# Patient Record
Sex: MALE | Race: WHITE | NOT HISPANIC OR LATINO | Employment: UNEMPLOYED | URBAN - METROPOLITAN AREA
[De-identification: names, ages, dates, MRNs, and addresses within clinical notes are randomized per-mention and may not be internally consistent; named-entity substitution may affect disease eponyms.]

---

## 2017-12-12 ENCOUNTER — TELEPHONE (OUTPATIENT)
Dept: INTERNAL MEDICINE | Facility: CLINIC | Age: 52
End: 2017-12-12

## 2017-12-12 NOTE — TELEPHONE ENCOUNTER
----- Message from Velma Manuelite sent at 12/12/2017  1:40 PM CST -----  Contact: Pt   Pt called and stated he needed to speak to the nurse. He stated he cannot make the appt for 2 and needs to reschedule for today or a date as son as possible. He can be reached at [phone] (home).    Thanks,  TF

## 2018-02-15 ENCOUNTER — PATIENT OUTREACH (OUTPATIENT)
Dept: ADMINISTRATIVE | Facility: HOSPITAL | Age: 53
End: 2018-02-15

## 2018-03-01 ENCOUNTER — OFFICE VISIT (OUTPATIENT)
Dept: INTERNAL MEDICINE | Facility: CLINIC | Age: 53
End: 2018-03-01
Payer: MEDICAID

## 2018-03-01 ENCOUNTER — LAB VISIT (OUTPATIENT)
Dept: LAB | Facility: HOSPITAL | Age: 53
End: 2018-03-01
Attending: FAMILY MEDICINE
Payer: MEDICAID

## 2018-03-01 VITALS
RESPIRATION RATE: 16 BRPM | OXYGEN SATURATION: 98 % | DIASTOLIC BLOOD PRESSURE: 74 MMHG | SYSTOLIC BLOOD PRESSURE: 118 MMHG | WEIGHT: 158.31 LBS | HEIGHT: 68 IN | HEART RATE: 111 BPM | TEMPERATURE: 97 F | BODY MASS INDEX: 23.99 KG/M2

## 2018-03-01 DIAGNOSIS — F11.20 NARCOTIC DEPENDENCE: ICD-10-CM

## 2018-03-01 DIAGNOSIS — M96.1 POST LAMINECTOMY SYNDROME: ICD-10-CM

## 2018-03-01 DIAGNOSIS — Z00.00 ROUTINE GENERAL MEDICAL EXAMINATION AT A HEALTH CARE FACILITY: ICD-10-CM

## 2018-03-01 DIAGNOSIS — Z00.00 ROUTINE GENERAL MEDICAL EXAMINATION AT A HEALTH CARE FACILITY: Primary | ICD-10-CM

## 2018-03-01 DIAGNOSIS — Z12.11 ENCOUNTER FOR SCREENING COLONOSCOPY: ICD-10-CM

## 2018-03-01 LAB
ALBUMIN SERPL BCP-MCNC: 4 G/DL
ALP SERPL-CCNC: 68 U/L
ALT SERPL W/O P-5'-P-CCNC: 20 U/L
ANION GAP SERPL CALC-SCNC: 8 MMOL/L
AST SERPL-CCNC: 20 U/L
BASOPHILS # BLD AUTO: 0.03 K/UL
BASOPHILS NFR BLD: 0.5 %
BILIRUB SERPL-MCNC: 0.7 MG/DL
BUN SERPL-MCNC: 11 MG/DL
CALCIUM SERPL-MCNC: 9.4 MG/DL
CHLORIDE SERPL-SCNC: 105 MMOL/L
CO2 SERPL-SCNC: 26 MMOL/L
CREAT SERPL-MCNC: 0.9 MG/DL
DIFFERENTIAL METHOD: ABNORMAL
EOSINOPHIL # BLD AUTO: 0.4 K/UL
EOSINOPHIL NFR BLD: 7.1 %
ERYTHROCYTE [DISTWIDTH] IN BLOOD BY AUTOMATED COUNT: 12.4 %
EST. GFR  (AFRICAN AMERICAN): >60 ML/MIN/1.73 M^2
EST. GFR  (NON AFRICAN AMERICAN): >60 ML/MIN/1.73 M^2
GLUCOSE SERPL-MCNC: 100 MG/DL
HCT VFR BLD AUTO: 38.9 %
HGB BLD-MCNC: 13.5 G/DL
LYMPHOCYTES # BLD AUTO: 1.2 K/UL
LYMPHOCYTES NFR BLD: 20.9 %
MCH RBC QN AUTO: 34.9 PG
MCHC RBC AUTO-ENTMCNC: 34.7 G/DL
MCV RBC AUTO: 101 FL
MONOCYTES # BLD AUTO: 0.8 K/UL
MONOCYTES NFR BLD: 13.8 %
NEUTROPHILS # BLD AUTO: 3.4 K/UL
NEUTROPHILS NFR BLD: 57.4 %
PLATELET # BLD AUTO: 284 K/UL
PMV BLD AUTO: 9.4 FL
POTASSIUM SERPL-SCNC: 4.2 MMOL/L
PROT SERPL-MCNC: 7.5 G/DL
RBC # BLD AUTO: 3.87 M/UL
SODIUM SERPL-SCNC: 139 MMOL/L
WBC # BLD AUTO: 5.88 K/UL

## 2018-03-01 PROCEDURE — 80061 LIPID PANEL: CPT

## 2018-03-01 PROCEDURE — 86803 HEPATITIS C AB TEST: CPT

## 2018-03-01 PROCEDURE — 83036 HEMOGLOBIN GLYCOSYLATED A1C: CPT

## 2018-03-01 PROCEDURE — 36415 COLL VENOUS BLD VENIPUNCTURE: CPT | Mod: PO

## 2018-03-01 PROCEDURE — 99215 OFFICE O/P EST HI 40 MIN: CPT | Mod: PBBFAC,PO | Performed by: FAMILY MEDICINE

## 2018-03-01 PROCEDURE — 99396 PREV VISIT EST AGE 40-64: CPT | Mod: S$PBB,,, | Performed by: FAMILY MEDICINE

## 2018-03-01 PROCEDURE — 85025 COMPLETE CBC W/AUTO DIFF WBC: CPT | Mod: PO

## 2018-03-01 PROCEDURE — 80053 COMPREHEN METABOLIC PANEL: CPT | Mod: PO

## 2018-03-01 PROCEDURE — 99999 PR PBB SHADOW E&M-EST. PATIENT-LVL V: CPT | Mod: PBBFAC,,, | Performed by: FAMILY MEDICINE

## 2018-03-01 RX ORDER — TRAMADOL HYDROCHLORIDE 50 MG/1
50 TABLET ORAL EVERY 6 HOURS PRN
Qty: 30 TABLET | Refills: 0 | Status: SHIPPED | OUTPATIENT
Start: 2018-03-01 | End: 2018-03-11

## 2018-03-01 RX ORDER — OXYCODONE AND ACETAMINOPHEN 10; 325 MG/1; MG/1
1 TABLET ORAL
COMMUNITY
Start: 2017-01-06 | End: 2018-03-01 | Stop reason: ALTCHOICE

## 2018-03-01 RX ORDER — IBUPROFEN 200 MG
200 TABLET ORAL
COMMUNITY

## 2018-03-01 RX ORDER — INDOMETHACIN 25 MG/1
CAPSULE ORAL
COMMUNITY
Start: 2017-01-12

## 2018-03-01 RX ORDER — METHOCARBAMOL 500 MG/1
500 TABLET, FILM COATED ORAL
COMMUNITY

## 2018-03-01 NOTE — ASSESSMENT & PLAN NOTE
Pt requesting narcotics. I declined.  Offered to send pt to pain management, which he is agreeable to.  Pt on disability, has imaging, and dx to substantiate chronic pain mangement.  Will have pt see Dr. edwards and discuss interventional tx with injections versus oral meds.  I think that pt has a need for treatment, but needs further evaluation.  Will be happy to see pt here in [place] for chronic pain if his situation is agreeable to Dr. Edwards for oral medication management.

## 2018-03-02 ENCOUNTER — TELEPHONE (OUTPATIENT)
Dept: INTERNAL MEDICINE | Facility: CLINIC | Age: 53
End: 2018-03-02

## 2018-03-02 LAB
CHOLEST SERPL-MCNC: 209 MG/DL
CHOLEST/HDLC SERPL: 4.4 {RATIO}
ESTIMATED AVG GLUCOSE: 88 MG/DL
HBA1C MFR BLD HPLC: 4.7 %
HCV AB SERPL QL IA: NEGATIVE
HDLC SERPL-MCNC: 47 MG/DL
HDLC SERPL: 22.5 %
LDLC SERPL CALC-MCNC: 104.4 MG/DL
NONHDLC SERPL-MCNC: 162 MG/DL
TRIGL SERPL-MCNC: 288 MG/DL

## 2018-03-02 NOTE — TELEPHONE ENCOUNTER
Spoke with pt and informed him per  He can make a follow up appt to discuss. Normal process is likely an xray then MRI, but we they can discuss at fu visit. Pt verbalized understanding and scheduled for Monday 3/5/18 to see .

## 2018-03-02 NOTE — TELEPHONE ENCOUNTER
----- Message from Thea Will sent at 3/2/2018  2:24 PM CST -----  Contact: Patient   Patient like to have a MRI done, Please call him at [phone].    Thanks  td

## 2018-03-02 NOTE — TELEPHONE ENCOUNTER
Pt wants to know if he can have an MRI done before his 3mo fu with  bc of back pain. I explained to him he was gone for the day so it may not be until Monday before I can get in touch with him. Pt verbalized understanding.

## 2018-03-21 ENCOUNTER — DOCUMENTATION ONLY (OUTPATIENT)
Dept: SURGERY | Facility: CLINIC | Age: 53
End: 2018-03-21

## 2018-03-21 NOTE — PROGRESS NOTES
Called the number provided in regards to scheduling procedure/colonoscopy, no ans left msg via voicemail to call office.

## 2018-05-09 ENCOUNTER — TELEPHONE (OUTPATIENT)
Dept: INTERNAL MEDICINE | Facility: CLINIC | Age: 53
End: 2018-05-09

## 2018-05-09 ENCOUNTER — TELEPHONE (OUTPATIENT)
Dept: SURGERY | Facility: CLINIC | Age: 53
End: 2018-05-09

## 2018-05-09 NOTE — TELEPHONE ENCOUNTER
----- Message from Ledy Vora MA sent at 5/8/2018 10:58 AM CDT -----  Regarding: Colonoscopy  Patient is calling to schedule colonoscopy

## 2018-05-09 NOTE — TELEPHONE ENCOUNTER
----- Message from Kiara Stroud sent at 5/9/2018  4:22 PM CDT -----  returned call...[phone] (home)

## 2018-05-09 NOTE — TELEPHONE ENCOUNTER
----- Message from Jody Mitchell sent at 5/9/2018  2:50 PM CDT -----  Contact: self [phone]  Would like to have order for Mri of back, please call back at [phone]. Md Sherie

## 2018-05-09 NOTE — TELEPHONE ENCOUNTER
Returned call in regards to scheduling colonoscopy/Dr. Pedroza no ans left msg via vm to call office.

## 2018-05-09 NOTE — TELEPHONE ENCOUNTER
Pt states that at his last appt on 3/1/2018 and MRI was discussed but I don't see where one was ever ordered. Pt is wanting to get this done to see what is causing his back pain

## 2018-05-10 NOTE — TELEPHONE ENCOUNTER
Left msg for pt that I was returning his call regarding the MRI to call us back at [phone].     Need to let pt know per    Pt wishes to have MRI, but I have no specific indication as of yet. I explained this to him on the last visit.     He states that he has the records. I do not have them though.     He was offered a visit to pain management, which has not been scheduled.     He was then scheduled with Dr. Cerda, then said it was taking too long, so he then left.     I have no specific info to warrant an MRI at this juncture.     If we receive records from his previous pain physician of his diagnosis of post laminectomy syndrome, I would be happy to help him.     I think we will have to discuss his problem in office and evaluate his issues though.     There is no MRI ordered currently.

## 2018-05-14 ENCOUNTER — TELEPHONE (OUTPATIENT)
Dept: INTERNAL MEDICINE | Facility: CLINIC | Age: 53
End: 2018-05-14

## 2018-05-14 NOTE — TELEPHONE ENCOUNTER
Spoke with pt and informed him per      Pt wishes to have MRI, but I have no specific indication as of yet. I explained this to him on the last visit.     He states that he has the records. I do not have them though.     He was offered a visit to pain management, which has not been scheduled.     He was then scheduled with Dr. Cerda, then said it was taking too long, so he then left.     I have no specific info to warrant an MRI at this juncture.     If we receive records from his previous pain physician of his diagnosis of post laminectomy syndrome, I would be happy to help him.     I think we will have to discuss his problem in office and evaluate his issues though.     There is no MRI ordered currently.     Pt's said that he is going to go back to see his regular doctor that he was seeing. He asked when we could get him in though and the next available was 9/20/18. Pt said he will go see his other doctor since our appts are months out.

## 2018-05-14 NOTE — TELEPHONE ENCOUNTER
----- Message from Román Samson sent at 5/14/2018  7:22 AM CDT -----  Contact: pt  He's calling in regards to orders to have an MRI scheduled, pls advise, [phone] (home)

## 2018-05-21 ENCOUNTER — PATIENT OUTREACH (OUTPATIENT)
Dept: ADMINISTRATIVE | Facility: HOSPITAL | Age: 53
End: 2018-05-21

## 2018-06-04 ENCOUNTER — TELEPHONE (OUTPATIENT)
Dept: INTERNAL MEDICINE | Facility: CLINIC | Age: 53
End: 2018-06-04

## 2018-06-04 PROBLEM — Z00.00 ROUTINE GENERAL MEDICAL EXAMINATION AT A HEALTH CARE FACILITY: Status: RESOLVED | Noted: 2018-03-01 | Resolved: 2018-06-04

## 2018-06-04 NOTE — TELEPHONE ENCOUNTER
Tried calling pt twice this morning and the number did not work. Tried calling pts sister with no answer.     Tried a 3rd time and was able to leave a msg for the pt to call us to reschedule his appt today with  at 1 bc he will be out of the office to call at [phone]

## 2018-06-12 ENCOUNTER — DOCUMENTATION ONLY (OUTPATIENT)
Dept: ENDOSCOPY | Facility: HOSPITAL | Age: 53
End: 2018-06-12

## 2018-06-12 NOTE — PROGRESS NOTES
Pt canceled procedure due to lack of transportation. He stated he will call back once transportation arrangements have been set up.

## 2023-08-06 NOTE — PROGRESS NOTES
Subjective:       Patient ID: Octavio Pang is a 52 y.o. male.    Chief Complaint: Back Pain and Neck Pain    Subjective:     Octavio Pang is a 52 y.o. male and is here for a comprehensive physical exam. The patient reports problems - back pain, neck pain.    Do you take any herbs or supplements that were not prescribed by a doctor? Yes, vitamins  Are you taking calcium supplements? yes  Are you taking aspirin daily? no     History:  Any STD's in the past? none    The following portions of the patient's history were reviewed and updated as appropriate: allergies, current medications, past family history, past medical history, past social history, past surgical history and problem list.    Review of Systems  Do you have pain that bothers you in your daily life? Yes - back pain, and neck pain  Pertinent items are noted in HPI.    Problem List Items Addressed This Visit        GI    Encounter for screening colonoscopy    Relevant Orders    Case request GI: COLONOSCOPY (Completed)       Orthopedic    Post laminectomy syndrome    Relevant Medications    traMADol (ULTRAM) 50 mg tablet       Other    Routine general medical examination at a health care facility - Primary    Relevant Orders    Lipid panel    Hepatitis C antibody    CBC auto differential    Comprehensive metabolic panel    Hemoglobin A1c      2. Patient Counseling:  --Nutrition: Stressed importance of moderation in sodium/caffeine intake, saturated fat and cholesterol, caloric balance, sufficient intake of fresh fruits, vegetables, fiber, calcium, iron, and 1 mg of folate supplement per day (for females capable of pregnancy).  --Discussed the issue of estrogen replacement, calcium supplement, and the daily use of baby aspirin.  --Exercise: Stressed the importance of regular exercise.   --Substance Abuse: Discussed cessation/primary prevention of tobacco, alcohol, or other drug use; driving or other dangerous activities under the influence; availability of  treatment for abuse.    --Sexuality: Discussed sexually transmitted diseases, partner selection, use of condoms, avoidance of unintended pregnancy  and contraceptive alternatives.   --Injury prevention: Discussed safety belts, safety helmets, smoke detector, smoking near bedding or upholstery.   --Dental health: Discussed importance of regular tooth brushing, flossing, and dental visits.  --Immunizations reviewed.  --Discussed benefits of screening colonoscopy.  --After hours service discussed with patient    3. Discussed the patient's BMI with him.  The BMI WNL.  4. Follow up as needed for acute illness          Neck Pain    Pertinent negatives include no chest pain or headaches.     Review of Systems   Constitutional: Negative for activity change.   HENT: Negative for ear pain.    Eyes: Negative for pain.   Respiratory: Negative for shortness of breath.    Cardiovascular: Negative for chest pain.   Gastrointestinal: Negative for abdominal pain.   Genitourinary: Negative for dysuria.   Musculoskeletal: Positive for back pain and neck pain.   Skin: Negative for rash.   Neurological: Negative for headaches.       Objective:      Physical Exam   Constitutional: He appears well-developed and well-nourished. No distress.   HENT:   Head: Normocephalic and atraumatic.   Cardiovascular: Normal rate and regular rhythm.    Pulmonary/Chest: Effort normal and breath sounds normal. No respiratory distress. He has no wheezes.   Abdominal: Soft. Bowel sounds are normal. There is no tenderness.   Musculoskeletal: He exhibits no edema.   Neurological: He is alert.   Skin: Skin is warm and dry. No rash noted. He is not diaphoretic. No erythema.   Nursing note and vitals reviewed.      Assessment:       1. Routine general medical examination at a health care facility    2. Encounter for screening colonoscopy    3. Post laminectomy syndrome    4. Narcotic dependence        Plan:     Problem List Items Addressed This Visit         Psychiatric    Narcotic dependence    Current Assessment & Plan     See post-laminectomy note.            GI    Encounter for screening colonoscopy    Relevant Orders    Case request GI: COLONOSCOPY (Completed)       Orthopedic    Post laminectomy syndrome    Current Assessment & Plan     Pt requesting narcotics. I declined.  Offered to send pt to pain management, which he is agreeable to.  Pt on disability, has imaging, and dx to substantiate chronic pain mangement.  Will have pt see Dr. edwards and discuss interventional tx with injections versus oral meds.  I think that pt has a need for treatment, but needs further evaluation.  Will be happy to see pt here in [place] for chronic pain if his situation is agreeable to Dr. Edwards for oral medication management.         Relevant Medications    traMADol (ULTRAM) 50 mg tablet       Other    Routine general medical examination at a health care facility - Primary    Relevant Orders    Lipid panel    Hepatitis C antibody    CBC auto differential    Comprehensive metabolic panel    Hemoglobin A1c         Supervision was available

## 2024-05-12 ENCOUNTER — HOSPITAL ENCOUNTER (EMERGENCY)
Facility: HOSPITAL | Age: 59
Discharge: HOME | End: 2024-05-12
Attending: EMERGENCY MEDICINE
Payer: MEDICARE

## 2024-05-12 VITALS
WEIGHT: 141.63 LBS | HEIGHT: 60 IN | SYSTOLIC BLOOD PRESSURE: 118 MMHG | OXYGEN SATURATION: 100 % | DIASTOLIC BLOOD PRESSURE: 71 MMHG | HEART RATE: 88 BPM | TEMPERATURE: 98 F | RESPIRATION RATE: 20 BRPM | BODY MASS INDEX: 27.8 KG/M2

## 2024-05-12 DIAGNOSIS — T78.40XA ALLERGIC REACTION, INITIAL ENCOUNTER: Primary | ICD-10-CM

## 2024-05-12 PROCEDURE — 99284 EMERGENCY DEPT VISIT MOD MDM: CPT | Mod: 25,ER

## 2024-05-12 PROCEDURE — 63600175 PHARM REV CODE 636 W HCPCS: Mod: ER | Performed by: EMERGENCY MEDICINE

## 2024-05-12 PROCEDURE — 94761 N-INVAS EAR/PLS OXIMETRY MLT: CPT | Mod: ER

## 2024-05-12 PROCEDURE — 96372 THER/PROPH/DIAG INJ SC/IM: CPT | Performed by: EMERGENCY MEDICINE

## 2024-05-12 RX ORDER — DIPHENHYDRAMINE HYDROCHLORIDE 50 MG/ML
25 INJECTION INTRAMUSCULAR; INTRAVENOUS
Status: COMPLETED | OUTPATIENT
Start: 2024-05-12 | End: 2024-05-12

## 2024-05-12 RX ORDER — DEXAMETHASONE SODIUM PHOSPHATE 4 MG/ML
8 INJECTION, SOLUTION INTRA-ARTICULAR; INTRALESIONAL; INTRAMUSCULAR; INTRAVENOUS; SOFT TISSUE
Status: COMPLETED | OUTPATIENT
Start: 2024-05-12 | End: 2024-05-12

## 2024-05-12 RX ORDER — METHYLPREDNISOLONE 4 MG/1
TABLET ORAL
Qty: 1 EACH | Refills: 0 | Status: SHIPPED | OUTPATIENT
Start: 2024-05-12

## 2024-05-12 RX ADMIN — DEXAMETHASONE SODIUM PHOSPHATE 8 MG: 4 INJECTION INTRA-ARTICULAR; INTRALESIONAL; INTRAMUSCULAR; INTRAVENOUS; SOFT TISSUE at 02:05

## 2024-05-12 RX ADMIN — DIPHENHYDRAMINE HYDROCHLORIDE 25 MG: 50 INJECTION INTRAMUSCULAR; INTRAVENOUS at 02:05

## 2024-05-12 NOTE — DISCHARGE INSTRUCTIONS
No definite cause known for this reaction.    Return at any point if worse.      Use Benadryl 25 mg every 6 hours as needed.    Take the steroid Dosepak as labeled.

## 2024-05-12 NOTE — ED PROVIDER NOTES
Encounter Date: 5/12/2024       History     Chief Complaint   Patient presents with    Facial Swelling     Lower facial swelling, onset today, deny known allergies      He has spending some time outdoors, believes he may have been stone on the lower lip on the right side but is not certain about this.  He woke this morning with a feeling possibly consistent with an insect bite or sting on the right lower lip, he developed swelling involving the lower face and mid face bilaterally, slightly improved with Benadryl prior to arrival, no other complaints.  It does not itch or hurt and he does not have any intraoral symptoms of any kind.  No prior such reaction.  He has no known medication or food allergies.  He does smoke cigarettes but denies other significant substance use.  He has been on various medications in the past but is prescriptions now and states he only occasionally takes a BC powder or a similar over-the-counter analgesic.  No recent antibiotics.  Thorough questioning does not reveal other likely etiology for this reaction.    The history is provided by the patient. No  was used.     Review of patient's allergies indicates:   Allergen Reactions    Ancef [cefazolin] Other (See Comments)     Headaches and dizziness     Past Medical History:   Diagnosis Date    ADD (attention deficit disorder)     Anxiety     Back pain     Depression     Gout     Neuromuscular disorder      Past Surgical History:   Procedure Laterality Date    BACK SURGERY  10/13/2016    NECK SURGERY       Family History   Problem Relation Name Age of Onset    Arthritis Father      Pulmonary fibrosis Mother       Social History     Tobacco Use    Smoking status: Some Days     Current packs/day: 1.00     Types: Cigarettes    Smokeless tobacco: Never   Substance Use Topics    Alcohol use: Yes     Comment: occassionally     Drug use: No     Review of Systems   Constitutional:  Negative for chills and fever.   HENT:  Negative  for congestion, facial swelling, nosebleeds and sinus pressure.         See HPI   Eyes:  Negative for pain and redness.   Respiratory:  Negative for chest tightness, shortness of breath and wheezing.    Cardiovascular:  Negative for chest pain, palpitations and leg swelling.   Gastrointestinal:  Negative for abdominal distention, abdominal pain, diarrhea, nausea and vomiting.   Endocrine: Negative for cold intolerance, polydipsia and polyphagia.   Genitourinary:  Negative for difficulty urinating, dysuria, frequency and hematuria.   Musculoskeletal:  Negative for arthralgias, back pain, myalgias and neck pain.   Skin:  Negative for color change and rash.        See HPI   Neurological:  Negative for dizziness, weakness, numbness and headaches.   Hematological:  Negative for adenopathy. Does not bruise/bleed easily.   Psychiatric/Behavioral:  Negative for agitation and behavioral problems.    All other systems reviewed and are negative.      Physical Exam     Initial Vitals [05/12/24 1412]   BP Pulse Resp Temp SpO2   120/69 102 20 98.1 °F (36.7 °C) 97 %      MAP       --         Physical Exam    Nursing note and vitals reviewed.  Constitutional: He appears well-developed and well-nourished. He is not diaphoretic. No distress.   HENT:   Head: Normocephalic and atraumatic.   Mouth/Throat: Oropharynx is clear and moist. No oropharyngeal exudate.   Soft, painless swelling involves the skin of the lower 2/3 of the face bilaterally including the periorbital region but not the nose, slightly worse on the right, not associated with erythema, warmth, tenderness, fluctuance, or any evidence intraoral infection.  He does have poor teeth with severe erosions throughout but no gingival findings or other evidence of intraoral infection.  There is no intraoral swelling or edema, airway is clear, no stridor.  No other findings.  Exam is most suggestive of an angioedema type reaction.   Eyes: Conjunctivae and EOM are normal. Pupils  are equal, round, and reactive to light. Right eye exhibits no discharge. Left eye exhibits no discharge. No scleral icterus.   Neck: Neck supple. No thyromegaly present. No tracheal deviation present. No JVD present.   Normal range of motion.  Cardiovascular:  Normal rate, regular rhythm and normal heart sounds.     Exam reveals no gallop and no friction rub.       No murmur heard.  Pulmonary/Chest: Breath sounds normal. No respiratory distress. He has no wheezes. He has no rhonchi. He has no rales. He exhibits no tenderness.   Abdominal: Abdomen is soft. Bowel sounds are normal. He exhibits no distension and no mass. There is no abdominal tenderness. There is no rebound and no guarding.   Musculoskeletal:         General: No tenderness or edema. Normal range of motion.      Cervical back: Normal range of motion and neck supple.     Lymphadenopathy:     He has no cervical adenopathy.   Neurological: He is alert and oriented to person, place, and time. He has normal strength. No cranial nerve deficit.   Skin: Skin is warm and dry. No rash noted. No erythema.   Psychiatric: He has a normal mood and affect. His behavior is normal. Judgment and thought content normal.         ED Course   Procedures  Labs Reviewed - No data to display         3:34 PM No change in symptoms or exam.  Vital signs stable.  Continuing to monitor.    4:57 PM Multiple re-evaluations.  Mild improvement noted.  No worsening in any way, particular attention paid to neck, submental area, and all intraoral tissues.  While the quality of the facial and cheek swelling most closely resembles angioedema, can not rule out a histamine-mediated reaction.  Counseled in general regarding allergic reactions, stable for outpatient management with the following discharge instructions:      No definite cause known for this reaction.    Return at any point if worse.      Use Benadryl 25 mg every 6 hours as needed.    Take the steroid Dosepak as  labeled.          Imaging Results    None          Medications   dexAMETHasone injection 8 mg (8 mg Intramuscular Given 5/12/24 1448)   diphenhydrAMINE injection 25 mg (25 mg Intramuscular Given 5/12/24 1447)     Medical Decision Making  Problems Addressed:  Allergic reaction, initial encounter: acute illness or injury    Risk  Prescription drug management.      Additional MDM:   Differential Diagnosis:   Medication allergy, insect reaction, food allergy, other allergic reaction                                    Clinical Impression:  Final diagnoses:  [T78.40XA] Allergic reaction, initial encounter (Primary)          ED Disposition Condition    Discharge Stable          ED Prescriptions       Medication Sig Dispense Start Date End Date Auth. Provider    methylPREDNISolone (MEDROL DOSEPACK) 4 mg tablet As per package label 1 each 5/12/2024 -- Jermain Lema MD          Follow-up Information       Follow up With Specialties Details Why Contact [facility] - Emergency Dept Emergency Medicine  As needed [id number]  [place] [id number] [phone]    Herman Quan MD Internal Medicine  As needed [id number]  [place] [address]  [phone]               Jermain Lema MD  05/12/24 3287

## 2025-02-18 ENCOUNTER — HOSPITAL ENCOUNTER (OUTPATIENT)
Dept: RADIOLOGY | Facility: HOSPITAL | Age: 60
Discharge: HOME | End: 2025-02-18
Attending: INTERNAL MEDICINE
Payer: MEDICARE

## 2025-02-18 ENCOUNTER — HOSPITAL ENCOUNTER (INPATIENT)
Facility: HOSPITAL | Age: 60
LOS: 7 days | Discharge: HOME HEALTH | DRG: 164 | End: 2025-02-25
Attending: EMERGENCY MEDICINE | Admitting: SPECIALIST
Payer: MEDICARE

## 2025-02-18 ENCOUNTER — HOSPITAL ENCOUNTER (OUTPATIENT)
Dept: RADIOLOGY | Facility: HOSPITAL | Age: 60
Discharge: HOME | End: 2025-02-18
Attending: FAMILY MEDICINE
Payer: MEDICARE

## 2025-02-18 DIAGNOSIS — J93.9 BILATERAL PNEUMOTHORACES: ICD-10-CM

## 2025-02-18 DIAGNOSIS — M51.9 LUMBAR DISC DISORDER: ICD-10-CM

## 2025-02-18 DIAGNOSIS — F17.210 TOBACCO SMOKER, 1 PACK OF CIGARETTES OR LESS PER DAY: ICD-10-CM

## 2025-02-18 DIAGNOSIS — J41.8 MIXED SIMPLE AND MUCOPURULENT CHRONIC BRONCHITIS: ICD-10-CM

## 2025-02-18 DIAGNOSIS — M96.1 POST LAMINECTOMY SYNDROME: ICD-10-CM

## 2025-02-18 DIAGNOSIS — R06.02 SOB (SHORTNESS OF BREATH): ICD-10-CM

## 2025-02-18 DIAGNOSIS — Z96.89 CHEST TUBE IN PLACE: ICD-10-CM

## 2025-02-18 DIAGNOSIS — R05.9 COUGH, UNSPECIFIED TYPE: ICD-10-CM

## 2025-02-18 DIAGNOSIS — J93.83 SPONTANEOUS PNEUMOTHORAX: Primary | ICD-10-CM

## 2025-02-18 DIAGNOSIS — R93.89 ABNORMAL CT OF THE CHEST: ICD-10-CM

## 2025-02-18 PROBLEM — Z12.11 ENCOUNTER FOR SCREENING COLONOSCOPY: Status: RESOLVED | Noted: 2018-03-01 | Resolved: 2025-02-18

## 2025-02-18 PROBLEM — F41.9 ANXIETY: Status: ACTIVE | Noted: 2025-02-18

## 2025-02-18 LAB
ALBUMIN SERPL BCP-MCNC: 2.9 G/DL (ref 3.5–5.2)
ALP SERPL-CCNC: 61 U/L (ref 40–150)
ALT SERPL W/O P-5'-P-CCNC: 15 U/L (ref 10–44)
ANION GAP SERPL CALC-SCNC: 9 MMOL/L (ref 8–16)
AST SERPL-CCNC: 22 U/L (ref 10–40)
BASOPHILS # BLD AUTO: 0.07 K/UL (ref 0–0.2)
BASOPHILS NFR BLD: 0.6 % (ref 0–1.9)
BILIRUB SERPL-MCNC: 0.2 MG/DL (ref 0.1–1)
BNP SERPL-MCNC: <10 PG/ML (ref 0–99)
BUN SERPL-MCNC: 18 MG/DL (ref 6–20)
CALCIUM SERPL-MCNC: 9.2 MG/DL (ref 8.7–10.5)
CHLORIDE SERPL-SCNC: 99 MMOL/L (ref 95–110)
CO2 SERPL-SCNC: 28 MMOL/L (ref 23–29)
CREAT SERPL-MCNC: 0.8 MG/DL (ref 0.5–1.4)
CTP QC/QA: YES
CTP QC/QA: YES
DIFFERENTIAL METHOD BLD: ABNORMAL
EOSINOPHIL # BLD AUTO: 1.1 K/UL (ref 0–0.5)
EOSINOPHIL NFR BLD: 9.9 % (ref 0–8)
ERYTHROCYTE [DISTWIDTH] IN BLOOD BY AUTOMATED COUNT: 13.2 % (ref 11.5–14.5)
EST. GFR  (NO RACE VARIABLE): >60 ML/MIN/1.73 M^2
GLUCOSE SERPL-MCNC: 96 MG/DL (ref 70–110)
HCT VFR BLD AUTO: 34.7 % (ref 40–54)
HGB BLD-MCNC: 12.1 G/DL (ref 14–18)
IMM GRANULOCYTES # BLD AUTO: 0.04 K/UL (ref 0–0.04)
IMM GRANULOCYTES NFR BLD AUTO: 0.4 % (ref 0–0.5)
LACTATE SERPL-SCNC: 1.8 MMOL/L (ref 0.5–2.2)
LYMPHOCYTES # BLD AUTO: 1.1 K/UL (ref 1–4.8)
LYMPHOCYTES NFR BLD: 9.9 % (ref 18–48)
MCH RBC QN AUTO: 31.1 PG (ref 27–31)
MCHC RBC AUTO-ENTMCNC: 34.9 G/DL (ref 32–36)
MCV RBC AUTO: 89 FL (ref 82–98)
MONOCYTES # BLD AUTO: 1 K/UL (ref 0.3–1)
MONOCYTES NFR BLD: 9 % (ref 4–15)
NEUTROPHILS # BLD AUTO: 7.6 K/UL (ref 1.8–7.7)
NEUTROPHILS NFR BLD: 70.2 % (ref 38–73)
NRBC BLD-RTO: 0 /100 WBC
PLATELET # BLD AUTO: 476 K/UL (ref 150–450)
PMV BLD AUTO: 9.1 FL (ref 9.2–12.9)
POC MOLECULAR INFLUENZA A AGN: NEGATIVE
POC MOLECULAR INFLUENZA B AGN: NEGATIVE
POTASSIUM SERPL-SCNC: 4.2 MMOL/L (ref 3.5–5.1)
PROT SERPL-MCNC: 8.5 G/DL (ref 6–8.4)
RBC # BLD AUTO: 3.89 M/UL (ref 4.6–6.2)
SARS-COV-2 RDRP RESP QL NAA+PROBE: NEGATIVE
SODIUM SERPL-SCNC: 136 MMOL/L (ref 136–145)
TROPONIN I SERPL DL<=0.01 NG/ML-MCNC: <0.006 NG/ML (ref 0–0.03)
WBC # BLD AUTO: 10.83 K/UL (ref 3.9–12.7)

## 2025-02-18 PROCEDURE — 25500020 PHARM REV CODE 255: Mod: ER | Performed by: SPECIALIST

## 2025-02-18 PROCEDURE — 83605 ASSAY OF LACTIC ACID: CPT | Mod: ER | Performed by: EMERGENCY MEDICINE

## 2025-02-18 PROCEDURE — 87040 BLOOD CULTURE FOR BACTERIA: CPT | Performed by: EMERGENCY MEDICINE

## 2025-02-18 PROCEDURE — 71046 X-RAY EXAM CHEST 2 VIEWS: CPT | Mod: TC,PO

## 2025-02-18 PROCEDURE — 80053 COMPREHEN METABOLIC PANEL: CPT | Mod: ER | Performed by: EMERGENCY MEDICINE

## 2025-02-18 PROCEDURE — 0W9930Z DRAINAGE OF RIGHT PLEURAL CAVITY WITH DRAINAGE DEVICE, PERCUTANEOUS APPROACH: ICD-10-PCS | Performed by: EMERGENCY MEDICINE

## 2025-02-18 PROCEDURE — 20000000 HC ICU ROOM

## 2025-02-18 PROCEDURE — 63600175 PHARM REV CODE 636 W HCPCS: Performed by: NURSE PRACTITIONER

## 2025-02-18 PROCEDURE — 27000221 HC OXYGEN, UP TO 24 HOURS: Mod: ER

## 2025-02-18 PROCEDURE — 83880 ASSAY OF NATRIURETIC PEPTIDE: CPT | Mod: ER | Performed by: EMERGENCY MEDICINE

## 2025-02-18 PROCEDURE — 25000003 PHARM REV CODE 250: Performed by: NURSE PRACTITIONER

## 2025-02-18 PROCEDURE — 87635 SARS-COV-2 COVID-19 AMP PRB: CPT | Mod: ER | Performed by: EMERGENCY MEDICINE

## 2025-02-18 PROCEDURE — 85025 COMPLETE CBC W/AUTO DIFF WBC: CPT | Mod: ER | Performed by: EMERGENCY MEDICINE

## 2025-02-18 PROCEDURE — 84484 ASSAY OF TROPONIN QUANT: CPT | Mod: ER | Performed by: EMERGENCY MEDICINE

## 2025-02-18 PROCEDURE — 63600175 PHARM REV CODE 636 W HCPCS: Mod: ER | Performed by: EMERGENCY MEDICINE

## 2025-02-18 PROCEDURE — 94761 N-INVAS EAR/PLS OXIMETRY MLT: CPT | Mod: ER

## 2025-02-18 RX ORDER — FAMOTIDINE 20 MG/1
20 TABLET, FILM COATED ORAL 2 TIMES DAILY
Status: DISCONTINUED | OUTPATIENT
Start: 2025-02-18 | End: 2025-02-21

## 2025-02-18 RX ORDER — ALPRAZOLAM 0.25 MG/1
0.25 TABLET ORAL NIGHTLY PRN
Status: DISCONTINUED | OUTPATIENT
Start: 2025-02-18 | End: 2025-02-19

## 2025-02-18 RX ORDER — MORPHINE SULFATE 4 MG/ML
4 INJECTION, SOLUTION INTRAMUSCULAR; INTRAVENOUS
Refills: 0 | Status: COMPLETED | OUTPATIENT
Start: 2025-02-18 | End: 2025-02-18

## 2025-02-18 RX ORDER — IPRATROPIUM BROMIDE AND ALBUTEROL SULFATE 2.5; .5 MG/3ML; MG/3ML
3 SOLUTION RESPIRATORY (INHALATION) EVERY 4 HOURS PRN
Status: DISCONTINUED | OUTPATIENT
Start: 2025-02-18 | End: 2025-02-25 | Stop reason: HOSPADM

## 2025-02-18 RX ORDER — LEVOFLOXACIN 5 MG/ML
750 INJECTION, SOLUTION INTRAVENOUS
Status: COMPLETED | OUTPATIENT
Start: 2025-02-18 | End: 2025-02-18

## 2025-02-18 RX ORDER — SODIUM CHLORIDE 0.9 % (FLUSH) 0.9 %
10 SYRINGE (ML) INJECTION
Status: DISCONTINUED | OUTPATIENT
Start: 2025-02-18 | End: 2025-02-24

## 2025-02-18 RX ORDER — ONDANSETRON HYDROCHLORIDE 2 MG/ML
8 INJECTION, SOLUTION INTRAVENOUS EVERY 8 HOURS PRN
Status: DISCONTINUED | OUTPATIENT
Start: 2025-02-18 | End: 2025-02-21

## 2025-02-18 RX ORDER — ACETAMINOPHEN 325 MG/1
650 TABLET ORAL EVERY 4 HOURS PRN
Status: DISCONTINUED | OUTPATIENT
Start: 2025-02-18 | End: 2025-02-25 | Stop reason: HOSPADM

## 2025-02-18 RX ORDER — HYDROCODONE BITARTRATE AND ACETAMINOPHEN 5; 325 MG/1; MG/1
1 TABLET ORAL EVERY 4 HOURS PRN
Refills: 0 | Status: DISCONTINUED | OUTPATIENT
Start: 2025-02-18 | End: 2025-02-20

## 2025-02-18 RX ORDER — LORAZEPAM 2 MG/ML
1 INJECTION INTRAMUSCULAR
Status: COMPLETED | OUTPATIENT
Start: 2025-02-18 | End: 2025-02-18

## 2025-02-18 RX ORDER — ENOXAPARIN SODIUM 100 MG/ML
40 INJECTION SUBCUTANEOUS EVERY 24 HOURS
Status: DISCONTINUED | OUTPATIENT
Start: 2025-02-18 | End: 2025-02-25 | Stop reason: HOSPADM

## 2025-02-18 RX ADMIN — ENOXAPARIN SODIUM 40 MG: 40 INJECTION SUBCUTANEOUS at 10:02

## 2025-02-18 RX ADMIN — HYDROCODONE BITARTRATE AND ACETAMINOPHEN 1 TABLET: 5; 325 TABLET ORAL at 10:02

## 2025-02-18 RX ADMIN — LORAZEPAM 1 MG: 2 INJECTION INTRAMUSCULAR; INTRAVENOUS at 04:02

## 2025-02-18 RX ADMIN — FAMOTIDINE 20 MG: 20 TABLET ORAL at 10:02

## 2025-02-18 RX ADMIN — ALPRAZOLAM 0.25 MG: 0.25 TABLET ORAL at 10:02

## 2025-02-18 RX ADMIN — LEVOFLOXACIN 750 MG: 750 INJECTION, SOLUTION INTRAVENOUS at 06:02

## 2025-02-18 RX ADMIN — IOHEXOL 100 ML: 350 INJECTION, SOLUTION INTRAVENOUS at 07:02

## 2025-02-18 RX ADMIN — MORPHINE SULFATE 4 MG: 4 INJECTION INTRAVENOUS at 04:02

## 2025-02-18 NOTE — ED PROVIDER NOTES
Encounter Date: 2/18/2025       History     Chief Complaint   Patient presents with    pneumothorax     X ray at pcp today. SOB x 2 months.      58 y/o M with long tobacco history here with c/o SOB, cough, sputum production over the past 4 weeks. XR today showed b/l PTX. Right is moderate to large, and left is small. Sent to ED for management. Has been using inhalers without improvement in symptoms.     The history is provided by the patient.     Review of patient's allergies indicates:   Allergen Reactions    Ancef [cefazolin] Other (See Comments)     Headaches and dizziness     Past Medical History:   Diagnosis Date    ADD (attention deficit disorder)     Anxiety     Back pain     Depression     Gout     Neuromuscular disorder      Past Surgical History:   Procedure Laterality Date    BACK SURGERY  10/13/2016    NECK SURGERY       Family History   Problem Relation Name Age of Onset    Arthritis Father      Pulmonary fibrosis Mother       Social History[1]  Review of Systems   Constitutional:  Negative for chills and fever.   HENT:  Positive for congestion. Negative for dental problem.    Eyes:  Negative for pain and visual disturbance.   Respiratory:  Positive for cough and shortness of breath.    Cardiovascular:  Negative for chest pain and palpitations.   Gastrointestinal:  Negative for abdominal pain, diarrhea, nausea and vomiting.   Genitourinary:  Negative for dysuria and flank pain.   Musculoskeletal:  Negative for back pain and neck pain.   Skin:  Negative for rash and wound.   Neurological:  Negative for weakness, numbness and headaches.       Physical Exam     Initial Vitals   BP Pulse Resp Temp SpO2   02/18/25 1610 02/18/25 1610 02/18/25 1610 02/18/25 1613 02/18/25 1610   136/78 (!) 111 20 98 °F (36.7 °C) 97 %      MAP       --                Physical Exam    Constitutional: He appears well-developed and well-nourished. No distress.   HENT:   Head: Normocephalic and atraumatic. Mouth/Throat: Oropharynx  is clear and moist.   Eyes: EOM are normal. Pupils are equal, round, and reactive to light.   Neck: Neck supple. No tracheal deviation present.   Normal range of motion.  Cardiovascular:  Normal rate and regular rhythm.           Pulmonary/Chest: No respiratory distress.   Diminished breath sounds on the right.    Abdominal: He exhibits no distension. There is no abdominal tenderness.   Musculoskeletal:         General: No tenderness. Normal range of motion.      Cervical back: Normal range of motion and neck supple.     Neurological: He is alert and oriented to person, place, and time. He has normal strength. No sensory deficit.   Skin: Skin is warm and dry.   Psychiatric: He has a normal mood and affect.         ED Course   Critical Care    Date/Time: 2/18/2025 4:51 PM    Performed by: Dash Alves MD  Authorized by: Dash Alves MD  Direct patient critical care time: 6 minutes  Additional history critical care time: 7 minutes  Ordering / reviewing critical care time: 8 minutes  Documentation critical care time: 5 minutes  Consulting other physicians critical care time: 5 minutes  Other critical care time: 4 minutes  Total critical care time (exclusive of procedural time) : 35 minutes  Critical care time was exclusive of teaching time and separately billable procedures and treating other patients.  Critical care was necessary to treat or prevent imminent or life-threatening deterioration of the following conditions: Pneumothorax.  Critical care was time spent personally by me on the following activities: development of treatment plan with patient or surrogate, blood draw for specimens, discussions with consultants, interpretation of cardiac output measurements, evaluation of patient's response to treatment, examination of patient, obtaining history from patient or surrogate, ordering and performing treatments and interventions, ordering and review of laboratory studies, ordering and review of radiographic  "studies, pulse oximetry, re-evaluation of patient's condition and review of old charts.      Chest Tube    Date/Time: 2/18/2025 4:51 PM  Location procedure was performed: [facility] EMERGENCY DEPARTMENT    Performed by: Dash Alves MD  Authorized by: Dash Alves MD  Consent Done: Yes  Consent: Verbal consent obtained. Written consent obtained  Risks and benefits: risks, benefits and alternatives were discussed  Consent given by: patient  Patient understanding: patient states understanding of the procedure being performed  Patient consent: the patient's understanding of the procedure matches consent given  Procedure consent: procedure consent matches procedure scheduled  Relevant documents: relevant documents present and verified  Test results: test results available and properly labeled  Site marked: the operative site was marked  Imaging studies: imaging studies available  Required items: required blood products, implants, devices, and special equipment available  Patient identity confirmed: verbally with patient  Time out: Immediately prior to procedure a "time out" was called to verify the correct patient, procedure, equipment, support staff and site/side marked as required.  Indications: pneumothorax    Patient sedated: no  Anesthesia: local infiltration    Anesthesia:  Local Anesthetic: lidocaine 1% without epinephrine  Anesthetic total: 5 mL  Placement location: right lateral  Scalpel size: 11  Tube size: 8 Niuean  Dissection instrument: Seldinger technique.  Tension pneumothorax heard: no  Tube connected to: suction  Drainage characteristics: Air.  Suture material: 0 silk  Dressing: petrolatum-impregnated gauze and 4x4 sterile gauze  Post-insertion x-ray findings: tube in good position  Patient tolerance: Patient tolerated the procedure well with no immediate complications  Complications: No  Specimens: No  Implants: No        Labs Reviewed   CBC W/ AUTO DIFFERENTIAL - Abnormal       Result Value    WBC " 10.83      RBC 3.89 (*)     Hemoglobin 12.1 (*)     Hematocrit 34.7 (*)     MCV 89      MCH 31.1 (*)     MCHC 34.9      RDW 13.2      Platelets 476 (*)     MPV 9.1 (*)     Immature Granulocytes 0.4      Gran # (ANC) 7.6      Immature Grans (Abs) 0.04      Lymph # 1.1      Mono # 1.0      Eos # 1.1 (*)     Baso # 0.07      nRBC 0      Gran % 70.2      Lymph % 9.9 (*)     Mono % 9.0      Eosinophil % 9.9 (*)     Basophil % 0.6      Differential Method Automated     COMPREHENSIVE METABOLIC PANEL - Abnormal    Sodium 136      Potassium 4.2      Chloride 99      CO2 28      Glucose 96      BUN 18      Creatinine 0.8      Calcium 9.2      Total Protein 8.5 (*)     Albumin 2.9 (*)     Total Bilirubin 0.2      Alkaline Phosphatase 61      AST 22      ALT 15      eGFR >60.0      Anion Gap 9     LACTIC ACID, PLASMA    Lactate (Lactic Acid) 1.8     TROPONIN I    Troponin I <0.006     B-TYPE NATRIURETIC PEPTIDE   B-TYPE NATRIURETIC PEPTIDE    BNP <10     COMPREHENSIVE METABOLIC PANEL   SARS-COV-2 RDRP GENE    POC Rapid COVID Negative       Acceptable Yes     POCT INFLUENZA A/B MOLECULAR    POC Molecular Influenza A Ag Negative      POC Molecular Influenza B Ag Negative       Acceptable Yes       EKG Readings: (Independently Interpreted)   Rate of 104 beats per minute.  Sinus tachycardia.  Normal axis.  P.r., QRS and QTC intervals within normal limits.  No STEMI.       Imaging Results              CT Chest With Contrast (Final result)  Result time 02/18/25 19:22:32      Final result by Blanco Page MD (02/18/25 19:22:32)                   Impression:      As above    All CT scans   are performed using dose optimization techniques including the following: automated exposure control; adjustment of the mA and/or kV; use of iterative reconstruction technique.  Dose modulation was employed for ALARA by means of: Automated exposure control; adjustment of the mA and/or kV according to patient size (this  includes techniques or standardized protocols for targeted exams where dose is matched to indication/reason for exam; i.e. extremities or head); and/or use of iterative reconstructive technique.      Electronically signed by: Blanco Page  Date:    02/18/2025  Time:    19:22               Narrative:    EXAMINATION:  CT CHEST WITH CONTRAST    CLINICAL HISTORY:  Soft tissue mass, chest, US/xray nondiagnostic;    TECHNIQUE:  Axial images through the chest were obtained after the IV administration of 75 mL Omnipaque 350. Coronal and sagittal images obtained.    COMPARISON:  No prior chest CT available    FINDINGS:  Tiny left apical pneumothorax.  Mild moderate right-sided pneumothorax.  Mild moderate subpleural reticular and subpleural opacity in the right greater than left lung.  Anterior right pleural drainage catheter is identified.  Heart is not enlarged.  No aneurysm.  No pulmonary embolism.  Mild right sided pleural effusion.  Slight left-sided pleural effusion.  Coronary artery calcification.  Mediastinal lymph nodes behind the left atrium suspected.  Correlate clinically for pneumonia.                                       X-Ray Chest AP Portable (Final result)  Result time 02/18/25 18:21:22      Final result by Blanco Page MD (02/18/25 18:21:22)                   Impression:      As above      Electronically signed by: Blanco Page  Date:    02/18/2025  Time:    18:21               Narrative:    EXAMINATION:  XR CHEST AP PORTABLE    CLINICAL HISTORY:  Presence of other specified functional implants    TECHNIQUE:  Single frontal view of the chest was performed.    COMPARISON:  None    FINDINGS:  Redemonstration of right-sided pneumothorax, questionably slightly smaller.  Coarse interstitial and alveolar markings in the bilateral lungs.  Trace left apical pneumothorax.  Otherwise similar findings to prior exam.  Cervical hardware.                                       X-Ray Chest AP Portable (Final result)   Result time 02/18/25 16:40:00      Final result by Blanco Page MD (02/18/25 16:40:00)                   Impression:      Stable exam      Electronically signed by: Blanco Page  Date:    02/18/2025  Time:    16:40               Narrative:    EXAMINATION:  XR CHEST AP PORTABLE    CLINICAL HISTORY:  Pneumothorax;    TECHNIQUE:  Single frontal view of the chest was performed.    COMPARISON:  Prior dated 02/18/2025 at 09:50    FINDINGS:  Right-sided pneumothorax unchanged since the prior exam.  Coarse interstitial alveolar remain.  Cervical hardware.    Bones are intact.                                       Medications   sodium chloride 0.9% flush 10 mL (has no administration in time range)   enoxaparin injection 40 mg (40 mg Subcutaneous Given 2/22/25 1620)   acetaminophen tablet 650 mg (has no administration in time range)   albuterol-ipratropium 2.5 mg-0.5 mg/3 mL nebulizer solution 3 mL (has no administration in time range)   influenza (Flulaval, Fluzone, Fluarix) 45 mcg/0.5 mL IM vaccine (> or = 6 mo) 0.5 mL (has no administration in time range)   pneumoc 20-tres conj-dip cr(PF) (PREVNAR-20 (PF)) injection Syrg 0.5 mL (has no administration in time range)   ALPRAZolam tablet 0.25 mg (0.25 mg Oral Given 2/21/25 2122)   dextromethorphan-guaiFENesin  mg per 12 hr tablet 1 tablet (1 tablet Oral Given 2/22/25 2025)   senna-docusate 8.6-50 mg per tablet 1 tablet (1 tablet Oral Given 2/20/25 1708)   sodium chloride 0.9% flush 10 mL (has no administration in time range)   sodium chloride 0.9% flush 10 mL (has no administration in time range)   morphine injection 2 mg (2 mg Intravenous Not Given 2/20/25 2100)   mupirocin 2 % ointment 1 g (1 g Nasal Given 2/22/25 2025)   famotidine tablet 20 mg (20 mg Oral Given 2/22/25 2025)   oxyCODONE immediate release tablet 5 mg (5 mg Oral Given 2/22/25 1008)   morphine injection 4 mg (4 mg Intravenous Given 2/22/25 2024)   ondansetron disintegrating tablet 8 mg (8 mg Oral  Given 2/21/25 1928)   metoclopramide injection 5 mg (has no administration in time range)   polyethylene glycol packet 17 g (17 g Oral Given 2/22/25 1008)   bisacodyL suppository 10 mg (has no administration in time range)   ipratropium 0.02 % nebulizer solution 0.5 mg (0.5 mg Nebulization Given 2/22/25 2023)   vancomycin (VANCOCIN) 1,000 mg in 0.9% NaCl 250 mL IVPB (admixture device) (0 mg Intravenous Stopped 2/22/25 1750)   morphine injection 4 mg (4 mg Intravenous Given 2/18/25 1654)   LORazepam injection 1 mg (1 mg Intravenous Given 2/18/25 1659)   levoFLOXacin 750 mg/150 mL IVPB 750 mg (0 mg Intravenous Stopped 2/18/25 1949)   iohexoL (OMNIPAQUE 350) injection 100 mL (100 mLs Intravenous Given 2/18/25 1913)   magnesium sulfate 2g in water 50mL IVPB (premix) (0 g Intravenous Stopped 2/19/25 1112)   doxycycline 500mg in sodium chloride 50 mL pleurosclerosis (500 mg Intrapleural Given 2/21/25 1251)     Medical Decision Making  Amount and/or Complexity of Data Reviewed  Labs: ordered. Decision-making details documented in ED Course.  Radiology: ordered and independent interpretation performed. Decision-making details documented in ED Course.     Details: Agree with rads  ECG/medicine tests: ordered and independent interpretation performed. Decision-making details documented in ED Course.    Risk  Prescription drug management.  Decision regarding hospitalization.               ED Course as of 02/23/25 0014   Tue Feb 18, 2025 1703 All historical, clinical, radiographic, and laboratory findings were reviewed with the patient/family in detail along with the indications for transport to the facility in [place] in order to receive ICU admission and CT surgery evalauton.  All remaining questions and concerns were addressed at this time and the patient/family communicates understanding and agrees to proceed accordingly.  Similarly, all pertinent details of the encounter were discussed with Dr. Cortez who agrees to  receive the patient at [facility] for further care as outlined above.  The patient will be transferred by [facility] ambulance services secondary to a need for ongoing cardiac monitoring en route.  Dash Alves MD  5:03 PM    \ [BA]      ED Course User Index  [BA] Dash Alves MD                           Clinical Impression:  Final diagnoses:  [R06.02] SOB (shortness of breath)  [J93.9] Bilateral pneumothoraces  [R05.9] Cough, unspecified type  [Z96.89] Chest tube in place          ED Disposition Condition    Admit Stable                    [1]   Social History  Tobacco Use    Smoking status: Some Days     Current packs/day: 1.00     Types: Cigarettes    Smokeless tobacco: Never   Substance Use Topics    Alcohol use: Yes     Comment: occassionally     Drug use: No        Dash Alves MD  02/23/25 0015

## 2025-02-19 LAB
ANION GAP SERPL CALC-SCNC: 12 MMOL/L (ref 8–16)
BASOPHILS # BLD AUTO: 0.07 K/UL (ref 0–0.2)
BASOPHILS NFR BLD: 0.8 % (ref 0–1.9)
BUN SERPL-MCNC: 12 MG/DL (ref 6–20)
CALCIUM SERPL-MCNC: 9.3 MG/DL (ref 8.7–10.5)
CHLORIDE SERPL-SCNC: 102 MMOL/L (ref 95–110)
CO2 SERPL-SCNC: 21 MMOL/L (ref 23–29)
CREAT SERPL-MCNC: 0.8 MG/DL (ref 0.5–1.4)
DIFFERENTIAL METHOD BLD: ABNORMAL
EOSINOPHIL # BLD AUTO: 1 K/UL (ref 0–0.5)
EOSINOPHIL NFR BLD: 11.2 % (ref 0–8)
ERYTHROCYTE [DISTWIDTH] IN BLOOD BY AUTOMATED COUNT: 12.9 % (ref 11.5–14.5)
EST. GFR  (NO RACE VARIABLE): >60 ML/MIN/1.73 M^2
GLUCOSE SERPL-MCNC: 82 MG/DL (ref 70–110)
HCT VFR BLD AUTO: 41.5 % (ref 40–54)
HGB BLD-MCNC: 13.6 G/DL (ref 14–18)
IMM GRANULOCYTES # BLD AUTO: 0.04 K/UL (ref 0–0.04)
IMM GRANULOCYTES NFR BLD AUTO: 0.5 % (ref 0–0.5)
LYMPHOCYTES # BLD AUTO: 1 K/UL (ref 1–4.8)
LYMPHOCYTES NFR BLD: 11.7 % (ref 18–48)
MAGNESIUM SERPL-MCNC: 1.7 MG/DL (ref 1.6–2.6)
MCH RBC QN AUTO: 30.5 PG (ref 27–31)
MCHC RBC AUTO-ENTMCNC: 32.8 G/DL (ref 32–36)
MCV RBC AUTO: 93 FL (ref 82–98)
MONOCYTES # BLD AUTO: 1.3 K/UL (ref 0.3–1)
MONOCYTES NFR BLD: 14.7 % (ref 4–15)
NEUTROPHILS # BLD AUTO: 5.2 K/UL (ref 1.8–7.7)
NEUTROPHILS NFR BLD: 61.1 % (ref 38–73)
NRBC BLD-RTO: 0 /100 WBC
PLATELET # BLD AUTO: 412 K/UL (ref 150–450)
PMV BLD AUTO: 8.3 FL (ref 9.2–12.9)
POTASSIUM SERPL-SCNC: 4.5 MMOL/L (ref 3.5–5.1)
RBC # BLD AUTO: 4.46 M/UL (ref 4.6–6.2)
SODIUM SERPL-SCNC: 135 MMOL/L (ref 136–145)
WBC # BLD AUTO: 8.57 K/UL (ref 3.9–12.7)

## 2025-02-19 PROCEDURE — 25000003 PHARM REV CODE 250: Performed by: NURSE PRACTITIONER

## 2025-02-19 PROCEDURE — 36415 COLL VENOUS BLD VENIPUNCTURE: CPT | Performed by: NURSE PRACTITIONER

## 2025-02-19 PROCEDURE — 94761 N-INVAS EAR/PLS OXIMETRY MLT: CPT

## 2025-02-19 PROCEDURE — 83735 ASSAY OF MAGNESIUM: CPT | Performed by: NURSE PRACTITIONER

## 2025-02-19 PROCEDURE — 63600175 PHARM REV CODE 636 W HCPCS: Performed by: NURSE PRACTITIONER

## 2025-02-19 PROCEDURE — 21400001 HC TELEMETRY ROOM

## 2025-02-19 PROCEDURE — 25000003 PHARM REV CODE 250: Performed by: SPECIALIST

## 2025-02-19 PROCEDURE — 27000221 HC OXYGEN, UP TO 24 HOURS

## 2025-02-19 PROCEDURE — 85025 COMPLETE CBC W/AUTO DIFF WBC: CPT | Performed by: NURSE PRACTITIONER

## 2025-02-19 PROCEDURE — 80048 BASIC METABOLIC PNL TOTAL CA: CPT | Performed by: NURSE PRACTITIONER

## 2025-02-19 PROCEDURE — 25000003 PHARM REV CODE 250: Performed by: STUDENT IN AN ORGANIZED HEALTH CARE EDUCATION/TRAINING PROGRAM

## 2025-02-19 RX ORDER — ALPRAZOLAM 0.25 MG/1
0.25 TABLET ORAL 2 TIMES DAILY PRN
Status: DISCONTINUED | OUTPATIENT
Start: 2025-02-19 | End: 2025-02-25 | Stop reason: HOSPADM

## 2025-02-19 RX ORDER — MAGNESIUM SULFATE HEPTAHYDRATE 40 MG/ML
2 INJECTION, SOLUTION INTRAVENOUS ONCE
Status: COMPLETED | OUTPATIENT
Start: 2025-02-19 | End: 2025-02-19

## 2025-02-19 RX ORDER — MUPIROCIN 20 MG/G
OINTMENT TOPICAL 2 TIMES DAILY
Status: DISCONTINUED | OUTPATIENT
Start: 2025-02-19 | End: 2025-02-21

## 2025-02-19 RX ADMIN — MAGNESIUM SULFATE HEPTAHYDRATE 2 G: 40 INJECTION, SOLUTION INTRAVENOUS at 09:02

## 2025-02-19 RX ADMIN — FAMOTIDINE 20 MG: 20 TABLET ORAL at 09:02

## 2025-02-19 RX ADMIN — ENOXAPARIN SODIUM 40 MG: 40 INJECTION SUBCUTANEOUS at 04:02

## 2025-02-19 RX ADMIN — HYDROCODONE BITARTRATE AND ACETAMINOPHEN 1 TABLET: 5; 325 TABLET ORAL at 04:02

## 2025-02-19 RX ADMIN — FAMOTIDINE 20 MG: 20 TABLET ORAL at 08:02

## 2025-02-19 RX ADMIN — MUPIROCIN: 20 OINTMENT TOPICAL at 08:02

## 2025-02-19 RX ADMIN — ALPRAZOLAM 0.25 MG: 0.25 TABLET ORAL at 09:02

## 2025-02-19 RX ADMIN — GUAIFENESIN AND DEXTROMETHORPHAN HYDROBROMIDE 1 TABLET: 600; 30 TABLET, EXTENDED RELEASE ORAL at 08:02

## 2025-02-19 RX ADMIN — ALPRAZOLAM 0.25 MG: 0.25 TABLET ORAL at 08:02

## 2025-02-19 RX ADMIN — MUPIROCIN: 20 OINTMENT TOPICAL at 09:02

## 2025-02-19 NOTE — ASSESSMENT & PLAN NOTE
- cessation education during admission, cessation provided this AM  - refusing nicotine patch currently, will add if pt would like as some point

## 2025-02-19 NOTE — ASSESSMENT & PLAN NOTE
Bilateral, right > left  - maintain right CT to atrium suction  - daily CXR monitoring  - consult CTS to evaluate for possible VATS, biopsy- pending  - On Room air currently, stable to SD to hosp med

## 2025-02-19 NOTE — CONSULTS
[facility] - Int[facility] ([facility])  Hospital Medicine  Consult Note    Patient Name: Octavio Pang  MRN: [id number]  Admission Date: 2/18/2025  Hospital Length of Stay: 1 days  Attending Physician: Cayetano Cortez MD   Primary Care Provider: Herman Quan MD         Patient information was obtained from patient and ER records.     Inpatient consult to Hospitalist  Consult performed by: Tawanda Gonzalez NP  Consult ordered by: Eva Christianson NP        Subjective:     Principal Problem: Spontaneous pneumothorax    Chief Complaint:   Chief Complaint   Patient presents with    pneumothorax     X ray at pcp today. SOB x 2 months.         HPI: 59-year-old male with a known past medical history which includes ADHD, anxiety, depression, back pain, and tobacco abuse (reports less than 1 ppd) since age of 18 that presented to his PCP for evaluation of worsening shortness of breath over the past couple of months.  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  Patient underwent chest x-ray and he reports a few hours later he was called and told to go to the ER as he had bilateral pneumothoraces and concern for pneumonia on his chest x-ray.  At the time of arrival in the ER, patient underwent another x-ray which did reveal right greater than left bilateral pneumothoraces and a small bore chest tube was placed on the right.  Follow up x-rays with tiny left apical pneumothorax was still a moderate size right pneumothorax.  Patient was admitted to the ICU for close observation.  Patient remains on room air with adequate O2 sats.  He has no other complaints.  He is deemed stable for transfer out of the ICU to the floor in the morning of 2/19 for which Hospital Medicine was consulted to assume care.  CTS has been consulted for further evaluation and  management of right pneumothorax.     At the time of my exam in the ICU, the pt is awake and alert on RA in NAD. He quite thin. He is able to provide history and answer questions. Right sided pigtail chest tube in place connected to suction, but float not activated -- have discussed with nursing to ensure he is at -20 cm suction. Pt awaiting transfer to the floor.    Past Medical History:   Diagnosis Date    ADD (attention deficit disorder)     Anxiety     Back pain     Depression     Gout     Neuromuscular disorder        Past Surgical History:   Procedure Laterality Date    BACK SURGERY  10/13/2016    NECK SURGERY         Review of patient's allergies indicates:   Allergen Reactions    Ancef [cefazolin] Other (See Comments)     Headaches and dizziness       No current facility-administered medications on file prior to encounter.     Current Outpatient Medications on File Prior to Encounter   Medication Sig    allopurinol (ZYLOPRIM) 100 MG tablet Take 100 mg by mouth 3 (three) times daily.    alprazolam (XANAX) 1 MG tablet Take 2 mg by mouth nightly as needed for Anxiety.     clonazePAM (KLONOPIN) 1 MG tablet Take 1 mg by mouth 2 (two) times daily as needed for Anxiety.    gabapentin (NEURONTIN) 100 MG capsule Take 100 mg by mouth as needed.    ibuprofen (ADVIL,MOTRIN) 200 MG tablet Take 200 mg by mouth.    indomethacin (INDOCIN) 25 MG capsule 2 caps 3 times a day with food until gout is better then 1 twice a day for 5 more days    lisdexamfetamine (VYVANSE) 30 MG capsule Take 30 mg by mouth every morning.    methocarbamol (ROBAXIN) 500 MG Tab Take 500 mg by mouth.    methylPREDNISolone (MEDROL DOSEPACK) 4 mg tablet As per package label    naproxen (NAPROSYN) 500 MG tablet Take 1 tablet (500 mg total) by mouth 2 (two) times daily with meals.     Family History       Problem Relation (Age of Onset)    Arthritis Father    Pulmonary fibrosis Mother          Tobacco Use    Smoking status: Some Days     Current  packs/day: 1.00     Types: Cigarettes    Smokeless tobacco: Never   Substance and Sexual Activity    Alcohol use: Yes     Comment: occassionally     Drug use: No    Sexual activity: Not on file     Review of Systems   Constitutional:  Negative for fever.   HENT:  Negative for congestion.    Respiratory:  Positive for cough (intermittent). Negative for shortness of breath.    Cardiovascular:  Negative for leg swelling.   Gastrointestinal:  Negative for abdominal distention.   All other systems reviewed and are negative.    Objective:     Vital Signs (Most Recent):  Temp: 97.2 °F (36.2 °C) (02/19/25 0301)  Pulse: 77 (02/19/25 0600)  Resp: 14 (02/19/25 0600)  BP: (!) 150/84 (02/19/25 0600)  SpO2: 100 % (02/19/25 0600) Vital Signs (24h Range):  Temp:  [97.2 °F (36.2 °C)-98.2 °F (36.8 °C)] 97.2 °F (36.2 °C)  Pulse:  [] 77  Resp:  [14-26] 14  SpO2:  [95 %-100 %] 100 %  BP: (100-150)/(62-84) 150/84     Weight: 58 kg (127 lb 13.9 oz)  Body mass index is 19.44 kg/m².     Physical Exam  Vitals reviewed.   Constitutional:       General: He is awake.      Appearance: He is underweight.   HENT:      Mouth/Throat:      Dentition: Abnormal dentition. Dental caries present.   Cardiovascular:      Rate and Rhythm: Normal rate.      Pulses:           Radial pulses are 2+ on the right side and 2+ on the left side.   Pulmonary:      Effort: Pulmonary effort is normal.      Breath sounds: Decreased breath sounds present.      Comments: R pigtail CT in place to suction, on RA  Abdominal:      General: Abdomen is flat. There is no distension.      Palpations: Abdomen is soft.   Musculoskeletal:      Comments: EPSTEIN   Skin:     General: Skin is warm and dry.   Neurological:      General: No focal deficit present.      Mental Status: He is alert.   Psychiatric:         Attention and Perception: Attention normal.         Behavior: Behavior is cooperative.      Comments: calm          Significant Labs: All pertinent labs within the past  24 hours have been reviewed.    Significant Imaging: I have reviewed all pertinent imaging results/findings within the past 24 hours.  Assessment/Plan:     * Spontaneous pneumothorax  -spontaneous bilateral pneumothoraces present on admit, right-greater-than-left  -continue right pigtail chest tube to -20 cm of suction   -CTS consulted further evaluation and recommendations  -patient on room air, goal O2 sat greater than 92%  -daily chest x-rays  -? underlying ILD v other given environmental exposures  -would benefit from establishing with pulm as an outpt as he has underlying lung disease and reports has no evaluation of it, referral sent     Abnormal CT of the chest  - see plan for ptx      Anxiety  - PRN xanax  - supportive care      Tobacco smoker, 1 pack of cigarettes or less per day  - cessation education during admission, cessation provided this AM  - refusing nicotine patch currently, will add if pt would like as some point         VTE Risk Mitigation (From admission, onward)           Ordered     enoxaparin injection 40 mg  Daily         02/18/25 0517                  Thank you for your consult. I will follow-up with patient. Please contact us if you have any additional questions.    Tawanda Gonzalez NP  Department of Hospital Medicine   [facility] ([facility])

## 2025-02-19 NOTE — NURSING
Pt transferred to room 232 via wheel chair. All personal belongings sent with pt, pt tolerated well.

## 2025-02-19 NOTE — SUBJECTIVE & OBJECTIVE
Past Medical History:   Diagnosis Date    ADD (attention deficit disorder)     Anxiety     Back pain     Depression     Gout     Neuromuscular disorder        Past Surgical History:   Procedure Laterality Date    BACK SURGERY  10/13/2016    NECK SURGERY         Review of patient's allergies indicates:   Allergen Reactions    Ancef [cefazolin] Other (See Comments)     Headaches and dizziness       Family History       Problem Relation (Age of Onset)    Arthritis Father    Pulmonary fibrosis Mother          Tobacco Use    Smoking status: Some Days     Current packs/day: 1.00     Types: Cigarettes    Smokeless tobacco: Never   Substance and Sexual Activity    Alcohol use: Yes     Comment: occassionally     Drug use: No    Sexual activity: Not on file         Review of Systems   Constitutional:  Negative for chills and fever.   HENT:  Positive for congestion. Negative for sore throat and trouble swallowing.    Respiratory:  Positive for cough and shortness of breath. Negative for choking and wheezing.    Cardiovascular:  Negative for chest pain.   Gastrointestinal: Negative.    Endocrine: Negative.    Genitourinary: Negative.    Musculoskeletal:  Positive for back pain (chronic).   Skin:  Negative for wound.   Neurological:  Negative for dizziness, tremors and headaches.     Objective:     Vital Signs (Most Recent):  Temp: 98.2 °F (36.8 °C) (02/18/25 2100)  Pulse: 96 (02/18/25 1917)  Resp: 20 (02/18/25 1917)  BP: 116/75 (02/18/25 1905)  SpO2: 100 % (02/18/25 1917) Vital Signs (24h Range):  Temp:  [98 °F (36.7 °C)-98.2 °F (36.8 °C)] 98.2 °F (36.8 °C)  Pulse:  [] 96  Resp:  [18-20] 20  SpO2:  [97 %-100 %] 100 %  BP: (111-136)/(67-78) 116/75     Weight: 58.4 kg (128 lb 12 oz)  Body mass index is 19.58 kg/m².    No intake or output data in the 24 hours ending 02/18/25 2115     Physical Exam  Vitals and nursing note reviewed.   Constitutional:       General: He is awake. He is not in acute distress.     Appearance: He  is normal weight. He is not ill-appearing.      Interventions: Nasal cannula in place.   HENT:      Head: Atraumatic.   Eyes:      Conjunctiva/sclera: Conjunctivae normal.   Neck:      Vascular: No JVD.   Cardiovascular:      Rate and Rhythm: Normal rate and regular rhythm.      Pulses:           Radial pulses are 2+ on the right side and 2+ on the left side.        Popliteal pulses are 2+ on the right side and 2+ on the left side.   Pulmonary:      Effort: Pulmonary effort is normal.      Breath sounds: Decreased breath sounds present. No wheezing, rhonchi or rales.   Abdominal:      General: Bowel sounds are normal. There is no distension.      Palpations: Abdomen is soft.   Musculoskeletal:      Right lower leg: No edema.      Left lower leg: No edema.   Skin:     General: Skin is warm and dry.      Capillary Refill: Capillary refill takes less than 2 seconds.   Neurological:      Mental Status: He is alert and oriented to person, place, and time.      GCS: GCS eye subscore is 4. GCS verbal subscore is 5. GCS motor subscore is 6.      Cranial Nerves: Cranial nerves 2-12 are intact.   Psychiatric:         Attention and Perception: Attention normal.         Mood and Affect: Mood normal.         Speech: Speech normal.         Behavior: Behavior normal. Behavior is cooperative.         Thought Content: Thought content normal.          Vents:       Lines/Drains/Airways       Drain  Duration                  Chest Tube 02/18/25 2111 Tube - 1 Right <1 day              Peripheral Intravenous Line  Duration                  Peripheral IV - Single Lumen 02/18/25 1650 18 G Anterior;Right Forearm <1 day                    Significant Labs:    CBC/Anemia Profile:  Recent Labs   Lab 02/18/25  1651   WBC 10.83   HGB 12.1*   HCT 34.7*   *   MCV 89   RDW 13.2        Chemistries:  Recent Labs   Lab 02/18/25  1751      K 4.2   CL 99   CO2 28   BUN 18   CREATININE 0.8   CALCIUM 9.2   ALBUMIN 2.9*   PROT 8.5*   BILITOT  0.2   ALKPHOS 61   ALT 15   AST 22       All pertinent labs within the past 24 hours have been reviewed.    Significant Imaging:   I have reviewed all pertinent imaging results/findings within the past 24 hours.

## 2025-02-19 NOTE — SUBJECTIVE & OBJECTIVE
Objective:     Vital Signs (Most Recent):  Temp: 97.2 °F (36.2 °C) (02/19/25 0301)  Pulse: 77 (02/19/25 0600)  Resp: 14 (02/19/25 0600)  BP: (!) 150/84 (02/19/25 0600)  SpO2: 100 % (02/19/25 0600) Vital Signs (24h Range):  Temp:  [97.2 °F (36.2 °C)-98.2 °F (36.8 °C)] 97.2 °F (36.2 °C)  Pulse:  [] 77  Resp:  [14-26] 14  SpO2:  [95 %-100 %] 100 %  BP: (100-150)/(62-84) 150/84     Weight: 58 kg (127 lb 13.9 oz)  Body mass index is 19.44 kg/m².      Intake/Output Summary (Last 24 hours) at 2/19/2025 0805  Last data filed at 2/19/2025 0527  Gross per 24 hour   Intake --   Output 915 ml   Net -915 ml        Physical Exam      Review of Systems   Respiratory:  Positive for cough and shortness of breath. Negative for chest tightness, wheezing and stridor.    Cardiovascular:  Negative for chest pain, palpitations and leg swelling.   Gastrointestinal:  Negative for abdominal pain, constipation, diarrhea and nausea.     O2: Room air    Lines/Drains/Airways       Drain  Duration                  Chest Tube 02/18/25 2111 Tube - 1 Right <1 day              Peripheral Intravenous Line  Duration                  Peripheral IV - Single Lumen 02/18/25 1650 18 G Anterior;Right Forearm <1 day                    Significant Labs:    CBC/Anemia Profile:  Recent Labs   Lab 02/18/25  1651 02/19/25  0624   WBC 10.83 8.57   HGB 12.1* 13.6*   HCT 34.7* 41.5   * 412   MCV 89 93   RDW 13.2 12.9        Chemistries:  Recent Labs   Lab 02/18/25  1751      K 4.2   CL 99   CO2 28   BUN 18   CREATININE 0.8   CALCIUM 9.2   ALBUMIN 2.9*   PROT 8.5*   BILITOT 0.2   ALKPHOS 61   ALT 15   AST 22       All pertinent labs within the past 24 hours have been reviewed.    Significant Imaging:  I have reviewed all pertinent imaging results/findings within the past 24 hours.

## 2025-02-19 NOTE — ASSESSMENT & PLAN NOTE
Concerning for interstitial lung disease  Anticipate VATS need for pneumothoraces and likely benefit from lung biopsy  Full eval by pulmonologist and CTS

## 2025-02-19 NOTE — ASSESSMENT & PLAN NOTE
Concerning for interstitial lung disease  Anticipate VATS need for pneumothoraces and likely benefit from lung biopsy  Full eval by pulmonologist and CTS in am

## 2025-02-19 NOTE — CONSULTS
[facility] ([facility])  Cardiothoracic Surgery  Consult Note    Patient Name: Octavio Pang  MRN: [id number]  Admission Date: 2/18/2025  Attending Physician: Shelli Waterman,*  Referring Provider: Self, Aaareferral    Patient information was obtained from patient, ER records, and primary team.     Inpatient consult to Cardiothoracic Surgery  Consult performed by: Tim Moss MD  Consult ordered by: Ledy Mast ACNP-BC        Subjective:     Chief Complaint/Reason for Admission:  Right-sided spontaneous pneumothorax    History of Present Illness:  This 59-year-old male with a  smoker for lifelong has not seen a pulmonologist was seen in the [facility] ER for a spontaneous pneumothorax on the right side was transferred for further management.  The patient had a cook catheter placed in the right chest and was connected to Pleur-evac.  The patient does not have any significant cardiorespiratory history.    No current facility-administered medications on file prior to encounter.     Current Outpatient Medications on File Prior to Encounter   Medication Sig    allopurinol (ZYLOPRIM) 100 MG tablet Take 100 mg by mouth 3 (three) times daily.    alprazolam (XANAX) 1 MG tablet Take 2 mg by mouth nightly as needed for Anxiety.     clonazePAM (KLONOPIN) 1 MG tablet Take 1 mg by mouth 2 (two) times daily as needed for Anxiety.    gabapentin (NEURONTIN) 100 MG capsule Take 100 mg by mouth as needed.    ibuprofen (ADVIL,MOTRIN) 200 MG tablet Take 200 mg by mouth.    indomethacin (INDOCIN) 25 MG capsule 2 caps 3 times a day with food until gout is better then 1 twice a day for 5 more days    lisdexamfetamine (VYVANSE) 30 MG capsule Take 30 mg by mouth every morning.    methocarbamol (ROBAXIN) 500 MG Tab Take 500 mg by mouth.    methylPREDNISolone (MEDROL DOSEPACK) 4 mg tablet As per package label    naproxen (NAPROSYN) 500 MG tablet Take 1 tablet (500 mg total) by mouth 2 (two)  times daily with meals.       Review of patient's allergies indicates:   Allergen Reactions    Ancef [cefazolin] Other (See Comments)     Headaches and dizziness       Past Medical History:   Diagnosis Date    ADD (attention deficit disorder)     Anxiety     Back pain     Depression     Gout     Neuromuscular disorder      Past Surgical History:   Procedure Laterality Date    BACK SURGERY  10/13/2016    NECK SURGERY       Family History       Problem Relation (Age of Onset)    Arthritis Father    Pulmonary fibrosis Mother          Tobacco Use    Smoking status: Some Days     Current packs/day: 1.00     Types: Cigarettes    Smokeless tobacco: Never   Substance and Sexual Activity    Alcohol use: Yes     Comment: occassionally     Drug use: No    Sexual activity: Not on file     Review of Systems   Constitutional:  Negative for activity change and appetite change.   HENT:  Negative for dental problem, nosebleeds and sore throat.    Eyes:  Negative for discharge and visual disturbance.   Respiratory:  Positive for cough, shortness of breath and wheezing. Negative for chest tightness and stridor.    Cardiovascular:  Negative for leg swelling.   Gastrointestinal:  Negative for abdominal distention and abdominal pain.   Genitourinary:  Negative for difficulty urinating and dysuria.   Musculoskeletal:  Negative for arthralgias, back pain and joint swelling.   Allergic/Immunologic: Negative for environmental allergies.   Neurological:  Negative for dizziness, syncope and headaches.   Hematological:  Does not bruise/bleed easily.   Psychiatric/Behavioral:  Negative for behavioral problems.      Objective:     Vital Signs (Most Recent):  Temp: 98.1 °F (36.7 °C) (02/19/25 0700)  Pulse: 100 (02/19/25 0915)  Resp: 20 (02/19/25 0915)  BP: 106/62 (02/19/25 0915)  SpO2: 96 % (02/19/25 0915) Vital Signs (24h Range):  Temp:  [97.2 °F (36.2 °C)-98.2 °F (36.8 °C)] 98.1 °F (36.7 °C)  Pulse:  [] 100  Resp:  [14-28] 20  SpO2:  [92  %-100 %] 96 %  BP: ()/(56-84) 106/62     Weight: 58 kg (127 lb 13.9 oz)  Body mass index is 19.44 kg/m².    SpO2: 96 %        Intake/Output - Last 3 Shifts         02/17 0700  02/18 0659 02/18 0700  02/19 0659 02/19 0700  02/20 0659    Urine (mL/kg/hr)  850 300 (2.2)    Chest Tube  65 20    Total Output  915 320    Net  -915 -320                    Lines/Drains/Airways       Drain  Duration                  Chest Tube 02/18/25 2111 Tube - 1 Right <1 day              Peripheral Intravenous Line  Duration                  Peripheral IV - Single Lumen 02/18/25 1650 18 G Anterior;Right Forearm <1 day                     STS Risk Score:     Physical Exam  Vitals reviewed.   Constitutional:       Appearance: Normal appearance.   HENT:      Head: Normocephalic and atraumatic.      Mouth/Throat:      Mouth: Mucous membranes are moist.   Eyes:      Extraocular Movements: Extraocular movements intact.   Cardiovascular:      Rate and Rhythm: Normal rate and regular rhythm.      Pulses: Normal pulses.      Heart sounds: Normal heart sounds.   Pulmonary:      Effort: Pulmonary effort is normal.      Breath sounds: Rhonchi and rales present.      Comments: Right-sided cook catheter tend Pleur-Evac the Pleur-Evac is tightening and intermittent air leak.  Abdominal:      Palpations: Abdomen is soft.   Musculoskeletal:         General: Normal range of motion.      Cervical back: Normal range of motion and neck supple.   Skin:     General: Skin is warm.      Capillary Refill: Capillary refill takes less than 2 seconds.   Neurological:      General: No focal deficit present.      Mental Status: He is alert and oriented to person, place, and time.   Psychiatric:         Mood and Affect: Mood normal.         Significant Labs:  BMP:   Recent Labs   Lab 02/19/25 0624   GLU 82   *   K 4.5      CO2 21*   BUN 12   CREATININE 0.8   CALCIUM 9.3   MG 1.7     CBC:   Recent Labs   Lab 02/19/25 0624   WBC 8.57   RBC 4.46*   HGB  13.6*   HCT 41.5      MCV 93   MCH 30.5   MCHC 32.8       Significant Diagnostics:  CT: I have reviewed all pertinent results/findings within the past 24 hours  CXR: I have reviewed all pertinent results/findings within the past 24 hours    Assessment/Plan:   59-year-old male smoker for life  with pan acinar emphysema severe with very low lung volume which is poor quality with a spontaneous pneumothorax and a 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  He will be a poor candidate for bleb resection since it is a severe disease.  We can upgraded to a large bore chest tube and connect a Heimlich valve to be followed as an outpatient procedure we will be a good option for him.  NYHA Score: NYHA II: slight limitation of physical activity, comfortable at rest    Active Diagnoses:    Diagnosis Date Noted POA    PRINCIPAL PROBLEM:  Spontaneous pneumothorax [J93.83] 02/18/2025 Yes    Tobacco smoker, 1 pack of cigarettes or less per day [F17.210] 02/18/2025 Yes    Anxiety [F41.9] 02/18/2025 Yes    Abnormal CT of the chest [R93.89] 02/18/2025 Yes      Problems Resolved During this Admission:       Thank you for your consult. I will follow-up with patient. Please contact us if you have any additional questions.    Tim Moss MD  Cardiothoracic Surgery  [facility] ([facility])

## 2025-02-19 NOTE — SUBJECTIVE & OBJECTIVE
Objective:     Vital Signs (Most Recent):  Temp: 97.2 °F (36.2 °C) (02/19/25 0301)  Pulse: 104 (02/19/25 0830)  Resp: (!) 24 (02/19/25 0830)  BP: 111/62 (02/19/25 0800)  SpO2: (!) 92 % (02/19/25 0830) Vital Signs (24h Range):  Temp:  [97.2 °F (36.2 °C)-98.2 °F (36.8 °C)] 97.2 °F (36.2 °C)  Pulse:  [] 104  Resp:  [14-28] 24  SpO2:  [92 %-100 %] 92 %  BP: (100-150)/(61-84) 111/62     Weight: 58 kg (127 lb 13.9 oz)  Body mass index is 19.44 kg/m².      Intake/Output Summary (Last 24 hours) at 2/19/2025 0836  Last data filed at 2/19/2025 0527  Gross per 24 hour   Intake --   Output 915 ml   Net -915 ml        Physical Exam  Constitutional:       General: He is not in acute distress.     Appearance: He is cachectic. He is ill-appearing.   HENT:      Head: Normocephalic and atraumatic.      Nose: Nose normal.      Mouth/Throat:      Mouth: Mucous membranes are moist.   Eyes:      Pupils: Pupils are equal, round, and reactive to light.   Cardiovascular:      Rate and Rhythm: Normal rate and regular rhythm.      Pulses: Normal pulses.      Heart sounds: Normal heart sounds.   Pulmonary:      Effort: Pulmonary effort is normal.      Breath sounds: Rhonchi and rales present.      Comments: R chest tube in place  Abdominal:      General: Abdomen is flat. There is no distension.      Palpations: Abdomen is soft.      Tenderness: There is no abdominal tenderness.   Musculoskeletal:         General: No swelling.   Skin:     General: Skin is warm and dry.   Neurological:      General: No focal deficit present.      Mental Status: He is alert and oriented to person, place, and time. Mental status is at baseline.      Motor: No weakness.           Review of Systems   Respiratory:  Positive for cough and shortness of breath. Negative for chest tightness, wheezing and stridor.    Cardiovascular:  Negative for chest pain, palpitations and leg swelling.   Gastrointestinal:  Negative for abdominal pain, constipation, diarrhea and  nausea.     O2: Room air    Lines/Drains/Airways       Drain  Duration                  Chest Tube 02/18/25 2111 Tube - 1 Right <1 day              Peripheral Intravenous Line  Duration                  Peripheral IV - Single Lumen 02/18/25 1650 18 G Anterior;Right Forearm <1 day                    Significant Labs:    CBC/Anemia Profile:  Recent Labs   Lab 02/18/25  1651 02/19/25  0624   WBC 10.83 8.57   HGB 12.1* 13.6*   HCT 34.7* 41.5   * 412   MCV 89 93   RDW 13.2 12.9        Chemistries:  Recent Labs   Lab 02/18/25  1751 02/19/25  0624    135*   K 4.2 4.5   CL 99 102   CO2 28 21*   BUN 18 12   CREATININE 0.8 0.8   CALCIUM 9.2 9.3   ALBUMIN 2.9*  --    PROT 8.5*  --    BILITOT 0.2  --    ALKPHOS 61  --    ALT 15  --    AST 22  --    MG  --  1.7       All pertinent labs within the past 24 hours have been reviewed.    Significant Imaging:  I have reviewed all pertinent imaging results/findings within the past 24 hours.

## 2025-02-19 NOTE — H&P
[facility] ([facility])  Critical Care Medicine  History & Physical    Patient Name: Octavio Pang  MRN: [id number]  Admission Date: 2/18/2025  Hospital Length of Stay: 0 days  Code Status: Full Code  Attending Physician: Cayetano Cortez MD   Primary Care Provider: Herman Quan MD   Principal Problem: Spontaneous pneumothorax    Subjective:     HPI:  59 year old male pack a day tobacco smoker with known medical issues including ADHD, anxiety, depression, and back pain    Presented to PCP on 2/18 with c/o 2 months SOB not relieved with use of home inhalers  CXR showed bilateral pneumothoraces Rt > Lt  Sent to [facility] ED for further eval/management  Follow up CXR with ongoing R > L pneumothoraces  Small bore CT placed on rt  Follow up CT chest shows Tiny left apical pneumothorax. Mild moderate right-sided pneumothorax. Mild moderate subpleural reticular and subpleural opacity in the right greater than left lung. Anterior right pleural drainage catheter is identified. Heart is not enlarged. No aneurysm. No pulmonary embolism. Mild right sided pleural effusion. Slight left-sided pleural effusion. Coronary artery calcification. Mediastinal lymph nodes behind the left atrium suspected.     Labs unremarkable    Seen and examined on arrival to [facility] ICU  AAOx3, no distress  Reports SOB and cough x 2 months with cough and GOMEZ increasing last week. No improvement with trial of inhaler. Decreased tobacco smoking during this time s/t SOB. Endorses h/o plant work, welding, grinding, and known asbestos exposures.    Hospital/ICU Course:  No notes on file     Past Medical History:   Diagnosis Date    ADD (attention deficit disorder)     Anxiety     Back pain     Depression     Gout     Neuromuscular disorder        Past Surgical History:   Procedure Laterality Date    BACK SURGERY  10/13/2016    NECK SURGERY         Review of patient's allergies indicates:   Allergen Reactions    Ancef [cefazolin] Other  (See Comments)     Headaches and dizziness       Family History       Problem Relation (Age of Onset)    Arthritis Father    Pulmonary fibrosis Mother          Tobacco Use    Smoking status: Some Days     Current packs/day: 1.00     Types: Cigarettes    Smokeless tobacco: Never   Substance and Sexual Activity    Alcohol use: Yes     Comment: occassionally     Drug use: No    Sexual activity: Not on file         Review of Systems   Constitutional:  Negative for chills and fever.   HENT:  Positive for congestion. Negative for sore throat and trouble swallowing.    Respiratory:  Positive for cough and shortness of breath. Negative for choking and wheezing.    Cardiovascular:  Negative for chest pain.   Gastrointestinal: Negative.    Endocrine: Negative.    Genitourinary: Negative.    Musculoskeletal:  Positive for back pain (chronic).   Skin:  Negative for wound.   Neurological:  Negative for dizziness, tremors and headaches.     Objective:     Vital Signs (Most Recent):  Temp: 98.2 °F (36.8 °C) (02/18/25 2100)  Pulse: 96 (02/18/25 1917)  Resp: 20 (02/18/25 1917)  BP: 116/75 (02/18/25 1905)  SpO2: 100 % (02/18/25 1917) Vital Signs (24h Range):  Temp:  [98 °F (36.7 °C)-98.2 °F (36.8 °C)] 98.2 °F (36.8 °C)  Pulse:  [] 96  Resp:  [18-20] 20  SpO2:  [97 %-100 %] 100 %  BP: (111-136)/(67-78) 116/75     Weight: 58.4 kg (128 lb 12 oz)  Body mass index is 19.58 kg/m².    No intake or output data in the 24 hours ending 02/18/25 2115     Physical Exam  Vitals and nursing note reviewed.   Constitutional:       General: He is awake. He is not in acute distress.     Appearance: He is normal weight. He is not ill-appearing.      Interventions: Nasal cannula in place.   HENT:      Head: Atraumatic.   Eyes:      Conjunctiva/sclera: Conjunctivae normal.   Neck:      Vascular: No JVD.   Cardiovascular:      Rate and Rhythm: Normal rate and regular rhythm.      Pulses:           Radial pulses are 2+ on the right side and 2+ on the  left side.        Popliteal pulses are 2+ on the right side and 2+ on the left side.   Pulmonary:      Effort: Pulmonary effort is normal.      Breath sounds: Decreased breath sounds present. No wheezing, rhonchi or rales.   Abdominal:      General: Bowel sounds are normal. There is no distension.      Palpations: Abdomen is soft.   Musculoskeletal:      Right lower leg: No edema.      Left lower leg: No edema.   Skin:     General: Skin is warm and dry.      Capillary Refill: Capillary refill takes less than 2 seconds.   Neurological:      Mental Status: He is alert and oriented to person, place, and time.      GCS: GCS eye subscore is 4. GCS verbal subscore is 5. GCS motor subscore is 6.      Cranial Nerves: Cranial nerves 2-12 are intact.   Psychiatric:         Attention and Perception: Attention normal.         Mood and Affect: Mood normal.         Speech: Speech normal.         Behavior: Behavior normal. Behavior is cooperative.         Thought Content: Thought content normal.          Vents:       Lines/Drains/Airways       Drain  Duration                  Chest Tube 02/18/25 2111 Tube - 1 Right <1 day              Peripheral Intravenous Line  Duration                  Peripheral IV - Single Lumen 02/18/25 1650 18 G Anterior;Right Forearm <1 day                    Significant Labs:    CBC/Anemia Profile:  Recent Labs   Lab 02/18/25  1651   WBC 10.83   HGB 12.1*   HCT 34.7*   *   MCV 89   RDW 13.2        Chemistries:  Recent Labs   Lab 02/18/25  1751      K 4.2   CL 99   CO2 28   BUN 18   CREATININE 0.8   CALCIUM 9.2   ALBUMIN 2.9*   PROT 8.5*   BILITOT 0.2   ALKPHOS 61   ALT 15   AST 22       All pertinent labs within the past 24 hours have been reviewed.    Significant Imaging:   I have reviewed all pertinent imaging results/findings within the past 24 hours.  Assessment/Plan:     Psychiatric  Anxiety  Continue home dose prn xanax    Pulmonary  * Spontaneous pneumothorax  Bilateral, right >  left  - maintain right CT to atrium suction  - daily CXR monitoring  - consult CTS to evaluate for possible VATS, biopsy    Other  Abnormal CT of the chest  Concerning for interstitial lung disease  Anticipate VATS need for pneumothoraces and likely benefit from lung biopsy  Full eval by pulmonologist and CTS in am    Tobacco smoker, 1 pack of cigarettes or less per day  -provide cessation education and evaluate readiness to stop  -add NRT if desired        Critical Care Daily Checklist:    A: Awake: RASS Goal/Actual Goal:    Actual:     B: Spontaneous Breathing Trial Performed?     C: SAT & SBT Coordinated?  N/a                      D: Delirium: CAM-ICU     E: Early Mobility Performed? Yes   F: Feeding Goal:    Status:     Current Diet Order   No orders of the defined types were placed in this encounter.      AS: Analgesia/Sedation prn   T: Thromboembolic Prophylaxis lovenox   H: HOB > 300 Yes   U: Stress Ulcer Prophylaxis (if needed) pepcid   G: Glucose Control monitor   B: Bowel Function     I: Indwelling Catheter (Lines & Carroll) Necessity reviewed   D: De-escalation of Antimicrobials/Pharmacotherapies reviewed    Plan for the day/ETD As above    Code Status:  Family/Goals of Care: Full Code  Goal home on discharge        JALIL Rodriguez-BC  Critical Care Medicine  [facility] (Hospital)

## 2025-02-19 NOTE — EICU
EICU  NOTE:    Patient has already been seen by the bedside provider.  Patient is with spontaneous pneumothorax and chest tube was placed on the right side.  CT surgery service will be consulted.  Continue rest of supportive care.    Thank You for allowing EICU to participate in the care of the patient. Please call as needed    Juwan Vargas MD  [facility]  [phone]

## 2025-02-19 NOTE — PLAN OF CARE
[facility] - Intensive Care (Hospital)  Initial Discharge Assessment       Primary Care Provider: Herman Quan MD    Admission Diagnosis: SOB (shortness of breath) [R06.02]  Bilateral pneumothoraces [J93.9]  Chest tube in place [Z96.89]  Cough, unspecified type [R05.9]    Admission Date: 2/18/2025  Expected Discharge Date:     Transition of Care Barriers: None    Payor: HU[employer] MANAGED MEDICARE / Plan: HU[employer]NA MEDICARE HMO / Product Type: Capitation /     Extended Emergency Contact Information  Primary Emergency Contact: Sol Carroll  Address: [address] [place] [address] [facility]  Home Phone: [phone]  Work Phone: [phone]  Mobile Phone: [phone]  Relation: Sister  Secondary Emergency Contact: Snowden,Sussy   [facility]  Home Phone: [phone]  Relation: Significant other    Discharge Plan A: Home Health         [facility] [place] [address]  [address]  [place]  [place] [address]  Phone: [phone] Fax: [phone]    [id number] - [place], [place] - [address] [facility] [id number]  [place] [address]  Phone: [phone] Fax: [phone]      Initial Assessment (most recent)       Adult Discharge Assessment - 02/19/25 1023          Discharge Assessment    Assessment Type Discharge Planning Assessment     Confirmed/corrected address, phone number and insurance Yes     Confirmed Demographics Correct on Facesheet     Source of Information patient     When was your last doctors appointment? 02/17/25     Communicated GUILLE with patient/caregiver Date not available/Unable to determine     Reason For Admission Spontaneous pneumothorax     People in Home alone     Facility Arrived From: home     Do you expect to return to your current living situation? Yes     Do you have help at home or someone to help you manage your care at home? Yes     Who are your  caregiver(s) and their phone number(s)? Sol santos     Prior to hospitilization cognitive status: Alert/Oriented     Current cognitive status: Alert/Oriented     Walking or Climbing Stairs Difficulty no     Dressing/Bathing Difficulty no     Home Accessibility wheelchair accessible     Home Layout Able to live on 1st floor     Equipment Currently Used at Home none     Readmission within 30 days? No     Patient currently being followed by outpatient case management? No     Do you currently have service(s) that help you manage your care at home? No     Do you take prescription medications? Yes     Do you have prescription coverage? Yes     Coverage MCR     Do you have any problems affording any of your prescribed medications? No     Is the patient taking medications as prescribed? yes     Who is going to help you get home at discharge? Sol santos     How do you get to doctors appointments? car, drives self     Are you on dialysis? No     Do you take coumadin? No     Discharge Plan A Home Health     DME Needed Upon Discharge  none     Discharge Plan discussed with: Patient     Transition of Care Barriers None                   Anticipated DC dispo: home health   Prior Level of Function: independent with ADLs  People in home:  lives alone     Comments:  CM met with patient at bedside to introduce role and discuss discharge planning. Sol Santos, will be help at home and can provide transport at time of discharge. Confirmed demographics, insurance, and emergency contacts. CM discharge needs depends on hospital progress. CM will continue following to assist with other needs. Discharge plan has been determined by review of patient's clinical status, future medical and therapeutic needs, and coverage/benefits for post-acute care in coordination with multidisciplinary team members.

## 2025-02-19 NOTE — HOSPITAL COURSE
Patient was admitted for spontaneous pneumothorax on right.  Chest tube was placed.  Cardiothoracic surgery consult on case. VATS with chemical pleurodesis performed.  Patient tolerated procedure well.  Chest tube was changed to Heimlich valve.  Repeat chest x-ray stable.  Patient was cleared for discharge by CTS.  He was discharged home.

## 2025-02-19 NOTE — HPI
59 year old male pack a day tobacco smoker with known medical issues including ADHD, anxiety, depression, and back pain    Presented to PCP on 2/18 with c/o 2 months SOB not relieved with use of home inhalers  CXR showed bilateral pneumothoraces Rt > Lt  Sent to [facility] ED for further eval/management  Follow up CXR with ongoing R > L pneumothoraces  Small bore CT placed on rt  Follow up CT chest shows Tiny left apical pneumothorax. Mild moderate right-sided pneumothorax. Mild moderate subpleural reticular and subpleural opacity in the right greater than left lung. Anterior right pleural drainage catheter is identified. Heart is not enlarged. No aneurysm. No pulmonary embolism. Mild right sided pleural effusion. Slight left-sided pleural effusion. Coronary artery calcification. Mediastinal lymph nodes behind the left atrium suspected.     Labs unremarkable    Seen and examined on arrival to [facility] ICU  AAOx3, no distress  Reports SOB and cough x 2 months with cough and GOMEZ increasing last week. No improvement with trial of inhaler. Decreased tobacco smoking during this time s/t SOB. Endorses h/o plant work, welding, grinding, and known asbestos exposures.

## 2025-02-19 NOTE — PLAN OF CARE
AAO, 2L NC SR- ST on monitor. VSS. CT in place. 65 in chamber. MAEW. Voids to urinal. Good for downgrade.

## 2025-02-19 NOTE — NURSING
Pt  transported  via wheelchair with PO Rodriguez. Pt  appears  to be coughing a lot,  stating he cannot catch his  breath. Once  asked,  pt stated that he  has  thick phlegm that  he tries to  get up. Education done on drinking water to thinning mucous.  Icu  nurse  denied  any  episodes similar to this  one. O2 placed on pt for comfort.  Cooling  room temperature down offered, but refused. Chest tube checked. No kinks, no present air  leak, and dressing is without  complication. Dr. Waterman notified.  Cough  medicine added.

## 2025-02-19 NOTE — HPI
59-year-old male with a known past medical history which includes ADHD, anxiety, depression, back pain, and tobacco abuse (reports less than 1 ppd) since age of 18 that presented to his PCP for evaluation of worsening shortness of breath over the past couple of months.  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  Patient underwent chest x-ray and he reports a few hours later he was called and told to go to the ER as he had bilateral pneumothoraces and concern for pneumonia on his chest x-ray.  At the time of arrival in the ER, patient underwent another x-ray which did reveal right greater than left bilateral pneumothoraces and a small bore chest tube was placed on the right.  Follow up x-rays with tiny left apical pneumothorax was still a moderate size right pneumothorax.  Patient was admitted to the ICU for close observation.  Patient remains on room air with adequate O2 sats.  He has no other complaints.  He is deemed stable for transfer out of the ICU to the floor in the morning of 2/19 for which Hospital Medicine was consulted to assume care.  CTS has been consulted for further evaluation and management of right pneumothorax.     At the time of my exam in the ICU, the pt is awake and alert on RA in NAD. He quite thin. He is able to provide history and answer questions. Right sided pigtail chest tube in place connected to suction, but float not activated -- have discussed with nursing to ensure he is at -20 cm suction. Pt awaiting transfer to the floor.

## 2025-02-19 NOTE — SUBJECTIVE & OBJECTIVE
Past Medical History:   Diagnosis Date    ADD (attention deficit disorder)     Anxiety     Back pain     Depression     Gout     Neuromuscular disorder        Past Surgical History:   Procedure Laterality Date    BACK SURGERY  10/13/2016    NECK SURGERY         Review of patient's allergies indicates:   Allergen Reactions    Ancef [cefazolin] Other (See Comments)     Headaches and dizziness       No current facility-administered medications on file prior to encounter.     Current Outpatient Medications on File Prior to Encounter   Medication Sig    allopurinol (ZYLOPRIM) 100 MG tablet Take 100 mg by mouth 3 (three) times daily.    alprazolam (XANAX) 1 MG tablet Take 2 mg by mouth nightly as needed for Anxiety.     clonazePAM (KLONOPIN) 1 MG tablet Take 1 mg by mouth 2 (two) times daily as needed for Anxiety.    gabapentin (NEURONTIN) 100 MG capsule Take 100 mg by mouth as needed.    ibuprofen (ADVIL,MOTRIN) 200 MG tablet Take 200 mg by mouth.    indomethacin (INDOCIN) 25 MG capsule 2 caps 3 times a day with food until gout is better then 1 twice a day for 5 more days    lisdexamfetamine (VYVANSE) 30 MG capsule Take 30 mg by mouth every morning.    methocarbamol (ROBAXIN) 500 MG Tab Take 500 mg by mouth.    methylPREDNISolone (MEDROL DOSEPACK) 4 mg tablet As per package label    naproxen (NAPROSYN) 500 MG tablet Take 1 tablet (500 mg total) by mouth 2 (two) times daily with meals.     Family History       Problem Relation (Age of Onset)    Arthritis Father    Pulmonary fibrosis Mother          Tobacco Use    Smoking status: Some Days     Current packs/day: 1.00     Types: Cigarettes    Smokeless tobacco: Never   Substance and Sexual Activity    Alcohol use: Yes     Comment: occassionally     Drug use: No    Sexual activity: Not on file     Review of Systems   Constitutional:  Negative for fever.   HENT:  Negative for congestion.    Respiratory:  Positive for cough (intermittent). Negative for shortness of breath.     Cardiovascular:  Negative for leg swelling.   Gastrointestinal:  Negative for abdominal distention.   All other systems reviewed and are negative.    Objective:     Vital Signs (Most Recent):  Temp: 97.2 °F (36.2 °C) (02/19/25 0301)  Pulse: 77 (02/19/25 0600)  Resp: 14 (02/19/25 0600)  BP: (!) 150/84 (02/19/25 0600)  SpO2: 100 % (02/19/25 0600) Vital Signs (24h Range):  Temp:  [97.2 °F (36.2 °C)-98.2 °F (36.8 °C)] 97.2 °F (36.2 °C)  Pulse:  [] 77  Resp:  [14-26] 14  SpO2:  [95 %-100 %] 100 %  BP: (100-150)/(62-84) 150/84     Weight: 58 kg (127 lb 13.9 oz)  Body mass index is 19.44 kg/m².     Physical Exam  Vitals reviewed.   Constitutional:       General: He is awake.      Appearance: He is underweight.   HENT:      Mouth/Throat:      Dentition: Abnormal dentition. Dental caries present.   Cardiovascular:      Rate and Rhythm: Normal rate.      Pulses:           Radial pulses are 2+ on the right side and 2+ on the left side.   Pulmonary:      Effort: Pulmonary effort is normal.      Breath sounds: Decreased breath sounds present.      Comments: R pigtail CT in place to suction, on RA  Abdominal:      General: Abdomen is flat. There is no distension.      Palpations: Abdomen is soft.   Musculoskeletal:      Comments: EPSTEIN   Skin:     General: Skin is warm and dry.   Neurological:      General: No focal deficit present.      Mental Status: He is alert.   Psychiatric:         Attention and Perception: Attention normal.         Behavior: Behavior is cooperative.      Comments: calm          Significant Labs: All pertinent labs within the past 24 hours have been reviewed.    Significant Imaging: I have reviewed all pertinent imaging results/findings within the past 24 hours.

## 2025-02-19 NOTE — ASSESSMENT & PLAN NOTE
Bilateral, right > left  - maintain right CT to atrium suction  - daily CXR monitoring  - consult CTS to evaluate for possible VATS, biopsy

## 2025-02-19 NOTE — ASSESSMENT & PLAN NOTE
-spontaneous bilateral pneumothoraces present on admit, right-greater-than-left  -continue right pigtail chest tube to -20 cm of suction   -CTS consulted further evaluation and recommendations  -patient on room air, goal O2 sat greater than 92%  -daily chest x-rays  -? underlying ILD v other given environmental exposures  -would benefit from establishing with pulm as an outpt as he has underlying lung disease and reports has no evaluation of it

## 2025-02-19 NOTE — PROGRESS NOTES
[facility] - Intensive Care ([facility])  Critical Care Medicine  Progress Note    Patient Name: Octavio Pang  MRN: [id number]  Admission Date: 2/18/2025  Hospital Length of Stay: 1 days  Code Status: Full Code  Attending Provider: Cayetano Cortez MD  Primary Care Provider: Herman Quan MD   Principal Problem: Spontaneous pneumothorax    Subjective:     HPI:  59 year old male pack a day tobacco smoker with known medical issues including ADHD, anxiety, depression, and back pain    Presented to PCP on 2/18 with c/o 2 months SOB not relieved with use of home inhalers  CXR showed bilateral pneumothoraces Rt > Lt  Sent to [facility] ED for further eval/management  Follow up CXR with ongoing R > L pneumothoraces  Small bore CT placed on rt  Follow up CT chest shows Tiny left apical pneumothorax. Mild moderate right-sided pneumothorax. Mild moderate subpleural reticular and subpleural opacity in the right greater than left lung. Anterior right pleural drainage catheter is identified. Heart is not enlarged. No aneurysm. No pulmonary embolism. Mild right sided pleural effusion. Slight left-sided pleural effusion. Coronary artery calcification. Mediastinal lymph nodes behind the left atrium suspected.     Labs unremarkable    Seen and examined on arrival to [facility] ICU  AAOx3, no distress  Reports SOB and cough x 2 months with cough and GOMEZ increasing last week. No improvement with trial of inhaler. Decreased tobacco smoking during this time s/t SOB. Endorses h/o plant work, welding, grinding, and known asbestos exposures.    Hospital/ICU Course:  02/19/2025: Patient on room air, SOB has improved. CTS consult pending. Stable to SD to hosp med.    Objective:     Vital Signs (Most Recent):  Temp: 97.2 °F (36.2 °C) (02/19/25 0301)  Pulse: 104 (02/19/25 0830)  Resp: (!) 24 (02/19/25 0830)  BP: 111/62 (02/19/25 0800)  SpO2: (!) 92 % (02/19/25 0830) Vital Signs (24h Range):  Temp:  [97.2 °F (36.2 °C)-98.2 °F (36.8 °C)] 97.2 °F  (36.2 °C)  Pulse:  [] 104  Resp:  [14-28] 24  SpO2:  [92 %-100 %] 92 %  BP: (100-150)/(61-84) 111/62     Weight: 58 kg (127 lb 13.9 oz)  Body mass index is 19.44 kg/m².      Intake/Output Summary (Last 24 hours) at 2/19/2025 0836  Last data filed at 2/19/2025 0527  Gross per 24 hour   Intake --   Output 915 ml   Net -915 ml        Physical Exam  Constitutional:       General: He is not in acute distress.     Appearance: He is cachectic. He is ill-appearing.   HENT:      Head: Normocephalic and atraumatic.      Nose: Nose normal.      Mouth/Throat:      Mouth: Mucous membranes are moist.   Eyes:      Pupils: Pupils are equal, round, and reactive to light.   Cardiovascular:      Rate and Rhythm: Normal rate and regular rhythm.      Pulses: Normal pulses.      Heart sounds: Normal heart sounds.   Pulmonary:      Effort: Pulmonary effort is normal.      Breath sounds: Rhonchi and rales present.      Comments: R chest tube in place  Abdominal:      General: Abdomen is flat. There is no distension.      Palpations: Abdomen is soft.      Tenderness: There is no abdominal tenderness.   Musculoskeletal:         General: No swelling.   Skin:     General: Skin is warm and dry.   Neurological:      General: No focal deficit present.      Mental Status: He is alert and oriented to person, place, and time. Mental status is at baseline.      Motor: No weakness.           Review of Systems   Respiratory:  Positive for cough and shortness of breath. Negative for chest tightness, wheezing and stridor.    Cardiovascular:  Negative for chest pain, palpitations and leg swelling.   Gastrointestinal:  Negative for abdominal pain, constipation, diarrhea and nausea.     O2: Room air    Lines/Drains/Airways       Drain  Duration                  Chest Tube 02/18/25 2111 Tube - 1 Right <1 day              Peripheral Intravenous Line  Duration                  Peripheral IV - Single Lumen 02/18/25 1650 18 G Anterior;Right Forearm <1 day  "                   Significant Labs:    CBC/Anemia Profile:  Recent Labs   Lab 02/18/25  1651 02/19/25  0624   WBC 10.83 8.57   HGB 12.1* 13.6*   HCT 34.7* 41.5   * 412   MCV 89 93   RDW 13.2 12.9        Chemistries:  Recent Labs   Lab 02/18/25  1751 02/19/25  0624    135*   K 4.2 4.5   CL 99 102   CO2 28 21*   BUN 18 12   CREATININE 0.8 0.8   CALCIUM 9.2 9.3   ALBUMIN 2.9*  --    PROT 8.5*  --    BILITOT 0.2  --    ALKPHOS 61  --    ALT 15  --    AST 22  --    MG  --  1.7       All pertinent labs within the past 24 hours have been reviewed.    Significant Imaging:  I have reviewed all pertinent imaging results/findings within the past 24 hours.    ABG  No results for input(s): "PH", "PO2", "PCO2", "HCO3", "BE" in the last 168 hours.  Assessment/Plan:     Psychiatric  Anxiety  Continue home dose prn xanax    Pulmonary  * Spontaneous pneumothorax  Bilateral, right > left  - maintain right CT to atrium suction  - daily CXR monitoring  - consult CTS to evaluate for possible VATS, biopsy- pending  - On Room air currently, stable to SD to hosp med    Other  Abnormal CT of the chest  Concerning for interstitial lung disease  Anticipate VATS need for pneumothoraces and likely benefit from lung biopsy  Full eval by pulmonologist and CTS    Tobacco smoker, 1 pack of cigarettes or less per day  -provide cessation education and evaluate readiness to stop  -add NRT if desired      Level III     Critical care was time spent personally by me on the following activities: development of treatment plan with patient or surrogate and bedside caregivers, discussions with consultants, evaluation of patient's response to treatment, examination of patient, ordering and performing treatments and interventions, ordering and review of laboratory studies, ordering and review of radiographic studies, pulse oximetry, re-evaluation of patient's condition. This critical care time did not overlap with that of any other provider or " involve time for any procedures.     Eva Christianson NP  Critical Care Medicine  [facility] - Intensive Care ([facility])

## 2025-02-20 LAB
ANION GAP SERPL CALC-SCNC: 10 MMOL/L (ref 8–16)
BASOPHILS # BLD AUTO: 0.08 K/UL (ref 0–0.2)
BASOPHILS NFR BLD: 0.9 % (ref 0–1.9)
BUN SERPL-MCNC: 15 MG/DL (ref 6–20)
CALCIUM SERPL-MCNC: 9.6 MG/DL (ref 8.7–10.5)
CHLORIDE SERPL-SCNC: 100 MMOL/L (ref 95–110)
CO2 SERPL-SCNC: 24 MMOL/L (ref 23–29)
CREAT SERPL-MCNC: 0.7 MG/DL (ref 0.5–1.4)
DIFFERENTIAL METHOD BLD: ABNORMAL
EOSINOPHIL # BLD AUTO: 0.9 K/UL (ref 0–0.5)
EOSINOPHIL NFR BLD: 9.7 % (ref 0–8)
ERYTHROCYTE [DISTWIDTH] IN BLOOD BY AUTOMATED COUNT: 12.8 % (ref 11.5–14.5)
EST. GFR  (NO RACE VARIABLE): >60 ML/MIN/1.73 M^2
GLUCOSE SERPL-MCNC: 110 MG/DL (ref 70–110)
HCT VFR BLD AUTO: 39.4 % (ref 40–54)
HGB BLD-MCNC: 13.1 G/DL (ref 14–18)
IMM GRANULOCYTES # BLD AUTO: 0.05 K/UL (ref 0–0.04)
IMM GRANULOCYTES NFR BLD AUTO: 0.5 % (ref 0–0.5)
LYMPHOCYTES # BLD AUTO: 1.1 K/UL (ref 1–4.8)
LYMPHOCYTES NFR BLD: 12.4 % (ref 18–48)
MAGNESIUM SERPL-MCNC: 1.9 MG/DL (ref 1.6–2.6)
MCH RBC QN AUTO: 30.3 PG (ref 27–31)
MCHC RBC AUTO-ENTMCNC: 33.2 G/DL (ref 32–36)
MCV RBC AUTO: 91 FL (ref 82–98)
MONOCYTES # BLD AUTO: 1.3 K/UL (ref 0.3–1)
MONOCYTES NFR BLD: 14 % (ref 4–15)
NEUTROPHILS # BLD AUTO: 5.7 K/UL (ref 1.8–7.7)
NEUTROPHILS NFR BLD: 62.5 % (ref 38–73)
NRBC BLD-RTO: 0 /100 WBC
PLATELET # BLD AUTO: 464 K/UL (ref 150–450)
PMV BLD AUTO: 8.6 FL (ref 9.2–12.9)
POTASSIUM SERPL-SCNC: 4.2 MMOL/L (ref 3.5–5.1)
RBC # BLD AUTO: 4.33 M/UL (ref 4.6–6.2)
SODIUM SERPL-SCNC: 134 MMOL/L (ref 136–145)
WBC # BLD AUTO: 9.13 K/UL (ref 3.9–12.7)

## 2025-02-20 PROCEDURE — 25000003 PHARM REV CODE 250: Performed by: NURSE PRACTITIONER

## 2025-02-20 PROCEDURE — 21400001 HC TELEMETRY ROOM

## 2025-02-20 PROCEDURE — 80048 BASIC METABOLIC PNL TOTAL CA: CPT | Performed by: NURSE PRACTITIONER

## 2025-02-20 PROCEDURE — 25000003 PHARM REV CODE 250: Performed by: STUDENT IN AN ORGANIZED HEALTH CARE EDUCATION/TRAINING PROGRAM

## 2025-02-20 PROCEDURE — 63600175 PHARM REV CODE 636 W HCPCS: Performed by: NURSE PRACTITIONER

## 2025-02-20 PROCEDURE — 85025 COMPLETE CBC W/AUTO DIFF WBC: CPT | Performed by: NURSE PRACTITIONER

## 2025-02-20 PROCEDURE — 27000221 HC OXYGEN, UP TO 24 HOURS

## 2025-02-20 PROCEDURE — 25000003 PHARM REV CODE 250: Performed by: SPECIALIST

## 2025-02-20 PROCEDURE — 36415 COLL VENOUS BLD VENIPUNCTURE: CPT | Performed by: NURSE PRACTITIONER

## 2025-02-20 PROCEDURE — 99900035 HC TECH TIME PER 15 MIN (STAT)

## 2025-02-20 PROCEDURE — 94761 N-INVAS EAR/PLS OXIMETRY MLT: CPT

## 2025-02-20 PROCEDURE — 83735 ASSAY OF MAGNESIUM: CPT | Performed by: NURSE PRACTITIONER

## 2025-02-20 RX ORDER — MORPHINE SULFATE 2 MG/ML
2 INJECTION, SOLUTION INTRAMUSCULAR; INTRAVENOUS ONCE
Status: DISCONTINUED | OUTPATIENT
Start: 2025-02-20 | End: 2025-02-24

## 2025-02-20 RX ORDER — SODIUM CHLORIDE 0.9 % (FLUSH) 0.9 %
10 SYRINGE (ML) INJECTION
Status: DISCONTINUED | OUTPATIENT
Start: 2025-02-20 | End: 2025-02-25 | Stop reason: HOSPADM

## 2025-02-20 RX ORDER — AMOXICILLIN 250 MG
1 CAPSULE ORAL DAILY PRN
Status: DISCONTINUED | OUTPATIENT
Start: 2025-02-20 | End: 2025-02-25 | Stop reason: HOSPADM

## 2025-02-20 RX ORDER — HYDROCODONE BITARTRATE AND ACETAMINOPHEN 7.5; 325 MG/1; MG/1
1 TABLET ORAL EVERY 4 HOURS PRN
Refills: 0 | Status: DISCONTINUED | OUTPATIENT
Start: 2025-02-20 | End: 2025-02-21

## 2025-02-20 RX ORDER — SODIUM CHLORIDE 0.9 % (FLUSH) 0.9 %
10 SYRINGE (ML) INJECTION
Status: DISCONTINUED | OUTPATIENT
Start: 2025-02-20 | End: 2025-02-24

## 2025-02-20 RX ADMIN — HYDROCODONE BITARTRATE AND ACETAMINOPHEN 1 TABLET: 7.5; 325 TABLET ORAL at 09:02

## 2025-02-20 RX ADMIN — HYDROCODONE BITARTRATE AND ACETAMINOPHEN 1 TABLET: 5; 325 TABLET ORAL at 04:02

## 2025-02-20 RX ADMIN — ENOXAPARIN SODIUM 40 MG: 40 INJECTION SUBCUTANEOUS at 04:02

## 2025-02-20 RX ADMIN — GUAIFENESIN AND DEXTROMETHORPHAN HYDROBROMIDE 1 TABLET: 600; 30 TABLET, EXTENDED RELEASE ORAL at 09:02

## 2025-02-20 RX ADMIN — FAMOTIDINE 20 MG: 20 TABLET ORAL at 09:02

## 2025-02-20 RX ADMIN — MUPIROCIN: 20 OINTMENT TOPICAL at 08:02

## 2025-02-20 RX ADMIN — MUPIROCIN: 20 OINTMENT TOPICAL at 09:02

## 2025-02-20 RX ADMIN — HYDROCODONE BITARTRATE AND ACETAMINOPHEN 1 TABLET: 5; 325 TABLET ORAL at 08:02

## 2025-02-20 RX ADMIN — SENNOSIDES AND DOCUSATE SODIUM 1 TABLET: 50; 8.6 TABLET ORAL at 05:02

## 2025-02-20 RX ADMIN — HYDROCODONE BITARTRATE AND ACETAMINOPHEN 1 TABLET: 5; 325 TABLET ORAL at 05:02

## 2025-02-20 RX ADMIN — GUAIFENESIN AND DEXTROMETHORPHAN HYDROBROMIDE 1 TABLET: 600; 30 TABLET, EXTENDED RELEASE ORAL at 08:02

## 2025-02-20 NOTE — PLAN OF CARE
A232/A232 SOMMER Pang is a 59 y.o.male admitted on 2/18/2025 for Spontaneous pneumothorax   Code Status: Full Code MRN: [id number]   Review of patient's allergies indicates:   Allergen Reactions    Ancef [cefazolin] Other (See Comments)     Headaches and dizziness     Past Medical History:   Diagnosis Date    ADD (attention deficit disorder)     Anxiety     Back pain     Depression     Gout     Neuromuscular disorder       PRN meds    acetaminophen, 650 mg, Q4H PRN  albuterol-ipratropium, 3 mL, Q4H PRN  ALPRAZolam, 0.25 mg, BID PRN  HYDROcodone-acetaminophen, 1 tablet, Q4H PRN  influenza, 0.5 mL, vaccine x 1 dose  ondansetron, 8 mg, Q8H PRN  pneumoc 20-tres conj-dip cr(PF), 0.5 mL, vaccine x 1 dose  sodium chloride 0.9%, 10 mL, PRN      Chart check completed. Will continue plan of care.      Orientation: oriented x 4  Juanita Coma Scale Score: 15     Lead Monitored: Lead II Rhythm: sinus tachycardia    Cardiac/Telemetry Box Number: [id number]  VTE Core Measure: Pharmacological prophylaxis initiated/maintained Last Bowel Movement: 02/18/25  Diet Adult Regular Standard Tray     Mundo Score: 20  Fall Risk Score: 12  Accucheck []   Freq?      Lines/Drains/Airways       Drain  Duration                  Chest Tube 02/18/25 2111 Tube - 1 Right 1 day              Peripheral Intravenous Line  Duration                  Peripheral IV - Single Lumen 02/18/25 1650 18 G Anterior;Right Forearm 1 day

## 2025-02-20 NOTE — PROGRESS NOTES
[facility], [facility] ([facility] Medicine  Progress Note    Patient Name: Octavio Pang  MRN: [id number]  Patient Class: IP- Inpatient   Admission Date: 2/18/2025  Length of Stay: 2 days  Attending Physician: Shelli Waterman,*  Primary Care Provider: Herman Quan MD        Subjective     Principal Problem:Spontaneous pneumothorax        HPI:  59-year-old male with a known past medical history which includes ADHD, anxiety, depression, back pain, and tobacco abuse (reports less than 1 ppd) since age of 18 that presented to his PCP for evaluation of worsening shortness of breath over the past couple of months.  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  Patient underwent chest x-ray and he reports a few hours later he was called and told to go to the ER as he had bilateral pneumothoraces and concern for pneumonia on his chest x-ray.  At the time of arrival in the ER, patient underwent another x-ray which did reveal right greater than left bilateral pneumothoraces and a small bore chest tube was placed on the right.  Follow up x-rays with tiny left apical pneumothorax was still a moderate size right pneumothorax.  Patient was admitted to the ICU for close observation.  Patient remains on room air with adequate O2 sats.  He has no other complaints.  He is deemed stable for transfer out of the ICU to the floor in the morning of 2/19 for which Hospital Medicine was consulted to assume care.  CTS has been consulted for further evaluation and management of right pneumothorax.     At the time of my exam in the ICU, the pt is awake and alert on RA in NAD. He quite thin. He is able to provide history and answer questions. Right sided pigtail chest tube in place connected to suction, but float not activated -- have discussed with nursing to  ensure he is at -20 cm suction. Pt awaiting transfer to the floor.    Overview/Hospital Course:  2/19: transferred from the ICU to the floor with R CT in place to suction, on RA in NAD, CTS consulted, no new issues or c/o noted   2/20-- CXR showed Reduced right pneumothorax.  CTS planning for possible right VATS and chemical pleurodesis and any other indicated procedure on 02/21/2025.  Patient stated improvement in cough, denied acute events, resting comfortably, hemodynamically stable.            Review of Systems      Constitutional:  Negative for fever.   HENT:  Negative for congestion.    Respiratory:  Positive for cough (intermittent). Negative for shortness of breath.    Cardiovascular:  Negative for leg swelling.   Gastrointestinal:  Negative for abdominal distention.      Objective:     Vital Signs (Most Recent):  Temp: 98.2 °F (36.8 °C) (02/20/25 0845)  Pulse: 97 (02/20/25 0920)  Resp: 20 (02/20/25 0845)  BP: 115/69 (02/20/25 0845)  SpO2: 96 % (02/20/25 0845) Vital Signs (24h Range):  Temp:  [97.8 °F (36.6 °C)-98.2 °F (36.8 °C)] 98.2 °F (36.8 °C)  Pulse:  [] 97  Resp:  [18-30] 20  SpO2:  [93 %-99 %] 96 %  BP: ()/(63-73) 115/69     Weight: 58 kg (127 lb 13.9 oz)  Body mass index is 19.44 kg/m².    Intake/Output Summary (Last 24 hours) at 2/20/2025 1255  Last data filed at 2/20/2025 0847  Gross per 24 hour   Intake 240 ml   Output 1285 ml   Net -1045 ml         Physical Exam    Constitutional:       General: He is awake.      Appearance: He is underweight.   HENT:      Mouth/Throat:      Dentition: Abnormal dentition. Dental caries present.   Cardiovascular:      Rate and Rhythm: Normal rate.      Pulses:           Radial pulses are 2+ on the right side and 2+ on the left side.   Pulmonary:      Effort: Pulmonary effort is normal.      Breath sounds: Decreased breath sounds present.      Comments: R pigtail CT in place to suction, on RA  Abdominal:      General: Abdomen is flat. There is no  distension.      Palpations: Abdomen is soft.   Musculoskeletal:      Comments: EPSTEIN   Skin:     General: Skin is warm and dry.   Neurological:      General: No focal deficit present.      Mental Status: He is alert.   Psychiatric:         Attention and Perception: Attention normal.         Behavior: Behavior is cooperative.      Comments: calm        Significant Labs: All pertinent labs within the past 24 hours have been reviewed.  CBC:   Recent Labs   Lab 02/18/25  1651 02/19/25  0624 02/20/25  0430   WBC 10.83 8.57 9.13   HGB 12.1* 13.6* 13.1*   HCT 34.7* 41.5 39.4*   * 412 464*     CMP:   Recent Labs   Lab 02/18/25  1751 02/19/25  0624 02/20/25  0430    135* 134*   K 4.2 4.5 4.2   CL 99 102 100   CO2 28 21* 24   GLU 96 82 110   BUN 18 12 15   CREATININE 0.8 0.8 0.7   CALCIUM 9.2 9.3 9.6   PROT 8.5*  --   --    ALBUMIN 2.9*  --   --    BILITOT 0.2  --   --    ALKPHOS 61  --   --    AST 22  --   --    ALT 15  --   --    ANIONGAP 9 12 10       Significant Imaging:     Imaging Results              CT Chest With Contrast (Final result)  Result time 02/18/25 19:22:32      Final result by Blanco Page MD (02/18/25 19:22:32)                   Impression:      As above    All CT scans   are performed using dose optimization techniques including the following: automated exposure control; adjustment of the mA and/or kV; use of iterative reconstruction technique.  Dose modulation was employed for ALARA by means of: Automated exposure control; adjustment of the mA and/or kV according to patient size (this includes techniques or standardized protocols for targeted exams where dose is matched to indication/reason for exam; i.e. extremities or head); and/or use of iterative reconstructive technique.      Electronically signed by: Blanco Page  Date:    02/18/2025  Time:    19:22               Narrative:    EXAMINATION:  CT CHEST WITH CONTRAST    CLINICAL HISTORY:  Soft tissue mass, chest, US/xray  nondiagnostic;    TECHNIQUE:  Axial images through the chest were obtained after the IV administration of 75 mL Omnipaque 350. Coronal and sagittal images obtained.    COMPARISON:  No prior chest CT available    FINDINGS:  Tiny left apical pneumothorax.  Mild moderate right-sided pneumothorax.  Mild moderate subpleural reticular and subpleural opacity in the right greater than left lung.  Anterior right pleural drainage catheter is identified.  Heart is not enlarged.  No aneurysm.  No pulmonary embolism.  Mild right sided pleural effusion.  Slight left-sided pleural effusion.  Coronary artery calcification.  Mediastinal lymph nodes behind the left atrium suspected.  Correlate clinically for pneumonia.                                       X-Ray Chest AP Portable (Final result)  Result time 02/18/25 18:21:22      Final result by Blanco Page MD (02/18/25 18:21:22)                   Impression:      As above      Electronically signed by: Blanco Page  Date:    02/18/2025  Time:    18:21               Narrative:    EXAMINATION:  XR CHEST AP PORTABLE    CLINICAL HISTORY:  Presence of other specified functional implants    TECHNIQUE:  Single frontal view of the chest was performed.    COMPARISON:  None    FINDINGS:  Redemonstration of right-sided pneumothorax, questionably slightly smaller.  Coarse interstitial and alveolar markings in the bilateral lungs.  Trace left apical pneumothorax.  Otherwise similar findings to prior exam.  Cervical hardware.                                       X-Ray Chest AP Portable (Final result)  Result time 02/18/25 16:40:00      Final result by Blanco Page MD (02/18/25 16:40:00)                   Impression:      Stable exam      Electronically signed by: Blanco Page  Date:    02/18/2025  Time:    16:40               Narrative:    EXAMINATION:  XR CHEST AP PORTABLE    CLINICAL HISTORY:  Pneumothorax;    TECHNIQUE:  Single frontal view of the chest was  performed.    COMPARISON:  Prior dated 02/18/2025 at 09:50    FINDINGS:  Right-sided pneumothorax unchanged since the prior exam.  Coarse interstitial alveolar remain.  Cervical hardware.    Bones are intact.                                       Assessment and Plan     * Spontaneous pneumothorax  -spontaneous bilateral pneumothoraces present on admit, right-greater-than-left  -continue right pigtail chest tube to -20 cm of suction   -CTS consulted further evaluation and recommendations  -patient on room air, goal O2 sat greater than 92%  -daily chest x-rays  -? underlying ILD v other given environmental exposures  -would benefit from establishing with pulm as an outpt as he has underlying lung disease and reports has no evaluation of it     Abnormal CT of the chest  - see plan for ptx      Anxiety  - PRN xanax  - supportive care      Tobacco smoker, 1 pack of cigarettes or less per day  - cessation education during admission, cessation provided this AM for apprx 10 minutes   - refusing nicotine patch currently, will add if pt would like as some point         VTE Risk Mitigation (From admission, onward)           Ordered     enoxaparin injection 40 mg  Daily         02/18/25 2115                    Discharge Planning   GUILLE:      Code Status: Full Code   Medical Readiness for Discharge Date:   Discharge Plan A: Home Health                        [facility] Vadim Waterman MD  Department of Hospital Medicine   [facility] - Telemetry ([facility])

## 2025-02-20 NOTE — SUBJECTIVE & OBJECTIVE
Review of Systems      Constitutional:  Negative for fever.   HENT:  Negative for congestion.    Respiratory:  Positive for cough (intermittent). Negative for shortness of breath.    Cardiovascular:  Negative for leg swelling.   Gastrointestinal:  Negative for abdominal distention.      Objective:     Vital Signs (Most Recent):  Temp: 98.2 °F (36.8 °C) (02/20/25 0845)  Pulse: 97 (02/20/25 0920)  Resp: 20 (02/20/25 0845)  BP: 115/69 (02/20/25 0845)  SpO2: 96 % (02/20/25 0845) Vital Signs (24h Range):  Temp:  [97.8 °F (36.6 °C)-98.2 °F (36.8 °C)] 98.2 °F (36.8 °C)  Pulse:  [] 97  Resp:  [18-30] 20  SpO2:  [93 %-99 %] 96 %  BP: ()/(63-73) 115/69     Weight: 58 kg (127 lb 13.9 oz)  Body mass index is 19.44 kg/m².    Intake/Output Summary (Last 24 hours) at 2/20/2025 1255  Last data filed at 2/20/2025 0847  Gross per 24 hour   Intake 240 ml   Output 1285 ml   Net -1045 ml         Physical Exam    Constitutional:       General: He is awake.      Appearance: He is underweight.   HENT:      Mouth/Throat:      Dentition: Abnormal dentition. Dental caries present.   Cardiovascular:      Rate and Rhythm: Normal rate.      Pulses:           Radial pulses are 2+ on the right side and 2+ on the left side.   Pulmonary:      Effort: Pulmonary effort is normal.      Breath sounds: Decreased breath sounds present.      Comments: R pigtail CT in place to suction, on RA  Abdominal:      General: Abdomen is flat. There is no distension.      Palpations: Abdomen is soft.   Musculoskeletal:      Comments: EPSTEIN   Skin:     General: Skin is warm and dry.   Neurological:      General: No focal deficit present.      Mental Status: He is alert.   Psychiatric:         Attention and Perception: Attention normal.         Behavior: Behavior is cooperative.      Comments: calm        Significant Labs: All pertinent labs within the past 24 hours have been reviewed.  CBC:   Recent Labs   Lab 02/18/25  1651 02/19/25  0624 02/20/25  0430    WBC 10.83 8.57 9.13   HGB 12.1* 13.6* 13.1*   HCT 34.7* 41.5 39.4*   * 412 464*     CMP:   Recent Labs   Lab 02/18/25  1751 02/19/25  0624 02/20/25  0430    135* 134*   K 4.2 4.5 4.2   CL 99 102 100   CO2 28 21* 24   GLU 96 82 110   BUN 18 12 15   CREATININE 0.8 0.8 0.7   CALCIUM 9.2 9.3 9.6   PROT 8.5*  --   --    ALBUMIN 2.9*  --   --    BILITOT 0.2  --   --    ALKPHOS 61  --   --    AST 22  --   --    ALT 15  --   --    ANIONGAP 9 12 10       Significant Imaging:     Imaging Results              CT Chest With Contrast (Final result)  Result time 02/18/25 19:22:32      Final result by Blanco Page MD (02/18/25 19:22:32)                   Impression:      As above    All CT scans   are performed using dose optimization techniques including the following: automated exposure control; adjustment of the mA and/or kV; use of iterative reconstruction technique.  Dose modulation was employed for ALARA by means of: Automated exposure control; adjustment of the mA and/or kV according to patient size (this includes techniques or standardized protocols for targeted exams where dose is matched to indication/reason for exam; i.e. extremities or head); and/or use of iterative reconstructive technique.      Electronically signed by: Blanco Page  Date:    02/18/2025  Time:    19:22               Narrative:    EXAMINATION:  CT CHEST WITH CONTRAST    CLINICAL HISTORY:  Soft tissue mass, chest, US/xray nondiagnostic;    TECHNIQUE:  Axial images through the chest were obtained after the IV administration of 75 mL Omnipaque 350. Coronal and sagittal images obtained.    COMPARISON:  No prior chest CT available    FINDINGS:  Tiny left apical pneumothorax.  Mild moderate right-sided pneumothorax.  Mild moderate subpleural reticular and subpleural opacity in the right greater than left lung.  Anterior right pleural drainage catheter is identified.  Heart is not enlarged.  No aneurysm.  No pulmonary embolism.  Mild right  sided pleural effusion.  Slight left-sided pleural effusion.  Coronary artery calcification.  Mediastinal lymph nodes behind the left atrium suspected.  Correlate clinically for pneumonia.                                       X-Ray Chest AP Portable (Final result)  Result time 02/18/25 18:21:22      Final result by Blanco Page MD (02/18/25 18:21:22)                   Impression:      As above      Electronically signed by: Blanco Page  Date:    02/18/2025  Time:    18:21               Narrative:    EXAMINATION:  XR CHEST AP PORTABLE    CLINICAL HISTORY:  Presence of other specified functional implants    TECHNIQUE:  Single frontal view of the chest was performed.    COMPARISON:  None    FINDINGS:  Redemonstration of right-sided pneumothorax, questionably slightly smaller.  Coarse interstitial and alveolar markings in the bilateral lungs.  Trace left apical pneumothorax.  Otherwise similar findings to prior exam.  Cervical hardware.                                       X-Ray Chest AP Portable (Final result)  Result time 02/18/25 16:40:00      Final result by Blanco Page MD (02/18/25 16:40:00)                   Impression:      Stable exam      Electronically signed by: Blanco Page  Date:    02/18/2025  Time:    16:40               Narrative:    EXAMINATION:  XR CHEST AP PORTABLE    CLINICAL HISTORY:  Pneumothorax;    TECHNIQUE:  Single frontal view of the chest was performed.    COMPARISON:  Prior dated 02/18/2025 at 09:50    FINDINGS:  Right-sided pneumothorax unchanged since the prior exam.  Coarse interstitial alveolar remain.  Cervical hardware.    Bones are intact.

## 2025-02-20 NOTE — CONSULTS
[facility] - Intensive Care ([facility])  Cardiothoracic Surgery  Consult Note    Patient Name: Octavio Pang  MRN: [id number]  Admission Date: 2/18/2025  Attending Physician: Sehlli Waterman,*  Referring Provider: Self, Aaareferral    Patient information was obtained from patient, ER records, and primary team.     Consults  Subjective:     Chief Complaint/Reason for Admission:  Right-sided spontaneous pneumothorax    History of Present Illness:  This 59-year-old male with a  smoker for lifelong has not seen a pulmonologist was seen in the [facility] ER for a spontaneous pneumothorax on the right side was transferred for further management.  The patient had a cook catheter placed in the right chest and was connected to Pleur-evac.  The patient does not have any significant cardiorespiratory history.    No current facility-administered medications on file prior to encounter.     Current Outpatient Medications on File Prior to Encounter   Medication Sig    allopurinol (ZYLOPRIM) 100 MG tablet Take 100 mg by mouth 3 (three) times daily.    alprazolam (XANAX) 1 MG tablet Take 2 mg by mouth nightly as needed for Anxiety.     clonazePAM (KLONOPIN) 1 MG tablet Take 1 mg by mouth 2 (two) times daily as needed for Anxiety.    gabapentin (NEURONTIN) 100 MG capsule Take 100 mg by mouth as needed.    ibuprofen (ADVIL,MOTRIN) 200 MG tablet Take 200 mg by mouth.    indomethacin (INDOCIN) 25 MG capsule 2 caps 3 times a day with food until gout is better then 1 twice a day for 5 more days    lisdexamfetamine (VYVANSE) 30 MG capsule Take 30 mg by mouth every morning.    methocarbamol (ROBAXIN) 500 MG Tab Take 500 mg by mouth.    methylPREDNISolone (MEDROL DOSEPACK) 4 mg tablet As per package label    naproxen (NAPROSYN) 500 MG tablet Take 1 tablet (500 mg total) by mouth 2 (two) times daily with meals.       Review of patient's allergies indicates:   Allergen Reactions    Ancef [cefazolin] Other (See  Comments)     Headaches and dizziness       Past Medical History:   Diagnosis Date    ADD (attention deficit disorder)     Anxiety     Back pain     Depression     Gout     Neuromuscular disorder      Past Surgical History:   Procedure Laterality Date    BACK SURGERY  10/13/2016    NECK SURGERY       Family History       Problem Relation (Age of Onset)    Arthritis Father    Pulmonary fibrosis Mother          Tobacco Use    Smoking status: Some Days     Current packs/day: 1.00     Types: Cigarettes    Smokeless tobacco: Never   Substance and Sexual Activity    Alcohol use: Yes     Comment: occassionally     Drug use: No    Sexual activity: Not on file     Review of Systems   Constitutional:  Negative for activity change and appetite change.   HENT:  Negative for dental problem, nosebleeds and sore throat.    Eyes:  Negative for discharge and visual disturbance.   Respiratory:  Positive for cough, shortness of breath and wheezing. Negative for chest tightness and stridor.    Cardiovascular:  Negative for leg swelling.   Gastrointestinal:  Negative for abdominal distention and abdominal pain.   Genitourinary:  Negative for difficulty urinating and dysuria.   Musculoskeletal:  Negative for arthralgias, back pain and joint swelling.   Allergic/Immunologic: Negative for environmental allergies.   Neurological:  Negative for dizziness, syncope and headaches.   Hematological:  Does not bruise/bleed easily.   Psychiatric/Behavioral:  Negative for behavioral problems.      Objective:     Vital Signs (Most Recent):  Temp: 98.2 °F (36.8 °C) (02/20/25 0845)  Pulse: 97 (02/20/25 0920)  Resp: 20 (02/20/25 0845)  BP: 115/69 (02/20/25 0845)  SpO2: 96 % (02/20/25 0845) Vital Signs (24h Range):  Temp:  [97.8 °F (36.6 °C)-98.2 °F (36.8 °C)] 98.2 °F (36.8 °C)  Pulse:  [] 97  Resp:  [18-30] 20  SpO2:  [92 %-99 %] 96 %  BP: ()/(62-73) 115/69     Weight: 58 kg (127 lb 13.9 oz)  Body mass index is 19.44 kg/m².    SpO2: 96 %         Intake/Output - Last 3 Shifts         02/18 0700  02/19 0659 02/19 0700  02/20 0659 02/20 0700  02/21 0659    P.O.   240    I.V. (mL/kg)  45 (0.8)     Total Intake(mL/kg)  45 (0.8) 240 (4.1)    Urine (mL/kg/hr) 850 1200 (0.9) 350 (1.3)    Chest Tube 65 55     Total Output 915 1255 350    Net -915 -1210 -110                    Lines/Drains/Airways       Drain  Duration                  Chest Tube 02/18/25 2111 Tube - 1 Right 1 day              Peripheral Intravenous Line  Duration                  Peripheral IV - Single Lumen 02/18/25 1650 18 G Anterior;Right Forearm 1 day                     STS Risk Score:     Physical Exam  Vitals reviewed.   Constitutional:       Appearance: Normal appearance.   HENT:      Head: Normocephalic and atraumatic.      Mouth/Throat:      Mouth: Mucous membranes are moist.   Eyes:      Extraocular Movements: Extraocular movements intact.   Cardiovascular:      Rate and Rhythm: Normal rate and regular rhythm.      Pulses: Normal pulses.      Heart sounds: Normal heart sounds.   Pulmonary:      Effort: Pulmonary effort is normal.      Breath sounds: Rhonchi and rales present.      Comments: Right-sided cook catheter tend Pleur-Evac the Pleur-Evac is tightening and intermittent air leak.  Abdominal:      Palpations: Abdomen is soft.   Musculoskeletal:         General: Normal range of motion.      Cervical back: Normal range of motion and neck supple.   Skin:     General: Skin is warm.      Capillary Refill: Capillary refill takes less than 2 seconds.   Neurological:      General: No focal deficit present.      Mental Status: He is alert and oriented to person, place, and time.   Psychiatric:         Mood and Affect: Mood normal.         Significant Labs:  BMP:   Recent Labs   Lab 02/20/25  0430      *   K 4.2      CO2 24   BUN 15   CREATININE 0.7   CALCIUM 9.6   MG 1.9     CBC:   Recent Labs   Lab 02/20/25  0430   WBC 9.13   RBC 4.33*   HGB 13.1*   HCT 39.4*   PLT  464*   MCV 91   MCH 30.3   MCHC 33.2       Significant Diagnostics:  CT: I have reviewed all pertinent results/findings within the past 24 hours  CXR: I have reviewed all pertinent results/findings within the past 24 hours    Assessment/Plan:   59-year-old male smoker for life  with pan acinar emphysema severe with very low lung volume which is poor quality with a spontaneous pneumothorax and a 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  On reviewing the chest x-ray from today patient showed expanded lungs with a improvement of the pneumothorax.  This gives him a better chance of having a chemical pleurodesis which will protect him from having future pneumothoraces due to rupture of his extensive bullous disease.  I have discussed this option with the patient and we proceed with a right VATS and chemical pleurodesis and any other indicated procedure on 02/21/2025.  He is still have a chance of having a prolonged air leak which will need a Heimlich valve to go home with and followed up as an outpatient with me.    NYHA Score: NYHA II: slight limitation of physical activity, comfortable at rest    Active Diagnoses:    Diagnosis Date Noted POA    PRINCIPAL PROBLEM:  Spontaneous pneumothorax [J93.83] 02/18/2025 Yes    Tobacco smoker, 1 pack of cigarettes or less per day [F17.210] 02/18/2025 Yes    Anxiety [F41.9] 02/18/2025 Yes    Abnormal CT of the chest [R93.89] 02/18/2025 Yes      Problems Resolved During this Admission:       Thank you for your consult. I will follow-up with patient. Please contact us if you have any additional questions.    Tim Moss MD  Cardiothoracic Surgery  [facility] - Intensive Care ([facility])

## 2025-02-21 ENCOUNTER — ANESTHESIA EVENT (OUTPATIENT)
Dept: SURGERY | Facility: HOSPITAL | Age: 60
End: 2025-02-21
Payer: MEDICARE

## 2025-02-21 ENCOUNTER — ANESTHESIA (OUTPATIENT)
Dept: SURGERY | Facility: HOSPITAL | Age: 60
End: 2025-02-21
Payer: MEDICARE

## 2025-02-21 LAB
ABO + RH BLD: NORMAL
ANION GAP SERPL CALC-SCNC: 10 MMOL/L (ref 8–16)
ANION GAP SERPL CALC-SCNC: 10 MMOL/L (ref 8–16)
BASOPHILS # BLD AUTO: 0.09 K/UL (ref 0–0.2)
BASOPHILS NFR BLD: 1.1 % (ref 0–1.9)
BLD GP AB SCN CELLS X3 SERPL QL: NORMAL
BUN SERPL-MCNC: 15 MG/DL (ref 6–20)
BUN SERPL-MCNC: 18 MG/DL (ref 6–20)
CALCIUM SERPL-MCNC: 9.1 MG/DL (ref 8.7–10.5)
CALCIUM SERPL-MCNC: 9.5 MG/DL (ref 8.7–10.5)
CHLORIDE SERPL-SCNC: 101 MMOL/L (ref 95–110)
CHLORIDE SERPL-SCNC: 98 MMOL/L (ref 95–110)
CO2 SERPL-SCNC: 24 MMOL/L (ref 23–29)
CO2 SERPL-SCNC: 25 MMOL/L (ref 23–29)
CREAT SERPL-MCNC: 0.7 MG/DL (ref 0.5–1.4)
CREAT SERPL-MCNC: 0.7 MG/DL (ref 0.5–1.4)
DIFFERENTIAL METHOD BLD: ABNORMAL
EOSINOPHIL # BLD AUTO: 1.2 K/UL (ref 0–0.5)
EOSINOPHIL NFR BLD: 14.4 % (ref 0–8)
ERYTHROCYTE [DISTWIDTH] IN BLOOD BY AUTOMATED COUNT: 12.9 % (ref 11.5–14.5)
EST. GFR  (NO RACE VARIABLE): >60 ML/MIN/1.73 M^2
EST. GFR  (NO RACE VARIABLE): >60 ML/MIN/1.73 M^2
GLUCOSE SERPL-MCNC: 129 MG/DL (ref 70–110)
GLUCOSE SERPL-MCNC: 95 MG/DL (ref 70–110)
HCT VFR BLD AUTO: 38.9 % (ref 40–54)
HGB BLD-MCNC: 12.8 G/DL (ref 14–18)
IMM GRANULOCYTES # BLD AUTO: 0.05 K/UL (ref 0–0.04)
IMM GRANULOCYTES NFR BLD AUTO: 0.6 % (ref 0–0.5)
LYMPHOCYTES # BLD AUTO: 1.4 K/UL (ref 1–4.8)
LYMPHOCYTES NFR BLD: 16.9 % (ref 18–48)
MAGNESIUM SERPL-MCNC: 1.7 MG/DL (ref 1.6–2.6)
MCH RBC QN AUTO: 30.4 PG (ref 27–31)
MCHC RBC AUTO-ENTMCNC: 32.9 G/DL (ref 32–36)
MCV RBC AUTO: 92 FL (ref 82–98)
MONOCYTES # BLD AUTO: 1.2 K/UL (ref 0.3–1)
MONOCYTES NFR BLD: 15.3 % (ref 4–15)
NEUTROPHILS # BLD AUTO: 4.1 K/UL (ref 1.8–7.7)
NEUTROPHILS NFR BLD: 51.7 % (ref 38–73)
NRBC BLD-RTO: 0 /100 WBC
PLATELET # BLD AUTO: 462 K/UL (ref 150–450)
PMV BLD AUTO: 8.8 FL (ref 9.2–12.9)
POTASSIUM SERPL-SCNC: 4.6 MMOL/L (ref 3.5–5.1)
POTASSIUM SERPL-SCNC: 4.6 MMOL/L (ref 3.5–5.1)
RBC # BLD AUTO: 4.21 M/UL (ref 4.6–6.2)
SODIUM SERPL-SCNC: 133 MMOL/L (ref 136–145)
SODIUM SERPL-SCNC: 135 MMOL/L (ref 136–145)
WBC # BLD AUTO: 7.97 K/UL (ref 3.9–12.7)

## 2025-02-21 PROCEDURE — 36000711: Performed by: THORACIC SURGERY (CARDIOTHORACIC VASCULAR SURGERY)

## 2025-02-21 PROCEDURE — 25000242 PHARM REV CODE 250 ALT 637 W/ HCPCS: Performed by: NURSE ANESTHETIST, CERTIFIED REGISTERED

## 2025-02-21 PROCEDURE — 25000003 PHARM REV CODE 250: Performed by: NURSE PRACTITIONER

## 2025-02-21 PROCEDURE — 86850 RBC ANTIBODY SCREEN: CPT | Performed by: STUDENT IN AN ORGANIZED HEALTH CARE EDUCATION/TRAINING PROGRAM

## 2025-02-21 PROCEDURE — 25000003 PHARM REV CODE 250: Performed by: THORACIC SURGERY (CARDIOTHORACIC VASCULAR SURGERY)

## 2025-02-21 PROCEDURE — 63600175 PHARM REV CODE 636 W HCPCS: Mod: JZ,TB | Performed by: STUDENT IN AN ORGANIZED HEALTH CARE EDUCATION/TRAINING PROGRAM

## 2025-02-21 PROCEDURE — C1894 INTRO/SHEATH, NON-LASER: HCPCS | Performed by: THORACIC SURGERY (CARDIOTHORACIC VASCULAR SURGERY)

## 2025-02-21 PROCEDURE — 36415 COLL VENOUS BLD VENIPUNCTURE: CPT | Performed by: NURSE PRACTITIONER

## 2025-02-21 PROCEDURE — 21400001 HC TELEMETRY ROOM

## 2025-02-21 PROCEDURE — 71000033 HC RECOVERY, INTIAL HOUR: Performed by: THORACIC SURGERY (CARDIOTHORACIC VASCULAR SURGERY)

## 2025-02-21 PROCEDURE — 36415 COLL VENOUS BLD VENIPUNCTURE: CPT | Performed by: STUDENT IN AN ORGANIZED HEALTH CARE EDUCATION/TRAINING PROGRAM

## 2025-02-21 PROCEDURE — 3E0T3BZ INTRODUCTION OF ANESTHETIC AGENT INTO PERIPHERAL NERVES AND PLEXI, PERCUTANEOUS APPROACH: ICD-10-PCS | Performed by: THORACIC SURGERY (CARDIOTHORACIC VASCULAR SURGERY)

## 2025-02-21 PROCEDURE — 27000221 HC OXYGEN, UP TO 24 HOURS

## 2025-02-21 PROCEDURE — 83735 ASSAY OF MAGNESIUM: CPT | Performed by: NURSE PRACTITIONER

## 2025-02-21 PROCEDURE — 63600175 PHARM REV CODE 636 W HCPCS: Performed by: NURSE ANESTHETIST, CERTIFIED REGISTERED

## 2025-02-21 PROCEDURE — 36415 COLL VENOUS BLD VENIPUNCTURE: CPT | Mod: XB | Performed by: THORACIC SURGERY (CARDIOTHORACIC VASCULAR SURGERY)

## 2025-02-21 PROCEDURE — 63600175 PHARM REV CODE 636 W HCPCS: Performed by: NURSE PRACTITIONER

## 2025-02-21 PROCEDURE — 0B5N4ZZ DESTRUCTION OF RIGHT PLEURA, PERCUTANEOUS ENDOSCOPIC APPROACH: ICD-10-PCS | Performed by: THORACIC SURGERY (CARDIOTHORACIC VASCULAR SURGERY)

## 2025-02-21 PROCEDURE — 85025 COMPLETE CBC W/AUTO DIFF WBC: CPT | Performed by: NURSE PRACTITIONER

## 2025-02-21 PROCEDURE — 36000710: Performed by: THORACIC SURGERY (CARDIOTHORACIC VASCULAR SURGERY)

## 2025-02-21 PROCEDURE — 80048 BASIC METABOLIC PNL TOTAL CA: CPT | Mod: 91 | Performed by: THORACIC SURGERY (CARDIOTHORACIC VASCULAR SURGERY)

## 2025-02-21 PROCEDURE — 37000009 HC ANESTHESIA EA ADD 15 MINS: Performed by: THORACIC SURGERY (CARDIOTHORACIC VASCULAR SURGERY)

## 2025-02-21 PROCEDURE — 25000003 PHARM REV CODE 250: Performed by: NURSE ANESTHETIST, CERTIFIED REGISTERED

## 2025-02-21 PROCEDURE — 94761 N-INVAS EAR/PLS OXIMETRY MLT: CPT

## 2025-02-21 PROCEDURE — 25000003 PHARM REV CODE 250: Performed by: STUDENT IN AN ORGANIZED HEALTH CARE EDUCATION/TRAINING PROGRAM

## 2025-02-21 PROCEDURE — C1729 CATH, DRAINAGE: HCPCS | Performed by: THORACIC SURGERY (CARDIOTHORACIC VASCULAR SURGERY)

## 2025-02-21 PROCEDURE — 80048 BASIC METABOLIC PNL TOTAL CA: CPT | Performed by: NURSE PRACTITIONER

## 2025-02-21 PROCEDURE — 37000008 HC ANESTHESIA 1ST 15 MINUTES: Performed by: THORACIC SURGERY (CARDIOTHORACIC VASCULAR SURGERY)

## 2025-02-21 PROCEDURE — 63600175 PHARM REV CODE 636 W HCPCS: Mod: JZ,TB | Performed by: THORACIC SURGERY (CARDIOTHORACIC VASCULAR SURGERY)

## 2025-02-21 PROCEDURE — A4216 STERILE WATER/SALINE, 10 ML: HCPCS | Performed by: THORACIC SURGERY (CARDIOTHORACIC VASCULAR SURGERY)

## 2025-02-21 PROCEDURE — 27201423 OPTIME MED/SURG SUP & DEVICES STERILE SUPPLY: Performed by: THORACIC SURGERY (CARDIOTHORACIC VASCULAR SURGERY)

## 2025-02-21 RX ORDER — MUPIROCIN 20 MG/G
1 OINTMENT TOPICAL 2 TIMES DAILY
Status: DISCONTINUED | OUTPATIENT
Start: 2025-02-21 | End: 2025-02-25 | Stop reason: HOSPADM

## 2025-02-21 RX ORDER — HYDROMORPHONE HYDROCHLORIDE 2 MG/ML
0.2 INJECTION, SOLUTION INTRAMUSCULAR; INTRAVENOUS; SUBCUTANEOUS EVERY 5 MIN PRN
Status: DISCONTINUED | OUTPATIENT
Start: 2025-02-21 | End: 2025-02-21 | Stop reason: HOSPADM

## 2025-02-21 RX ORDER — METOCLOPRAMIDE HYDROCHLORIDE 5 MG/ML
5 INJECTION INTRAMUSCULAR; INTRAVENOUS EVERY 6 HOURS PRN
Status: DISCONTINUED | OUTPATIENT
Start: 2025-02-21 | End: 2025-02-25 | Stop reason: HOSPADM

## 2025-02-21 RX ORDER — CEFAZOLIN SODIUM 1 G/3ML
2 INJECTION, POWDER, FOR SOLUTION INTRAMUSCULAR; INTRAVENOUS
Status: DISCONTINUED | OUTPATIENT
Start: 2025-02-21 | End: 2025-02-21 | Stop reason: SDUPTHER

## 2025-02-21 RX ORDER — FENTANYL CITRATE 50 UG/ML
INJECTION, SOLUTION INTRAMUSCULAR; INTRAVENOUS
Status: DISCONTINUED | OUTPATIENT
Start: 2025-02-21 | End: 2025-02-21

## 2025-02-21 RX ORDER — IPRATROPIUM BROMIDE 0.5 MG/2.5ML
0.5 SOLUTION RESPIRATORY (INHALATION) EVERY 4 HOURS
Status: DISCONTINUED | OUTPATIENT
Start: 2025-02-22 | End: 2025-02-23

## 2025-02-21 RX ORDER — BUPIVACAINE HYDROCHLORIDE 2.5 MG/ML
INJECTION, SOLUTION EPIDURAL; INFILTRATION; INTRACAUDAL
Status: DISCONTINUED | OUTPATIENT
Start: 2025-02-21 | End: 2025-02-21 | Stop reason: HOSPADM

## 2025-02-21 RX ORDER — ONDANSETRON HYDROCHLORIDE 2 MG/ML
4 INJECTION, SOLUTION INTRAVENOUS DAILY PRN
Status: DISCONTINUED | OUTPATIENT
Start: 2025-02-21 | End: 2025-02-21 | Stop reason: HOSPADM

## 2025-02-21 RX ORDER — BISACODYL 10 MG/1
10 SUPPOSITORY RECTAL DAILY PRN
Status: DISCONTINUED | OUTPATIENT
Start: 2025-02-22 | End: 2025-02-25 | Stop reason: HOSPADM

## 2025-02-21 RX ORDER — FAMOTIDINE 20 MG/1
20 TABLET, FILM COATED ORAL 2 TIMES DAILY
Status: DISCONTINUED | OUTPATIENT
Start: 2025-02-21 | End: 2025-02-25 | Stop reason: HOSPADM

## 2025-02-21 RX ORDER — ALBUTEROL SULFATE 90 UG/1
INHALANT RESPIRATORY (INHALATION)
Status: DISCONTINUED | OUTPATIENT
Start: 2025-02-21 | End: 2025-02-21

## 2025-02-21 RX ORDER — OXYCODONE AND ACETAMINOPHEN 5; 325 MG/1; MG/1
1 TABLET ORAL
Status: DISCONTINUED | OUTPATIENT
Start: 2025-02-21 | End: 2025-02-21 | Stop reason: HOSPADM

## 2025-02-21 RX ORDER — LIDOCAINE HYDROCHLORIDE 20 MG/ML
INJECTION INTRAVENOUS
Status: DISCONTINUED | OUTPATIENT
Start: 2025-02-21 | End: 2025-02-21

## 2025-02-21 RX ORDER — PHENYLEPHRINE HYDROCHLORIDE 10 MG/ML
INJECTION INTRAVENOUS
Status: DISCONTINUED | OUTPATIENT
Start: 2025-02-21 | End: 2025-02-21

## 2025-02-21 RX ORDER — MIDAZOLAM HYDROCHLORIDE 1 MG/ML
INJECTION INTRAMUSCULAR; INTRAVENOUS
Status: DISCONTINUED | OUTPATIENT
Start: 2025-02-21 | End: 2025-02-21

## 2025-02-21 RX ORDER — ONDANSETRON HYDROCHLORIDE 2 MG/ML
INJECTION, SOLUTION INTRAVENOUS
Status: DISCONTINUED | OUTPATIENT
Start: 2025-02-21 | End: 2025-02-21

## 2025-02-21 RX ORDER — SUCCINYLCHOLINE CHLORIDE 20 MG/ML
INJECTION INTRAMUSCULAR; INTRAVENOUS
Status: DISCONTINUED | OUTPATIENT
Start: 2025-02-21 | End: 2025-02-21

## 2025-02-21 RX ORDER — POLYETHYLENE GLYCOL 3350 17 G/17G
17 POWDER, FOR SOLUTION ORAL DAILY
Status: DISCONTINUED | OUTPATIENT
Start: 2025-02-21 | End: 2025-02-21

## 2025-02-21 RX ORDER — PROPOFOL 10 MG/ML
VIAL (ML) INTRAVENOUS
Status: DISCONTINUED | OUTPATIENT
Start: 2025-02-21 | End: 2025-02-21

## 2025-02-21 RX ORDER — POLYETHYLENE GLYCOL 3350 17 G/17G
17 POWDER, FOR SOLUTION ORAL DAILY
Status: DISCONTINUED | OUTPATIENT
Start: 2025-02-22 | End: 2025-02-25 | Stop reason: HOSPADM

## 2025-02-21 RX ORDER — GLUCAGON 1 MG
1 KIT INJECTION
Status: DISCONTINUED | OUTPATIENT
Start: 2025-02-21 | End: 2025-02-21 | Stop reason: HOSPADM

## 2025-02-21 RX ORDER — OXYCODONE HYDROCHLORIDE 5 MG/1
5 TABLET ORAL EVERY 4 HOURS PRN
Status: DISCONTINUED | OUTPATIENT
Start: 2025-02-21 | End: 2025-02-23

## 2025-02-21 RX ORDER — DEXAMETHASONE SODIUM PHOSPHATE 4 MG/ML
INJECTION, SOLUTION INTRA-ARTICULAR; INTRALESIONAL; INTRAMUSCULAR; INTRAVENOUS; SOFT TISSUE
Status: DISCONTINUED | OUTPATIENT
Start: 2025-02-21 | End: 2025-02-21

## 2025-02-21 RX ORDER — CEFAZOLIN 2 G/1
2 INJECTION, POWDER, FOR SOLUTION INTRAMUSCULAR; INTRAVENOUS
Status: DISCONTINUED | OUTPATIENT
Start: 2025-02-21 | End: 2025-02-22

## 2025-02-21 RX ORDER — ONDANSETRON 8 MG/1
8 TABLET, ORALLY DISINTEGRATING ORAL EVERY 8 HOURS PRN
Status: DISCONTINUED | OUTPATIENT
Start: 2025-02-21 | End: 2025-02-25 | Stop reason: HOSPADM

## 2025-02-21 RX ORDER — BUPIVACAINE 13.3 MG/ML
INJECTION, SUSPENSION, LIPOSOMAL INFILTRATION
Status: DISCONTINUED | OUTPATIENT
Start: 2025-02-21 | End: 2025-02-21 | Stop reason: HOSPADM

## 2025-02-21 RX ORDER — MORPHINE SULFATE 4 MG/ML
4 INJECTION, SOLUTION INTRAMUSCULAR; INTRAVENOUS EVERY 4 HOURS PRN
Status: DISCONTINUED | OUTPATIENT
Start: 2025-02-21 | End: 2025-02-25 | Stop reason: HOSPADM

## 2025-02-21 RX ORDER — ROCURONIUM BROMIDE 10 MG/ML
INJECTION, SOLUTION INTRAVENOUS
Status: DISCONTINUED | OUTPATIENT
Start: 2025-02-21 | End: 2025-02-21

## 2025-02-21 RX ADMIN — PHENYLEPHRINE HYDROCHLORIDE 400 MCG: 10 INJECTION INTRAVENOUS at 12:02

## 2025-02-21 RX ADMIN — FENTANYL CITRATE 50 MCG: 50 INJECTION, SOLUTION INTRAMUSCULAR; INTRAVENOUS at 12:02

## 2025-02-21 RX ADMIN — ENOXAPARIN SODIUM 40 MG: 40 INJECTION SUBCUTANEOUS at 05:02

## 2025-02-21 RX ADMIN — LIDOCAINE HYDROCHLORIDE 100 MG: 20 INJECTION INTRAVENOUS at 12:02

## 2025-02-21 RX ADMIN — HYDROMORPHONE HYDROCHLORIDE 0.2 MG: 2 INJECTION INTRAMUSCULAR; INTRAVENOUS; SUBCUTANEOUS at 02:02

## 2025-02-21 RX ADMIN — DEXAMETHASONE SODIUM PHOSPHATE 4 MG: 4 INJECTION, SOLUTION INTRA-ARTICULAR; INTRALESIONAL; INTRAMUSCULAR; INTRAVENOUS; SOFT TISSUE at 01:02

## 2025-02-21 RX ADMIN — SUCCINYLCHOLINE CHLORIDE 160 MG: 20 INJECTION, SOLUTION INTRAMUSCULAR; INTRAVENOUS; PARENTERAL at 12:02

## 2025-02-21 RX ADMIN — PHENYLEPHRINE HYDROCHLORIDE 200 MCG: 10 INJECTION INTRAVENOUS at 12:02

## 2025-02-21 RX ADMIN — GUAIFENESIN AND DEXTROMETHORPHAN HYDROBROMIDE 1 TABLET: 600; 30 TABLET, EXTENDED RELEASE ORAL at 09:02

## 2025-02-21 RX ADMIN — PROPOFOL 100 MG: 10 INJECTION, EMULSION INTRAVENOUS at 12:02

## 2025-02-21 RX ADMIN — PHENYLEPHRINE HYDROCHLORIDE 300 MCG: 10 INJECTION INTRAVENOUS at 01:02

## 2025-02-21 RX ADMIN — HYDROCODONE BITARTRATE AND ACETAMINOPHEN 1 TABLET: 7.5; 325 TABLET ORAL at 10:02

## 2025-02-21 RX ADMIN — MUPIROCIN 1 G: 20 OINTMENT TOPICAL at 09:02

## 2025-02-21 RX ADMIN — FAMOTIDINE 20 MG: 20 TABLET ORAL at 09:02

## 2025-02-21 RX ADMIN — ONDANSETRON 4 MG: 2 INJECTION INTRAMUSCULAR; INTRAVENOUS at 01:02

## 2025-02-21 RX ADMIN — SUGAMMADEX 200 MG: 100 INJECTION, SOLUTION INTRAVENOUS at 01:02

## 2025-02-21 RX ADMIN — SODIUM CHLORIDE, POTASSIUM CHLORIDE, SODIUM LACTATE AND CALCIUM CHLORIDE: 600; 310; 30; 20 INJECTION, SOLUTION INTRAVENOUS at 01:02

## 2025-02-21 RX ADMIN — MIDAZOLAM HYDROCHLORIDE 2 MG: 1 INJECTION, SOLUTION INTRAMUSCULAR; INTRAVENOUS at 11:02

## 2025-02-21 RX ADMIN — ALPRAZOLAM 0.25 MG: 0.25 TABLET ORAL at 09:02

## 2025-02-21 RX ADMIN — ONDANSETRON 8 MG: 8 TABLET, ORALLY DISINTEGRATING ORAL at 07:02

## 2025-02-21 RX ADMIN — MUPIROCIN: 20 OINTMENT TOPICAL at 09:02

## 2025-02-21 RX ADMIN — ALBUTEROL SULFATE 2 PUFF: 90 AEROSOL, METERED RESPIRATORY (INHALATION) at 01:02

## 2025-02-21 RX ADMIN — OXYCODONE HYDROCHLORIDE 5 MG: 5 TABLET ORAL at 07:02

## 2025-02-21 RX ADMIN — SODIUM CHLORIDE, POTASSIUM CHLORIDE, SODIUM LACTATE AND CALCIUM CHLORIDE: 600; 310; 30; 20 INJECTION, SOLUTION INTRAVENOUS at 11:02

## 2025-02-21 RX ADMIN — HYDROMORPHONE HYDROCHLORIDE 0.2 MG: 2 INJECTION INTRAMUSCULAR; INTRAVENOUS; SUBCUTANEOUS at 01:02

## 2025-02-21 RX ADMIN — PHENYLEPHRINE HYDROCHLORIDE 500 MCG: 10 INJECTION INTRAVENOUS at 01:02

## 2025-02-21 RX ADMIN — ROCURONIUM BROMIDE 50 MG: 10 SOLUTION INTRAVENOUS at 12:02

## 2025-02-21 NOTE — TRANSFER OF CARE
"Anesthesia Transfer of Care Note    Patient: Octavio Pang    Procedure(s) Performed: Procedure(s) (LRB):  VATS, WITH PLEURODESIS (Right)  BLOCK, NERVE, INTERCOSTAL, 2 OR MORE (Right)    Patient location: PACU    Anesthesia Type: general    Transport from OR: Transported from OR on room air with adequate spontaneous ventilation    Post pain: adequate analgesia    Post assessment: no apparent anesthetic complications and tolerated procedure well    Post vital signs: stable    Level of consciousness: awake    Nausea/Vomiting: no nausea/vomiting    Complications: none    Transfer of care protocol was followed    Last vitals: Visit Vitals  /72 (Patient Position: Sitting)   Pulse 103   Temp 36.7 °C (98 °F) (Oral)   Resp 18   Ht 5' 8" (1.727 m)   Wt 58 kg (127 lb 13.9 oz)   SpO2 96%   BMI 19.44 kg/m²     "

## 2025-02-21 NOTE — ANESTHESIA PREPROCEDURE EVALUATION
02/21/2025  Octavio Pang is a 59 y.o., male    Past Medical History:   Diagnosis Date    ADD (attention deficit disorder)     Anxiety     Back pain     Depression     Gout     Neuromuscular disorder      Past Surgical History:   Procedure Laterality Date    BACK SURGERY  10/13/2016    NECK SURGERY         Chemistry        Component Value Date/Time     (L) 02/21/2025 0421    K 4.6 02/21/2025 0421     02/21/2025 0421    CO2 24 02/21/2025 0421    BUN 18 02/21/2025 0421    CREATININE 0.7 02/21/2025 0421    GLU 95 02/21/2025 0421        Component Value Date/Time    CALCIUM 9.1 02/21/2025 0421    ALKPHOS 61 02/18/2025 1751    AST 22 02/18/2025 1751    ALT 15 02/18/2025 1751    BILITOT 0.2 02/18/2025 1751    ESTGFRAFRICA >60.0 03/01/2018 1529    EGFRNONAA >60.0 03/01/2018 1529        Lab Results   Component Value Date    WBC 7.97 02/21/2025    HGB 12.8 (L) 02/21/2025    HCT 38.9 (L) 02/21/2025    MCV 92 02/21/2025     (H) 02/21/2025             Pre-op Assessment    I have reviewed the Patient Summary Reports.    I have reviewed the NPO Status.   I have reviewed the Medications.     Review of Systems  Anesthesia Hx:  No problems with previous Anesthesia   History of prior surgery of interest to airway management or planning:  Previous anesthesia: General          Denies Personal Hx of Anesthesia complications.                    Social:  Smoker Lifelong smoker - 1ppd x 50+ yrs      Hematology/Oncology:  Hematology Normal   Oncology Normal                                   Cardiovascular:  Cardiovascular Normal                  ECG has been reviewed.                            Pulmonary:  Pulmonary Normal        Spontaneous Rt pneumo-tx - catheter in place               Renal/:  Renal/ Normal                 Musculoskeletal:     S/p ACDF       Spine Disorders: cervical             Neurological:  Neurology Normal                                      Endocrine:  Endocrine Normal    BMI 19        Psych:  Psychiatric History  depression ADD - last Vyvanse > 48 hrs               Physical Exam  General: Cooperative, Alert, Oriented and Cachexia    Airway:  Mallampati: III   Mouth Opening: Small, but > 3cm  TM Distance: Normal  Tongue: Normal  Neck ROM: Normal ROM    Dental:  Periodontal disease  Remaining teeth chipped, broken, etc. - risk of damage or loss discussed and patient voiced understanding; denies any loose       Anesthesia Plan  Type of Anesthesia, risks & benefits discussed:    Anesthesia Type: Gen ETT  Intra-op Monitoring Plan: Standard ASA Monitors and Art Line  Post Op Pain Control Plan: multimodal analgesia and IV/PO Opioids PRN  Induction:  IV  Airway Plan: Video, Post-Induction  Informed Consent: Informed consent signed with the Patient and all parties understand the risks and agree with anesthesia plan.  All questions answered.   ASA Score: 2  Day of Surgery Review of History & Physical: H&P Update referred to the surgeon/provider.    Ready For Surgery From Anesthesia Perspective.     .

## 2025-02-21 NOTE — SUBJECTIVE & OBJECTIVE
Interval History:     No acute events overnight   Resting comfortably   Hemodynamically stable   NPO since midnight, scheduled for VATS today   Updated plan of care to patient/sister at bedside, agreed    Review of Systems      Constitutional:  Negative for fever.   HENT:  Negative for congestion.    Respiratory:  Positive for cough (intermittent). Negative for shortness of breath.    Cardiovascular:  Negative for leg swelling.   Gastrointestinal:  Negative for abdominal distention.      Objective:     Vital Signs (Most Recent):  Temp: 98 °F (36.7 °C) (02/21/25 0816)  Pulse: 103 (02/21/25 0900)  Resp: 18 (02/21/25 1025)  BP: 110/72 (02/21/25 0816)  SpO2: 96 % (02/21/25 0816) Vital Signs (24h Range):  Temp:  [97.2 °F (36.2 °C)-98.2 °F (36.8 °C)] 98 °F (36.7 °C)  Pulse:  [] 103  Resp:  [16-20] 18  SpO2:  [95 %-99 %] 96 %  BP: (103-114)/(59-72) 110/72     Weight: 58 kg (127 lb 13.9 oz)  Body mass index is 19.44 kg/m².    Intake/Output Summary (Last 24 hours) at 2/21/2025 1318  Last data filed at 2/21/2025 1302  Gross per 24 hour   Intake 1240 ml   Output 830 ml   Net 410 ml         Physical Exam      Constitutional:       General: He is awake.      Appearance: He is underweight.   HENT:      Mouth/Throat:      Dentition: Abnormal dentition. Dental caries present.   Cardiovascular:      Rate and Rhythm: Normal rate.      Pulses:           Radial pulses are 2+ on the right side and 2+ on the left side.   Pulmonary:      Effort: Pulmonary effort is normal.      Breath sounds: Decreased breath sounds present.      Comments: R pigtail CT in place to suction, on RA  Abdominal:      General: Abdomen is flat. There is no distension.      Palpations: Abdomen is soft.   Musculoskeletal:      Comments: EPSTEIN   Skin:     General: Skin is warm and dry.   Neurological:      General: No focal deficit present.      Mental Status: He is alert.   Psychiatric:         Attention and Perception: Attention normal.         Behavior:  Behavior is cooperative.      Comments: calm   Significant Labs: All pertinent labs within the past 24 hours have been reviewed.  CBC:   Recent Labs   Lab 02/20/25 0430 02/21/25 0421   WBC 9.13 7.97   HGB 13.1* 12.8*   HCT 39.4* 38.9*   * 462*     CMP:   Recent Labs   Lab 02/20/25  0430 02/21/25  0421   * 135*   K 4.2 4.6    101   CO2 24 24    95   BUN 15 18   CREATININE 0.7 0.7   CALCIUM 9.6 9.1   ANIONGAP 10 10       Significant Imaging:   Imaging Results              CT Chest With Contrast (Final result)  Result time 02/18/25 19:22:32      Final result by Blanco Page MD (02/18/25 19:22:32)                   Impression:      As above    All CT scans   are performed using dose optimization techniques including the following: automated exposure control; adjustment of the mA and/or kV; use of iterative reconstruction technique.  Dose modulation was employed for ALARA by means of: Automated exposure control; adjustment of the mA and/or kV according to patient size (this includes techniques or standardized protocols for targeted exams where dose is matched to indication/reason for exam; i.e. extremities or head); and/or use of iterative reconstructive technique.      Electronically signed by: Blanco Page  Date:    02/18/2025  Time:    19:22               Narrative:    EXAMINATION:  CT CHEST WITH CONTRAST    CLINICAL HISTORY:  Soft tissue mass, chest, US/xray nondiagnostic;    TECHNIQUE:  Axial images through the chest were obtained after the IV administration of 75 mL Omnipaque 350. Coronal and sagittal images obtained.    COMPARISON:  No prior chest CT available    FINDINGS:  Tiny left apical pneumothorax.  Mild moderate right-sided pneumothorax.  Mild moderate subpleural reticular and subpleural opacity in the right greater than left lung.  Anterior right pleural drainage catheter is identified.  Heart is not enlarged.  No aneurysm.  No pulmonary embolism.  Mild right sided pleural  effusion.  Slight left-sided pleural effusion.  Coronary artery calcification.  Mediastinal lymph nodes behind the left atrium suspected.  Correlate clinically for pneumonia.                                       X-Ray Chest AP Portable (Final result)  Result time 02/18/25 18:21:22      Final result by Blanco Page MD (02/18/25 18:21:22)                   Impression:      As above      Electronically signed by: Blanco Page  Date:    02/18/2025  Time:    18:21               Narrative:    EXAMINATION:  XR CHEST AP PORTABLE    CLINICAL HISTORY:  Presence of other specified functional implants    TECHNIQUE:  Single frontal view of the chest was performed.    COMPARISON:  None    FINDINGS:  Redemonstration of right-sided pneumothorax, questionably slightly smaller.  Coarse interstitial and alveolar markings in the bilateral lungs.  Trace left apical pneumothorax.  Otherwise similar findings to prior exam.  Cervical hardware.                                       X-Ray Chest AP Portable (Final result)  Result time 02/18/25 16:40:00      Final result by Blanco Page MD (02/18/25 16:40:00)                   Impression:      Stable exam      Electronically signed by: Blanco Page  Date:    02/18/2025  Time:    16:40               Narrative:    EXAMINATION:  XR CHEST AP PORTABLE    CLINICAL HISTORY:  Pneumothorax;    TECHNIQUE:  Single frontal view of the chest was performed.    COMPARISON:  Prior dated 02/18/2025 at 09:50    FINDINGS:  Right-sided pneumothorax unchanged since the prior exam.  Coarse interstitial alveolar remain.  Cervical hardware.    Bones are intact.

## 2025-02-21 NOTE — ANESTHESIA PROCEDURE NOTES
Intubation    Date/Time: 2/21/2025 12:20 PM    Performed by: Shun Craig CRNA  Authorized by: Shun Craig CRNA    Intubation:     Induction:  Intravenous    Mask Ventilation:  Easy mask    Attempts:  1    Attempted By:  Staff anesthesiologist    Method of Intubation:  Video laryngoscopy    Blade:  Glidescope 3    Laryngeal View Grade: Grade I - full view of cords      Difficult Airway Encountered?: No      Complications:  None    Airway Device:  Double lumen tube left    Airway Device Size:  37F    Style/Cuff Inflation:  Cuffed (inflated to minimal occlusive pressure)    Tube secured:  31    Secured at:  The lips    Placement Verified By:  Capnometry and Fiber optic visualization    Complicating Factors:  None    Findings Post-Intubation:  BS equal bilateral

## 2025-02-21 NOTE — PLAN OF CARE
A232/A232 SOMMER Pang is a 59 y.o.male admitted on 2/18/2025 for Spontaneous pneumothorax   Code Status: Full Code MRN: [id number]   Review of patient's allergies indicates:   Allergen Reactions    Ancef [cefazolin] Other (See Comments)     Headaches and dizziness     Past Medical History:   Diagnosis Date    ADD (attention deficit disorder)     Anxiety     Back pain     Depression     Gout     Neuromuscular disorder       PRN meds    acetaminophen, 650 mg, Q4H PRN  albuterol-ipratropium, 3 mL, Q4H PRN  ALPRAZolam, 0.25 mg, BID PRN  HYDROcodone-acetaminophen, 1 tablet, Q4H PRN  influenza, 0.5 mL, vaccine x 1 dose  ondansetron, 8 mg, Q8H PRN  pneumoc 20-tres conj-dip cr(PF), 0.5 mL, vaccine x 1 dose  senna-docusate 8.6-50 mg, 1 tablet, Daily PRN  sodium chloride 0.9%, 10 mL, PRN  sodium chloride 0.9%, 10 mL, PRN  sodium chloride 0.9%, 10 mL, PRN      Chart check completed. Will continue plan of care.      Orientation: oriented x 4  McKee Coma Scale Score: 15     Lead Monitored: Lead II Rhythm: sinus tachycardia, normal sinus rhythm    Cardiac/Telemetry Box Number: [id number]  VTE Core Measure: Pharmacological prophylaxis initiated/maintained Last Bowel Movement: 02/18/25  Diet NPO Except for: Sips with Medication     Mundo Score: 20  Fall Risk Score: 12  Accucheck []   Freq?      Lines/Drains/Airways       Drain  Duration                  Chest Tube 02/18/25 2111 Tube - 1 Right 2 days              Peripheral Intravenous Line  Duration                  Peripheral IV - Single Lumen 02/18/25 1650 18 G Anterior;Right Forearm 2 days

## 2025-02-21 NOTE — OP NOTE
[facility] ([facility])  Cardiothoracic Surgery  Operative Note    SUMMARY     Date of Procedure: 2/21/2025     Procedure: Procedure(s) (LRB):  VATS, WITH PLEURODESIS (Right)  BLOCK, NERVE, INTERCOSTAL, 2 OR MORE (Right)    Surgeons and Role:     * Tim Moss MD - Primary    Assisting Surgeon: None    Pre-Operative Diagnosis: Spontaneous pneumothorax [J93.83]    Post-Operative Diagnosis: Post-Op Diagnosis Codes:     * Spontaneous pneumothorax [J93.83]    Anesthesia: General    Operative Findings (including complications, if any):  The aortic lung bullous disease    Description of Technical Procedures:  The patient was taken to the operating room placed in a supine position endotracheal general anesthesia was given monitoring lines were placed preoperative antibiotics were given surgical time-out was done the patient was placed and a left lateral position with adequate padding protecting the pressure points.  A Carroll catheter was introduced under aseptic precautions  We trouble shot the lv scope and kept ready.  I used the pigtail catheter which was previously placed in the left chest to place a guidewire.  The pigtail catheter was then removed over the guidewire then an anoscope with the sheath was introduced into the chest cavity.  Diagnostic thoracoscopy was done in a systematic fashion.  Multiple adhesions were found..  At this time under vision I gave intercostal nerve block with nerve root involving 55617 and 9 with a 0.25% Marcaine..  Then I made a working port a 5 mm port in the 7th intercostal space in the midaxillary line..  I did a mechanical pleurodesis with a Bovie scratch pad with the instrument starting with the apex moving down sent in a systematic fashion.  Under completion of that I used doxycycline 1 g to spray inside the chest cavity.  The 5 mm port was pulled and placed a 28 straight Marshallese chest tube into the thoracic cavity.  The underlying lung was found to be expanding well.  At  this time I pulled the scope and concluded the procedure.  The patient tolerated the procedure was transferred to the recovery in stable condition after extubation.      Significant Surgical Tasks Conducted by the Assistant(s), if Applicable:     Estimated Blood Loss (EBL): * No values recorded between 2/21/2025 12:49 PM and 2/21/2025  1:50 PM *           Implants: * No implants in log *    Specimens:   Specimen (24h ago, onward)      None                    Condition: Stable    Disposition: PACU - hemodynamically stable.    Attestation: I was present and scrubbed for the entire procedure.

## 2025-02-21 NOTE — PROGRESS NOTES
[facility] ([facility])  [facility]  Progress Note    Patient Name: Octavio Pang  MRN: [id number]  Patient Class: IP- Inpatient   Admission Date: 2/18/2025  Length of Stay: 3 days  Attending Physician: Shelli Waterman,*  Primary Care Provider: Herman Quan MD        Subjective     Principal Problem:Spontaneous pneumothorax        HPI:  59-year-old male with a known past medical history which includes ADHD, anxiety, depression, back pain, and tobacco abuse (reports less than 1 ppd) since age of 18 that presented to his PCP for evaluation of worsening shortness of breath over the past couple of months.  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  Patient underwent chest x-ray and he reports a few hours later he was called and told to go to the ER as he had bilateral pneumothoraces and concern for pneumonia on his chest x-ray.  At the time of arrival in the ER, patient underwent another x-ray which did reveal right greater than left bilateral pneumothoraces and a small bore chest tube was placed on the right.  Follow up x-rays with tiny left apical pneumothorax was still a moderate size right pneumothorax.  Patient was admitted to the ICU for close observation.  Patient remains on room air with adequate O2 sats.  He has no other complaints.  He is deemed stable for transfer out of the ICU to the floor in the morning of 2/19 for which Hospital Medicine was consulted to assume care.  CTS has been consulted for further evaluation and management of right pneumothorax.     At the time of my exam in the ICU, the pt is awake and alert on RA in NAD. He quite thin. He is able to provide history and answer questions. Right sided pigtail chest tube in place connected to suction, but float not activated -- have discussed with nursing to  ensure he is at -20 cm suction. Pt awaiting transfer to the floor.    Overview/Hospital Course:  2/19: transferred from the ICU to the floor with R CT in place to suction, on RA in NAD, CTS consulted, no new issues or c/o noted   2/20-- CXR showed Reduced right pneumothorax.  CTS planning for possible right VATS and chemical pleurodesis and any other indicated procedure on 02/21/2025.  Patient stated improvement in cough, denied acute events, resting comfortably, hemodynamically stable.    2/22-- patient scheduled for VATS per Cardiothoracic surgery today    Interval History:     No acute events overnight   Resting comfortably   Hemodynamically stable   NPO since midnight, scheduled for VATS today   Updated plan of care to patient/sister at bedside, agreed    Review of Systems      Constitutional:  Negative for fever.   HENT:  Negative for congestion.    Respiratory:  Positive for cough (intermittent). Negative for shortness of breath.    Cardiovascular:  Negative for leg swelling.   Gastrointestinal:  Negative for abdominal distention.      Objective:     Vital Signs (Most Recent):  Temp: 98 °F (36.7 °C) (02/21/25 0816)  Pulse: 103 (02/21/25 0900)  Resp: 18 (02/21/25 1025)  BP: 110/72 (02/21/25 0816)  SpO2: 96 % (02/21/25 0816) Vital Signs (24h Range):  Temp:  [97.2 °F (36.2 °C)-98.2 °F (36.8 °C)] 98 °F (36.7 °C)  Pulse:  [] 103  Resp:  [16-20] 18  SpO2:  [95 %-99 %] 96 %  BP: (103-114)/(59-72) 110/72     Weight: 58 kg (127 lb 13.9 oz)  Body mass index is 19.44 kg/m².    Intake/Output Summary (Last 24 hours) at 2/21/2025 1318  Last data filed at 2/21/2025 1302  Gross per 24 hour   Intake 1240 ml   Output 830 ml   Net 410 ml         Physical Exam      Constitutional:       General: He is awake.      Appearance: He is underweight.   HENT:      Mouth/Throat:      Dentition: Abnormal dentition. Dental caries present.   Cardiovascular:      Rate and Rhythm: Normal rate.      Pulses:           Radial pulses are 2+  on the right side and 2+ on the left side.   Pulmonary:      Effort: Pulmonary effort is normal.      Breath sounds: Decreased breath sounds present.      Comments: R pigtail CT in place to suction, on RA  Abdominal:      General: Abdomen is flat. There is no distension.      Palpations: Abdomen is soft.   Musculoskeletal:      Comments: EPSTEIN   Skin:     General: Skin is warm and dry.   Neurological:      General: No focal deficit present.      Mental Status: He is alert.   Psychiatric:         Attention and Perception: Attention normal.         Behavior: Behavior is cooperative.      Comments: calm   Significant Labs: All pertinent labs within the past 24 hours have been reviewed.  CBC:   Recent Labs   Lab 02/20/25 0430 02/21/25 0421   WBC 9.13 7.97   HGB 13.1* 12.8*   HCT 39.4* 38.9*   * 462*     CMP:   Recent Labs   Lab 02/20/25 0430 02/21/25 0421   * 135*   K 4.2 4.6    101   CO2 24 24    95   BUN 15 18   CREATININE 0.7 0.7   CALCIUM 9.6 9.1   ANIONGAP 10 10       Significant Imaging:   Imaging Results              CT Chest With Contrast (Final result)  Result time 02/18/25 19:22:32      Final result by Blanco Page MD (02/18/25 19:22:32)                   Impression:      As above    All CT scans   are performed using dose optimization techniques including the following: automated exposure control; adjustment of the mA and/or kV; use of iterative reconstruction technique.  Dose modulation was employed for ALARA by means of: Automated exposure control; adjustment of the mA and/or kV according to patient size (this includes techniques or standardized protocols for targeted exams where dose is matched to indication/reason for exam; i.e. extremities or head); and/or use of iterative reconstructive technique.      Electronically signed by: Blanco Page  Date:    02/18/2025  Time:    19:22               Narrative:    EXAMINATION:  CT CHEST WITH CONTRAST    CLINICAL HISTORY:  Soft  tissue mass, chest, US/xray nondiagnostic;    TECHNIQUE:  Axial images through the chest were obtained after the IV administration of 75 mL Omnipaque 350. Coronal and sagittal images obtained.    COMPARISON:  No prior chest CT available    FINDINGS:  Tiny left apical pneumothorax.  Mild moderate right-sided pneumothorax.  Mild moderate subpleural reticular and subpleural opacity in the right greater than left lung.  Anterior right pleural drainage catheter is identified.  Heart is not enlarged.  No aneurysm.  No pulmonary embolism.  Mild right sided pleural effusion.  Slight left-sided pleural effusion.  Coronary artery calcification.  Mediastinal lymph nodes behind the left atrium suspected.  Correlate clinically for pneumonia.                                       X-Ray Chest AP Portable (Final result)  Result time 02/18/25 18:21:22      Final result by Blanco Page MD (02/18/25 18:21:22)                   Impression:      As above      Electronically signed by: Blanco Page  Date:    02/18/2025  Time:    18:21               Narrative:    EXAMINATION:  XR CHEST AP PORTABLE    CLINICAL HISTORY:  Presence of other specified functional implants    TECHNIQUE:  Single frontal view of the chest was performed.    COMPARISON:  None    FINDINGS:  Redemonstration of right-sided pneumothorax, questionably slightly smaller.  Coarse interstitial and alveolar markings in the bilateral lungs.  Trace left apical pneumothorax.  Otherwise similar findings to prior exam.  Cervical hardware.                                       X-Ray Chest AP Portable (Final result)  Result time 02/18/25 16:40:00      Final result by Blanco Page MD (02/18/25 16:40:00)                   Impression:      Stable exam      Electronically signed by: Blanco Page  Date:    02/18/2025  Time:    16:40               Narrative:    EXAMINATION:  XR CHEST AP PORTABLE    CLINICAL HISTORY:  Pneumothorax;    TECHNIQUE:  Single frontal view of the chest  was performed.    COMPARISON:  Prior dated 02/18/2025 at 09:50    FINDINGS:  Right-sided pneumothorax unchanged since the prior exam.  Coarse interstitial alveolar remain.  Cervical hardware.    Bones are intact.                                       Assessment and Plan     * Spontaneous pneumothorax  -spontaneous bilateral pneumothoraces present on admit, right-greater-than-left  -continue right pigtail chest tube to -20 cm of suction   -CTS consulted further evaluation and recommendations  -patient on room air, goal O2 sat greater than 92%  -daily chest x-rays  -? underlying ILD v other given environmental exposures  -would benefit from establishing with pulm as an outpt as he has underlying lung disease and reports has no evaluation of it     Abnormal CT of the chest  - see plan for ptx      Anxiety  - PRN xanax  - supportive care      Tobacco smoker, 1 pack of cigarettes or less per day  - cessation education during admission, cessation provided this AM  - refusing nicotine patch currently, will add if pt would like as some point         VTE Risk Mitigation (From admission, onward)           Ordered     enoxaparin injection 40 mg  Daily         02/18/25 2115                    Discharge Planning   GUILLE:      Code Status: Full Code   Medical Readiness for Discharge Date:   Discharge Plan A: Home Health                        Jose Carlosjuan manuel Waterman MD  Department of Hospital Medicine   [facility] (Hospital)

## 2025-02-22 LAB
ANION GAP SERPL CALC-SCNC: 10 MMOL/L (ref 8–16)
BASOPHILS # BLD AUTO: 0.06 K/UL (ref 0–0.2)
BASOPHILS # BLD AUTO: 0.06 K/UL (ref 0–0.2)
BASOPHILS NFR BLD: 0.5 % (ref 0–1.9)
BASOPHILS NFR BLD: 0.5 % (ref 0–1.9)
BUN SERPL-MCNC: 20 MG/DL (ref 6–20)
CALCIUM SERPL-MCNC: 9.6 MG/DL (ref 8.7–10.5)
CHLORIDE SERPL-SCNC: 99 MMOL/L (ref 95–110)
CO2 SERPL-SCNC: 26 MMOL/L (ref 23–29)
CREAT SERPL-MCNC: 0.8 MG/DL (ref 0.5–1.4)
DIFFERENTIAL METHOD BLD: ABNORMAL
DIFFERENTIAL METHOD BLD: ABNORMAL
EOSINOPHIL # BLD AUTO: 0 K/UL (ref 0–0.5)
EOSINOPHIL # BLD AUTO: 0 K/UL (ref 0–0.5)
EOSINOPHIL NFR BLD: 0.3 % (ref 0–8)
EOSINOPHIL NFR BLD: 0.3 % (ref 0–8)
ERYTHROCYTE [DISTWIDTH] IN BLOOD BY AUTOMATED COUNT: 13.2 % (ref 11.5–14.5)
ERYTHROCYTE [DISTWIDTH] IN BLOOD BY AUTOMATED COUNT: 13.2 % (ref 11.5–14.5)
EST. GFR  (NO RACE VARIABLE): >60 ML/MIN/1.73 M^2
GLUCOSE SERPL-MCNC: 102 MG/DL (ref 70–110)
HCT VFR BLD AUTO: 39.7 % (ref 40–54)
HCT VFR BLD AUTO: 39.7 % (ref 40–54)
HGB BLD-MCNC: 13.1 G/DL (ref 14–18)
HGB BLD-MCNC: 13.1 G/DL (ref 14–18)
IMM GRANULOCYTES # BLD AUTO: 0.08 K/UL (ref 0–0.04)
IMM GRANULOCYTES # BLD AUTO: 0.08 K/UL (ref 0–0.04)
IMM GRANULOCYTES NFR BLD AUTO: 0.6 % (ref 0–0.5)
IMM GRANULOCYTES NFR BLD AUTO: 0.6 % (ref 0–0.5)
LYMPHOCYTES # BLD AUTO: 1.3 K/UL (ref 1–4.8)
LYMPHOCYTES # BLD AUTO: 1.3 K/UL (ref 1–4.8)
LYMPHOCYTES NFR BLD: 10.3 % (ref 18–48)
LYMPHOCYTES NFR BLD: 10.3 % (ref 18–48)
MAGNESIUM SERPL-MCNC: 1.5 MG/DL (ref 1.6–2.6)
MCH RBC QN AUTO: 30.8 PG (ref 27–31)
MCH RBC QN AUTO: 30.8 PG (ref 27–31)
MCHC RBC AUTO-ENTMCNC: 33 G/DL (ref 32–36)
MCHC RBC AUTO-ENTMCNC: 33 G/DL (ref 32–36)
MCV RBC AUTO: 93 FL (ref 82–98)
MCV RBC AUTO: 93 FL (ref 82–98)
MONOCYTES # BLD AUTO: 1.7 K/UL (ref 0.3–1)
MONOCYTES # BLD AUTO: 1.7 K/UL (ref 0.3–1)
MONOCYTES NFR BLD: 12.9 % (ref 4–15)
MONOCYTES NFR BLD: 12.9 % (ref 4–15)
NEUTROPHILS # BLD AUTO: 9.6 K/UL (ref 1.8–7.7)
NEUTROPHILS # BLD AUTO: 9.6 K/UL (ref 1.8–7.7)
NEUTROPHILS NFR BLD: 75.4 % (ref 38–73)
NEUTROPHILS NFR BLD: 75.4 % (ref 38–73)
NRBC BLD-RTO: 0 /100 WBC
NRBC BLD-RTO: 0 /100 WBC
PLATELET # BLD AUTO: 462 K/UL (ref 150–450)
PLATELET # BLD AUTO: 462 K/UL (ref 150–450)
PMV BLD AUTO: 8.7 FL (ref 9.2–12.9)
PMV BLD AUTO: 8.7 FL (ref 9.2–12.9)
POTASSIUM SERPL-SCNC: 4.3 MMOL/L (ref 3.5–5.1)
RBC # BLD AUTO: 4.26 M/UL (ref 4.6–6.2)
RBC # BLD AUTO: 4.26 M/UL (ref 4.6–6.2)
SODIUM SERPL-SCNC: 135 MMOL/L (ref 136–145)
WBC # BLD AUTO: 12.76 K/UL (ref 3.9–12.7)
WBC # BLD AUTO: 12.76 K/UL (ref 3.9–12.7)

## 2025-02-22 PROCEDURE — 97116 GAIT TRAINING THERAPY: CPT

## 2025-02-22 PROCEDURE — 97165 OT EVAL LOW COMPLEX 30 MIN: CPT

## 2025-02-22 PROCEDURE — 25000003 PHARM REV CODE 250: Performed by: STUDENT IN AN ORGANIZED HEALTH CARE EDUCATION/TRAINING PROGRAM

## 2025-02-22 PROCEDURE — 27000221 HC OXYGEN, UP TO 24 HOURS

## 2025-02-22 PROCEDURE — 85025 COMPLETE CBC W/AUTO DIFF WBC: CPT | Performed by: NURSE PRACTITIONER

## 2025-02-22 PROCEDURE — 94799 UNLISTED PULMONARY SVC/PX: CPT

## 2025-02-22 PROCEDURE — 63600175 PHARM REV CODE 636 W HCPCS: Performed by: NURSE PRACTITIONER

## 2025-02-22 PROCEDURE — 83735 ASSAY OF MAGNESIUM: CPT | Performed by: NURSE PRACTITIONER

## 2025-02-22 PROCEDURE — 97162 PT EVAL MOD COMPLEX 30 MIN: CPT

## 2025-02-22 PROCEDURE — 63600175 PHARM REV CODE 636 W HCPCS: Performed by: STUDENT IN AN ORGANIZED HEALTH CARE EDUCATION/TRAINING PROGRAM

## 2025-02-22 PROCEDURE — 94640 AIRWAY INHALATION TREATMENT: CPT

## 2025-02-22 PROCEDURE — 63600175 PHARM REV CODE 636 W HCPCS: Performed by: THORACIC SURGERY (CARDIOTHORACIC VASCULAR SURGERY)

## 2025-02-22 PROCEDURE — 94761 N-INVAS EAR/PLS OXIMETRY MLT: CPT

## 2025-02-22 PROCEDURE — 25000003 PHARM REV CODE 250: Performed by: THORACIC SURGERY (CARDIOTHORACIC VASCULAR SURGERY)

## 2025-02-22 PROCEDURE — 36415 COLL VENOUS BLD VENIPUNCTURE: CPT | Performed by: NURSE PRACTITIONER

## 2025-02-22 PROCEDURE — 25000242 PHARM REV CODE 250 ALT 637 W/ HCPCS: Performed by: THORACIC SURGERY (CARDIOTHORACIC VASCULAR SURGERY)

## 2025-02-22 PROCEDURE — 80048 BASIC METABOLIC PNL TOTAL CA: CPT | Performed by: NURSE PRACTITIONER

## 2025-02-22 PROCEDURE — 97530 THERAPEUTIC ACTIVITIES: CPT

## 2025-02-22 PROCEDURE — 21400001 HC TELEMETRY ROOM

## 2025-02-22 PROCEDURE — 99900035 HC TECH TIME PER 15 MIN (STAT)

## 2025-02-22 RX ADMIN — FAMOTIDINE 20 MG: 20 TABLET ORAL at 10:02

## 2025-02-22 RX ADMIN — MORPHINE SULFATE 4 MG: 4 INJECTION INTRAVENOUS at 07:02

## 2025-02-22 RX ADMIN — POLYETHYLENE GLYCOL 3350 17 G: 17 POWDER, FOR SOLUTION ORAL at 10:02

## 2025-02-22 RX ADMIN — OXYCODONE HYDROCHLORIDE 5 MG: 5 TABLET ORAL at 10:02

## 2025-02-22 RX ADMIN — MUPIROCIN 1 G: 20 OINTMENT TOPICAL at 08:02

## 2025-02-22 RX ADMIN — MUPIROCIN 1 G: 20 OINTMENT TOPICAL at 10:02

## 2025-02-22 RX ADMIN — VANCOMYCIN HYDROCHLORIDE 1000 MG: 1 INJECTION, POWDER, LYOPHILIZED, FOR SOLUTION INTRAVENOUS at 04:02

## 2025-02-22 RX ADMIN — IPRATROPIUM BROMIDE 0.5 MG: 0.5 SOLUTION RESPIRATORY (INHALATION) at 08:02

## 2025-02-22 RX ADMIN — MORPHINE SULFATE 4 MG: 4 INJECTION INTRAVENOUS at 08:02

## 2025-02-22 RX ADMIN — MORPHINE SULFATE 4 MG: 4 INJECTION INTRAVENOUS at 12:02

## 2025-02-22 RX ADMIN — FAMOTIDINE 20 MG: 20 TABLET ORAL at 08:02

## 2025-02-22 RX ADMIN — IPRATROPIUM BROMIDE 0.5 MG: 0.5 SOLUTION RESPIRATORY (INHALATION) at 07:02

## 2025-02-22 RX ADMIN — ENOXAPARIN SODIUM 40 MG: 40 INJECTION SUBCUTANEOUS at 04:02

## 2025-02-22 RX ADMIN — GUAIFENESIN AND DEXTROMETHORPHAN HYDROBROMIDE 1 TABLET: 600; 30 TABLET, EXTENDED RELEASE ORAL at 08:02

## 2025-02-22 RX ADMIN — IPRATROPIUM BROMIDE 0.5 MG: 0.5 SOLUTION RESPIRATORY (INHALATION) at 04:02

## 2025-02-22 RX ADMIN — IPRATROPIUM BROMIDE 0.5 MG: 0.5 SOLUTION RESPIRATORY (INHALATION) at 12:02

## 2025-02-22 NOTE — PT/OT/SLP EVAL
"Physical Therapy Evaluation and Discharge Note    Patient Name:  Octavio Pang   MRN:  [id number]    Recommendations:     Discharge Recommendations: No Therapy Indicated  Discharge Equipment Recommendations: none   Barriers to discharge: None    Assessment:     Octavio Pang is a 59 y.o. male admitted with a medical diagnosis of Spontaneous pneumothorax.  At this time, patient is performing functional tasks independently and does not require further acute PT services. Pt reported SOB at end of gait activity but able to tolerate task. Please re-consult should anything change.    Recent Surgery: Procedure(s) (LRB):  VATS, WITH PLEURODESIS (Right)  BLOCK, NERVE, INTERCOSTAL, 2 OR MORE (Right) 1 Day Post-Op    Plan:     During this hospitalization, patient does not require further acute PT services.  Please re-consult if situation changes.      Subjective     Chief Complaint: chest tube, "ready to get out of bed"  Patient/Family Comments/goals: to get better/go home  Pain/Comfort:  Pain Rating 1: 0/10  Pain Rating Post-Intervention 1: 0/10    Patients cultural, spiritual, Christianity conflicts given the current situation: no    Living Environment:  Pt lives alone in [facility] with no steps at entry  Prior to admission, patients level of function was independent with ADLs, ambulatory in home and community. Pt drives and works intermittently with [employer].  Equipment used at home: shower chair.  DME owned (not currently used): rolling walker and single point cane.  Upon discharge, patient will have assistance from unknown.    Objective:     Communicated with nursing (Suzy) and performed chart review via epic system prior to session.  Patient found supine with peripheral IV, telemetry, chest tube upon PT entry to room. Nsg called to bedside to to address chest tube for gait out of room    General Precautions: Standard, fall    Orthopedic Precautions:N/A   Braces: N/A  Respiratory Status: Room air    Exams:  Cognitive Exam:  " Patient is oriented to Person, Place, Time, and Situation  Gross Motor Coordination:  WFL  Postural Exam:  Patient presented with the following abnormalities:    -       Rounded shoulders  -       Forward head  RLE ROM: WFL  RLE Strength: WFL  LLE ROM: WFL  LLE Strength: WFL    Functional Mobility:  Bed Mobility:     Scooting: independence  Supine to Sit: independence  Transfers:     Sit to Stand:  independence with no AD  Bed to Chair: independence with  no AD  using  Stand Pivot  Gait: 200 ft x 2 independently with no AD, well paced, flexed posture, narrow JOSE GUADALUPE  Balance: good/fair plus    AM-PAC 6 CLICK MOBILITY  Total Score:21       Treatment and Education:  Educated pt purpose and objective of PT services while acute. Pt educated that given current functional presentation of independence, that they have no further acute PT needs. Pt in agreement.  Educated pt on use of nursing staff to assist with ambulation in hallways intermittently throughout day. Educated pt on call don't fall policy and use of call button to alert nursing staff of needs.      AM-PAC 6 CLICK MOBILITY  Total Score:21     Patient left up in chair with all lines intact, call button in reach, chair alarm on, and nursing notified.    GOALS:   Multidisciplinary Problems       Physical Therapy Goals          Problem: Physical Therapy    Goal Priority Disciplines Outcome Interventions   Physical Therapy Goal     PT, PT/OT     Description: No further PT at this time. Pt has no acute PT needs at this time. Pt performed all functional tasks independently.                         History:     Past Medical History:   Diagnosis Date    ADD (attention deficit disorder)     Anxiety     Back pain     Depression     Gout     Neuromuscular disorder        Past Surgical History:   Procedure Laterality Date    BACK SURGERY  10/13/2016    NECK SURGERY         Time Tracking:     PT Received On: 02/22/25  PT Start Time: 1218     PT Stop Time: 1239  PT Total Time  (min): 21 min     Billable Minutes: Evaluation 10 and Gait Training 11      02/22/2025

## 2025-02-22 NOTE — PROGRESS NOTES
[facility] ([facility] [place] [facility] Medicine  Progress Note    Patient Name: Octavio Pang  MRN: [id number]  Patient Class: IP- Inpatient   Admission Date: 2/18/2025  Length of Stay: 4 days  Attending Physician: Shelli Waterman,*  Primary Care Provider: Herman Quan MD    Subjective     Principal Problem:Spontaneous pneumothorax    HPI:  59-year-old male with a known past medical history which includes ADHD, anxiety, depression, back pain, and tobacco abuse (reports less than 1 ppd) since age of 18 that presented to his PCP for evaluation of worsening shortness of breath over the past couple of months.  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  Patient underwent chest x-ray and he reports a few hours later he was called and told to go to the ER as he had bilateral pneumothoraces and concern for pneumonia on his chest x-ray.  At the time of arrival in the ER, patient underwent another x-ray which did reveal right greater than left bilateral pneumothoraces and a small bore chest tube was placed on the right.  Follow up x-rays with tiny left apical pneumothorax was still a moderate size right pneumothorax.  Patient was admitted to the ICU for close observation.  Patient remains on room air with adequate O2 sats.  He has no other complaints.  He is deemed stable for transfer out of the ICU to the floor in the morning of 2/19 for which Hospital Medicine was consulted to assume care.  CTS has been consulted for further evaluation and management of right pneumothorax.     At the time of my exam in the ICU, the pt is awake and alert on RA in NAD. He quite thin. He is able to provide history and answer questions. Right sided pigtail chest tube in place connected to suction, but float not activated -- have discussed with nursing to ensure he  is at -20 cm suction. Pt awaiting transfer to the floor.    Overview/Hospital Course:  2/19: transferred from the ICU to the floor with R CT in place to suction, on RA in NAD, CTS consulted, no new issues or c/o noted   2/20-- CXR showed Reduced right pneumothorax.  CTS planning for possible right VATS and chemical pleurodesis and any other indicated procedure on 02/21/2025.  Patient stated improvement in cough, denied acute events, resting comfortably, hemodynamically stable.    2/21-- patient scheduled for VATS per Cardiothoracic surgery today  2/22: POD 1 s/p R VATS mechanical and chemical pleurodesis. Doing well, still has coughing fits, intermittent SOB, but improving. Minimal pain.    Interval History: Sitting up in bed, endorses pain with deep breathing and coughing.    Review of Systems   Constitutional:  Positive for activity change and appetite change.   Respiratory:  Positive for cough and shortness of breath. Negative for chest tightness, wheezing and stridor.    Cardiovascular:  Negative for chest pain, palpitations and leg swelling.   Gastrointestinal:  Negative for abdominal pain, constipation, diarrhea, nausea and vomiting.     Objective:     Vital Signs (Most Recent):  Temp: 98.9 °F (37.2 °C) (02/22/25 1105)  Pulse: (!) 113 (02/22/25 1252)  Resp: 17 (02/22/25 1252)  BP: 108/65 (02/22/25 1105)  SpO2: (!) 94 % (02/22/25 1252) Vital Signs (24h Range):  Temp:  [97.6 °F (36.4 °C)-98.9 °F (37.2 °C)] 98.9 °F (37.2 °C)  Pulse:  [] 113  Resp:  [11-22] 17  SpO2:  [92 %-100 %] 94 %  BP: ()/(57-80) 108/65     Weight: 60.3 kg (132 lb 15 oz)  Body mass index is 20.21 kg/m².    Intake/Output Summary (Last 24 hours) at 2/22/2025 1350  Last data filed at 2/22/2025 0931  Gross per 24 hour   Intake 240 ml   Output 1432 ml   Net -1192 ml         Physical Exam  Constitutional:       Appearance: He is underweight. He is ill-appearing.   HENT:      Head: Normocephalic.      Nose: Nose normal.       Mouth/Throat:      Mouth: Mucous membranes are dry.   Eyes:      Pupils: Pupils are equal, round, and reactive to light.   Cardiovascular:      Rate and Rhythm: Regular rhythm. Tachycardia present.   Pulmonary:      Effort: No respiratory distress.      Breath sounds: Normal breath sounds.      Comments: Shallow breathing  R chest tube in place  Abdominal:      General: Bowel sounds are normal. There is no distension.      Palpations: Abdomen is soft.      Tenderness: There is no abdominal tenderness.   Musculoskeletal:         General: No swelling.      Cervical back: Neck supple.   Skin:     General: Skin is warm and dry.      Findings: Bruising present.   Neurological:      General: No focal deficit present.      Mental Status: He is alert and oriented to person, place, and time. Mental status is at baseline.      Motor: No weakness.             Significant Labs: All pertinent labs within the past 24 hours have been reviewed.    Significant Imaging: I have reviewed all pertinent imaging results/findings within the past 24 hours.    Assessment and Plan     * Spontaneous pneumothorax  -spontaneous bilateral pneumothoraces present on admit, right-greater-than-left  -continue right pigtail chest tube to -20 cm of suction   -CTS consulted further evaluation and recommendations  -patient on room air, goal O2 sat greater than 92%  -daily chest x-rays  -? underlying ILD v other given environmental exposures  -would benefit from establishing with pulm as an outpt as he has underlying lung disease and reports has no evaluation of it     2/22: s/p right VATS mechanical and chemical pleurodesis, right-sided chest tube in place to 20 cm of suction    Abnormal CT of the chest  - see plan for ptx      Anxiety  - PRN xanax  - supportive care      Tobacco smoker, 1 pack of cigarettes or less per day  - cessation education during admission, cessation provided this AM  - refusing nicotine patch currently, will add if pt would like  as some point         VTE Risk Mitigation (From admission, onward)           Ordered     enoxaparin injection 40 mg  Daily         02/18/25 2115                    Discharge Planning   GUILLE:      Code Status: Full Code   Medical Readiness for Discharge Date:   Discharge Plan A: Home Health        Eva Christianson NP  Department of Hospital Medicine   [facility] ([facility])

## 2025-02-22 NOTE — ASSESSMENT & PLAN NOTE
-spontaneous bilateral pneumothoraces present on admit, right-greater-than-left  -continue right pigtail chest tube to -20 cm of suction   -CTS consulted further evaluation and recommendations  -patient on room air, goal O2 sat greater than 92%  -daily chest x-rays  -? underlying ILD v other given environmental exposures  -would benefit from establishing with pulm as an outpt as he has underlying lung disease and reports has no evaluation of it     2/22: s/p right VATS mechanical and chemical pleurodesis, right-sided chest tube in place to 20 cm of suction

## 2025-02-22 NOTE — PLAN OF CARE
A232/A232 SOMMER Pang is a 59 y.o.male admitted on 2/18/2025 for Spontaneous pneumothorax   Code Status: Full Code MRN: [id number]   Review of patient's allergies indicates:   Allergen Reactions    Ancef [cefazolin] Other (See Comments)     Headaches and dizziness     Past Medical History:   Diagnosis Date    ADD (attention deficit disorder)     Anxiety     Back pain     Depression     Gout     Neuromuscular disorder       PRN meds    acetaminophen, 650 mg, Q4H PRN  albuterol-ipratropium, 3 mL, Q4H PRN  ALPRAZolam, 0.25 mg, BID PRN  [START ON 2/22/2025] bisacodyL, 10 mg, Daily PRN  influenza, 0.5 mL, vaccine x 1 dose  metoclopramide, 5 mg, Q6H PRN  morphine, 4 mg, Q4H PRN  ondansetron, 8 mg, Q8H PRN  oxyCODONE, 5 mg, Q4H PRN  pneumoc 20-tres conj-dip cr(PF), 0.5 mL, vaccine x 1 dose  senna-docusate 8.6-50 mg, 1 tablet, Daily PRN  sodium chloride 0.9%, 10 mL, PRN  sodium chloride 0.9%, 10 mL, PRN  sodium chloride 0.9%, 10 mL, PRN      Chart check completed. Will continue plan of care.      Orientation: oriented x 4  Juanita Coma Scale Score: 15     Lead Monitored: Lead II Rhythm: normal sinus rhythm    Cardiac/Telemetry Box Number: [id number]  VTE Core Measure: (SCDs) Sequential compression device initiated/maintained Last Bowel Movement: 02/18/25  Diet Adult Regular Standard Tray     Mundo Score: 23  Fall Risk Score: 9  Accucheck []   Freq?      Lines/Drains/Airways       Drain  Duration                  Chest Tube 02/18/25 2111 Tube - 1 Right 2 days         Chest Tube 02/21/25 1326 Tube - 1 Right Pleural 28 Fr. <1 day              Peripheral Intravenous Line  Duration                  Peripheral IV - Single Lumen 02/18/25 1650 18 G Anterior;Right Forearm 3 days         Peripheral IV - Single Lumen 02/21/25 1130 18 G 1 in No Anterior;Left Forearm <1 day

## 2025-02-22 NOTE — PROGRESS NOTES
Subjective:      Patient ID: Octavio Pang is a 59 y.o. male.    Chief Complaint: pneumothorax (X ray at pcp today. SOB x 2 months. )    HPI: History of Present Illness:  This 59-year-old male with a  smoker for lifelong has not seen a pulmonologist was seen in the eyebrow we will ER for a spontaneous pneumothorax on the right side was transferred for further management.  The patient had a cook catheter placed in the right chest and was connected to Pleur-evac.  The patient does not have any significant cardiorespiratory history.    Date of Procedure: 2/21/2025      Procedure: Procedure(s) (LRB):  VATS, WITH PLEURODESIS (Right)  BLOCK, NERVE, INTERCOSTAL, 2 OR MORE (Right)     Surgeons and Role:     * Tim Moss MD - Primary     Assisting Surgeon: None     Pre-Operative Diagnosis: Spontaneous pneumothorax [J93.83]     Post-Operative Diagnosis: Post-Op Diagnosis Codes:     * Spontaneous pneumothorax [J93.83]  02/22/2025 the patient is comfortable incentive spirometry more than 1000 cc chest tube no air leak draining serous fluid chest x-ray shows a sliver of pneumothorax on the right side with a severely destroyed emphysematous lung.      Review of patient's allergies indicates:   Allergen Reactions    Ancef [cefazolin] Other (See Comments)     Headaches and dizziness       Past Medical History:   Diagnosis Date    ADD (attention deficit disorder)     Anxiety     Back pain     Depression     Gout     Neuromuscular disorder        Family History   Problem Relation Name Age of Onset    Arthritis Father      Pulmonary fibrosis Mother         Social History[1]    Past Surgical History:   Procedure Laterality Date    BACK SURGERY  10/13/2016    NECK SURGERY         Review of Systems   Constitutional:  Negative for activity change and appetite change.   HENT:  Negative for dental problem, nosebleeds and sore throat.    Eyes:  Negative for discharge and visual disturbance.   Respiratory:  Negative  "for cough, chest tightness and stridor.    Cardiovascular:  Negative for leg swelling.   Gastrointestinal:  Negative for abdominal distention and abdominal pain.   Genitourinary:  Negative for difficulty urinating and dysuria.   Musculoskeletal:  Negative for arthralgias, back pain and joint swelling.   Allergic/Immunologic: Negative for environmental allergies.   Neurological:  Negative for dizziness, syncope and headaches.   Hematological:  Does not bruise/bleed easily.   Psychiatric/Behavioral:  Negative for behavioral problems.           Objective:   /66 (BP Location: Right arm, Patient Position: Lying)   Pulse 99   Temp 98.5 °F (36.9 °C) (Oral)   Resp 18   Ht 5' 8" (1.727 m)   Wt 60.3 kg (132 lb 15 oz)   SpO2 96%   BMI 20.21 kg/m²     X-Ray Chest AP Single View  Narrative: EXAMINATION:  XR CHEST 1 VIEW    CLINICAL HISTORY:  post operative; Other pneumothorax    TECHNIQUE:  Single frontal view of the chest was performed.    COMPARISON:  02/21/2025    FINDINGS:  Right-sided chest tube has been placed with the tip at the right apex.  A small pneumothorax is present superiorly with 4.6 cm of pleural space the right apex..    Other findings are unchanged.  Impression: As above    Electronically signed by: Star Vaz MD  Date:    02/21/2025  Time:    16:17  X-Ray chest AP portable  Narrative: EXAMINATION:  XR CHEST AP PORTABLE    CLINICAL HISTORY:  pneumothorax;    FINDINGS:  Comparison is made to February 20, 2025.    Small right-sided pneumothorax, decreased in size compared to prior exam.  There appears to be a right-sided pleural drainage catheter.  Pulmonary opacities are similar to previous exam.  ACDF.  No acute osseous finding  Impression: Small right pneumothorax, improved in size when compared to prior.    Electronically signed by: Jian Barrios  Date:    02/21/2025  Time:    10:12         Physical Exam  Vitals reviewed.   Constitutional:       Appearance: Normal appearance.   HENT:      " Head: Normocephalic and atraumatic.      Mouth/Throat:      Mouth: Mucous membranes are moist.   Eyes:      Extraocular Movements: Extraocular movements intact.   Cardiovascular:      Rate and Rhythm: Normal rate and regular rhythm.      Pulses: Normal pulses.      Heart sounds: Normal heart sounds.   Pulmonary:      Effort: Pulmonary effort is normal.      Breath sounds: Rhonchi and rales present.   Abdominal:      Palpations: Abdomen is soft.   Musculoskeletal:         General: Normal range of motion.      Cervical back: Normal range of motion and neck supple.   Skin:     General: Skin is warm.      Capillary Refill: Capillary refill takes less than 2 seconds.   Neurological:      General: No focal deficit present.      Mental Status: He is alert and oriented to person, place, and time.   Psychiatric:         Mood and Affect: Mood normal.         Assessment:     1. Spontaneous pneumothorax    2. SOB (shortness of breath)    3. Bilateral pneumothoraces    4. Cough, unspecified type    5. Chest tube in place    6. Tobacco smoker, 1 pack of cigarettes or less per day    7. Abnormal CT of the chest          Plan   Postop day 1 status post right VATS mechanical and chemical pleurodesis.  Neuro Pain control as needed with the morphine and oxycodone  Cardiovascular aspirin beta-blocker  Respiratory aggressive pulmonary toilet  Bronchodilators DuoNeb nebulizer as needed  Chest tube to -20 cm of suction  Aggressive pulmonary toilet incentive spirometry  Regular diet  DVT prophylaxis with the Lovenox and SCDs  Rest of the management by medical hospitalist team.          Tim Moss MD  [facility]  [place]       [1]   Social History  Socioeconomic History    Marital status: Single   Tobacco Use    Smoking status: Some Days     Current packs/day: 1.00     Types: Cigarettes    Smokeless tobacco: Never   Substance and Sexual Activity    Alcohol use: Yes     Comment: occassionally     Drug use: No      Social Drivers of Health     Financial Resource Strain: Medium Risk (2/18/2025)    Overall Financial Resource Strain (CARDIA)     Difficulty of Paying Living Expenses: Somewhat hard   Food Insecurity: No Food Insecurity (2/18/2025)    Hunger Vital Sign     Worried About Running Out of Food in the Last Year: Never true     Ran Out of Food in the Last Year: Never true   Transportation Needs: No Transportation Needs (8/19/2024)    Received from [employer] [facility] and Its Subsidiaries and Affiliates    PRAPARE - Transportation     Lack of Transportation (Medical): No     Lack of Transportation (Non-Medical): No   Physical Activity: Inactive (8/19/2024)    Received from [employer] [facility] and Its Subsidiaries and Affiliates    Exercise Vital Sign     Days of Exercise per Week: 0 days     Minutes of Exercise per Session: 0 min   Stress: Stress Concern Present (2/18/2025)    [employer] [place] [employer] - Occupational Stress Questionnaire     Feeling of Stress : To some extent   Housing Stability: Low Risk  (2/18/2025)    Housing Stability Vital Sign     Unable to Pay for Housing in the Last Year: No     Homeless in the Last Year: No

## 2025-02-22 NOTE — ANESTHESIA POSTPROCEDURE EVALUATION
Anesthesia Post Evaluation    Patient: Octavio Pang    Procedure(s) Performed: Procedure(s) (LRB):  VATS, WITH PLEURODESIS (Right)  BLOCK, NERVE, INTERCOSTAL, 2 OR MORE (Right)    Final Anesthesia Type: general      Patient location during evaluation: PACU  Patient participation: Yes- Able to Participate  Level of consciousness: awake and alert and oriented  Post-procedure vital signs: reviewed and stable  Pain management: adequate  Airway patency: patent  NICHELLE mitigation strategies: Verification of full reversal of neuromuscular block  PONV status at discharge: No PONV  Anesthetic complications: no      Cardiovascular status: blood pressure returned to baseline and hemodynamically stable  Respiratory status: unassisted  Hydration status: euvolemic  Follow-up not needed.              Vitals Value Taken Time   /71 02/21/25 14:39   Temp 36.6 °C (97.9 °F) 02/21/25 14:39   Pulse 88 02/21/25 14:39   Resp 20 02/21/25 14:39   SpO2 96 % 02/21/25 14:39         Event Time   Out of Recovery 02/21/2025 14:45:32         Pain/Danelle Score: Pain Rating Prior to Med Admin: 6 (2/21/2025  7:34 PM)  Pain Rating Post Med Admin: 2 (2/21/2025  3:05 PM)  Danelle Score: 10 (2/21/2025  2:30 PM)

## 2025-02-22 NOTE — PT/OT/SLP EVAL
"Occupational Therapy   Evaluation    Name: Octavio Pang  MRN: [id number]  Admitting Diagnosis: Spontaneous pneumothorax  Recent Surgery: Procedure(s) (LRB):  VATS, WITH PLEURODESIS (Right)  BLOCK, NERVE, INTERCOSTAL, 2 OR MORE (Right) 1 Day Post-Op    Recommendations:     Discharge Recommendations: Low Intensity Therapy  Discharge Equipment Recommendations:  bedside commode  Barriers to discharge:  None    Assessment:     Octavio Pang is a 59 y.o. male with a medical diagnosis of Spontaneous pneumothorax.  He presents with significant shortness of breath following minimal exertion. Performance deficits affecting function: impaired endurance, impaired balance, decreased safety awareness, impaired cardiopulmonary response to activity.      Rehab Prognosis: Fair; patient would benefit from acute skilled OT services to address these deficits and reach maximum level of function.       Plan:     Patient to be seen 2 x/week to address the above listed problems via self-care/home management, therapeutic activities, therapeutic exercises  Plan of Care Expires: 03/08/25  Plan of Care Reviewed with: patient    Subjective     Chief Complaint: "I'm ready to get moving."  Patient/Family Comments/goals: To return home.     Occupational Profile:  Living Environment: Lives alone in home with no steps to enter. Walk-in shower with seat.   Previous level of function: ind with ADLs,  IADLs  Roles and Routines: drives, on disability. 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   Equipment Used at Home: shower chair (Owns but does not use: cane, rolling walker)  Assistance upon Discharge: unknown    Pain/Comfort:  Pain Rating 1: 0/10    Patients cultural, spiritual, Confucianist conflicts given the current situation: no    Objective:     Communicated with: nurse prior to session.  Patient found HOB elevated with peripheral IV, chest tube upon OT entry to room.    General Precautions: Standard, fall  Orthopedic Precautions: N/A  Braces: N/A  Respiratory " Status: Room air    Occupational Performance:    Bed Mobility:    Patient completed Scooting/Bridging with supervision  Patient completed Supine to Sit with contact guard assistance for line mgmt    Functional Mobility/Transfers:  Patient completed Sit <> Stand Transfer with supervision  with  no assistive device   Patient completed Bed <> Chair Transfer using Step Transfer technique with supervision with no assistive device  Functional Mobility: Pt ambulated 2 x 200ft in mathew with c/o SOB for final 50 ft. Unable to speak and difficulty returning to chair d/t pain in chest and SOB. Safely returned to bedside chair and symptoms resolved in less than 5 minutes.     Activities of Daily Living:  Lower Body Dressing: supervision to don socks  Toileting: independence to use urinal    Cognitive/Visual Perceptual:  Cognitive/Psychosocial Skills:     -       Oriented to: Person, Place, Time, and Situation   -       Follows Commands/attention:Follows two-step commands  -       Safety awareness/insight to disability: impaired     Physical Exam:  Balance:    -       fair  Upper Extremity Range of Motion:     -       Right Upper Extremity: WNL  -       Left Upper Extremity: WNL  Upper Extremity Strength:    -       Right Upper Extremity: WNL  -       Left Upper Extremity: WNL    AMPAC 6 Click ADL:  AMPAC Total Score: 19    Treatment & Education:  Patient educated on role of OT in acute care and POC. Educated on call don't fall policy.     Patient left up in chair with all lines intact, call button in reach, and chair alarm on    GOALS:   Multidisciplinary Problems       Occupational Therapy Goals          Problem: Occupational Therapy    Goal Priority Disciplines Outcome Interventions   Occupational Therapy Goal     OT, PT/OT     Description: Goals to be met by: 3/8/25     Patient will increase functional independence with ADLs by performing:    LE dressing with independence.  UE Dressing with [place].  Toileting from  toilet with [place] for hygiene and clothing management.   Tolerates sitting edge of bed during functional activity with [place] in preparation for bathing.                         DME Justifications:  No DME recommended requiring DME justifications    History:     Past Medical History:   Diagnosis Date    ADD (attention deficit disorder)     Anxiety     Back pain     Depression     Gout     Neuromuscular disorder          Past Surgical History:   Procedure Laterality Date    BACK SURGERY  10/13/2016    NECK SURGERY         Time Tracking:     OT Date of Treatment: 02/22/25  OT Start Time: 1216  OT Stop Time: 1236  OT Total Time (min): 20 min    Billable Minutes:Evaluation 10  Therapeutic Activity 10    2/22/2025

## 2025-02-22 NOTE — SUBJECTIVE & OBJECTIVE
Interval History: Sitting up in bed, endorses pain with deep breathing and coughing.    Review of Systems   Constitutional:  Positive for activity change and appetite change.   Respiratory:  Positive for cough and shortness of breath. Negative for chest tightness, wheezing and stridor.    Cardiovascular:  Negative for chest pain, palpitations and leg swelling.   Gastrointestinal:  Negative for abdominal pain, constipation, diarrhea, nausea and vomiting.     Objective:     Vital Signs (Most Recent):  Temp: 98.9 °F (37.2 °C) (02/22/25 1105)  Pulse: (!) 113 (02/22/25 1252)  Resp: 17 (02/22/25 1252)  BP: 108/65 (02/22/25 1105)  SpO2: (!) 94 % (02/22/25 1252) Vital Signs (24h Range):  Temp:  [97.6 °F (36.4 °C)-98.9 °F (37.2 °C)] 98.9 °F (37.2 °C)  Pulse:  [] 113  Resp:  [11-22] 17  SpO2:  [92 %-100 %] 94 %  BP: ()/(57-80) 108/65     Weight: 60.3 kg (132 lb 15 oz)  Body mass index is 20.21 kg/m².    Intake/Output Summary (Last 24 hours) at 2/22/2025 1350  Last data filed at 2/22/2025 0931  Gross per 24 hour   Intake 240 ml   Output 1432 ml   Net -1192 ml         Physical Exam  Constitutional:       Appearance: He is underweight. He is ill-appearing.   HENT:      Head: Normocephalic.      Nose: Nose normal.      Mouth/Throat:      Mouth: Mucous membranes are dry.   Eyes:      Pupils: Pupils are equal, round, and reactive to light.   Cardiovascular:      Rate and Rhythm: Regular rhythm. Tachycardia present.   Pulmonary:      Effort: No respiratory distress.      Breath sounds: Normal breath sounds.      Comments: Shallow breathing  R chest tube in place  Abdominal:      General: Bowel sounds are normal. There is no distension.      Palpations: Abdomen is soft.      Tenderness: There is no abdominal tenderness.   Musculoskeletal:         General: No swelling.      Cervical back: Neck supple.   Skin:     General: Skin is warm and dry.      Findings: Bruising present.   Neurological:      General: No focal deficit  present.      Mental Status: He is alert and oriented to person, place, and time. Mental status is at baseline.      Motor: No weakness.             Significant Labs: All pertinent labs within the past 24 hours have been reviewed.    Significant Imaging: I have reviewed all pertinent imaging results/findings within the past 24 hours.

## 2025-02-22 NOTE — PLAN OF CARE
EVAL AND TX COMPLETED: facilitated bed mobility with independently, transfers with independently. Ambulated 200 ft x 2 independently with no AD, noted flexed posture that pt reported is at baseline due to pre-existing back issues. Recommend no further acute PT services

## 2025-02-22 NOTE — PLAN OF CARE
Pt oriented x4. Vital signs stable  Pt remained afebrile throughout this shift.   All meds administered per order.   Pt remained free of falls this shift.   Plan of care reviewed. Patient verbalizes understanding.   Pt moving/turning independently.   Bed in lowest position, upper side side rails up x 2, wheels locked  Call light in reach, bed alarm on.   Patient instructed to call staff for mobility/assistance.  Nonskid socks on when out of bed.  Patient education provided.  No further concerns voiced at this time.    Problem: Adult Inpatient Plan of Care  Goal: Plan of Care Review  Outcome: Progressing  Goal: Patient-Specific Goal (Individualized)  Outcome: Progressing  Goal: Absence of Hospital-Acquired Illness or Injury  Outcome: Progressing  Goal: Optimal Comfort and Wellbeing  Outcome: Progressing  Goal: Readiness for Transition of Care  Outcome: Progressing     Problem: Wound  Goal: Optimal Coping  Outcome: Progressing  Goal: Optimal Functional Ability  Outcome: Progressing  Goal: Absence of Infection Signs and Symptoms  Outcome: Progressing  Goal: Improved Oral Intake  Outcome: Progressing  Goal: Optimal Pain Control and Function  Outcome: Progressing  Goal: Skin Health and Integrity  Outcome: Progressing  Goal: Optimal Wound Healing  Outcome: Progressing     Problem: Fall Injury Risk  Goal: Absence of Fall and Fall-Related Injury  Outcome: Progressing

## 2025-02-23 LAB
ANION GAP SERPL CALC-SCNC: 9 MMOL/L (ref 8–16)
BASOPHILS # BLD AUTO: 0.06 K/UL (ref 0–0.2)
BASOPHILS NFR BLD: 0.7 % (ref 0–1.9)
BUN SERPL-MCNC: 18 MG/DL (ref 6–20)
CALCIUM SERPL-MCNC: 9.3 MG/DL (ref 8.7–10.5)
CHLORIDE SERPL-SCNC: 100 MMOL/L (ref 95–110)
CO2 SERPL-SCNC: 26 MMOL/L (ref 23–29)
CREAT SERPL-MCNC: 0.7 MG/DL (ref 0.5–1.4)
DIFFERENTIAL METHOD BLD: ABNORMAL
EOSINOPHIL # BLD AUTO: 0.2 K/UL (ref 0–0.5)
EOSINOPHIL NFR BLD: 2.5 % (ref 0–8)
ERYTHROCYTE [DISTWIDTH] IN BLOOD BY AUTOMATED COUNT: 13.2 % (ref 11.5–14.5)
EST. GFR  (NO RACE VARIABLE): >60 ML/MIN/1.73 M^2
GLUCOSE SERPL-MCNC: 94 MG/DL (ref 70–110)
HCT VFR BLD AUTO: 38.5 % (ref 40–54)
HGB BLD-MCNC: 12.5 G/DL (ref 14–18)
IMM GRANULOCYTES # BLD AUTO: 0.03 K/UL (ref 0–0.04)
IMM GRANULOCYTES NFR BLD AUTO: 0.3 % (ref 0–0.5)
LYMPHOCYTES # BLD AUTO: 1.2 K/UL (ref 1–4.8)
LYMPHOCYTES NFR BLD: 13.5 % (ref 18–48)
MAGNESIUM SERPL-MCNC: 1.7 MG/DL (ref 1.6–2.6)
MCH RBC QN AUTO: 30.3 PG (ref 27–31)
MCHC RBC AUTO-ENTMCNC: 32.5 G/DL (ref 32–36)
MCV RBC AUTO: 93 FL (ref 82–98)
MONOCYTES # BLD AUTO: 1.4 K/UL (ref 0.3–1)
MONOCYTES NFR BLD: 15.6 % (ref 4–15)
NEUTROPHILS # BLD AUTO: 6 K/UL (ref 1.8–7.7)
NEUTROPHILS NFR BLD: 67.4 % (ref 38–73)
NRBC BLD-RTO: 0 /100 WBC
PLATELET # BLD AUTO: 429 K/UL (ref 150–450)
PMV BLD AUTO: 8.5 FL (ref 9.2–12.9)
POTASSIUM SERPL-SCNC: 4.3 MMOL/L (ref 3.5–5.1)
RBC # BLD AUTO: 4.13 M/UL (ref 4.6–6.2)
SODIUM SERPL-SCNC: 135 MMOL/L (ref 136–145)
WBC # BLD AUTO: 8.86 K/UL (ref 3.9–12.7)

## 2025-02-23 PROCEDURE — 94640 AIRWAY INHALATION TREATMENT: CPT

## 2025-02-23 PROCEDURE — 25000242 PHARM REV CODE 250 ALT 637 W/ HCPCS: Performed by: STUDENT IN AN ORGANIZED HEALTH CARE EDUCATION/TRAINING PROGRAM

## 2025-02-23 PROCEDURE — 25000003 PHARM REV CODE 250: Performed by: THORACIC SURGERY (CARDIOTHORACIC VASCULAR SURGERY)

## 2025-02-23 PROCEDURE — 25000242 PHARM REV CODE 250 ALT 637 W/ HCPCS: Performed by: THORACIC SURGERY (CARDIOTHORACIC VASCULAR SURGERY)

## 2025-02-23 PROCEDURE — 63600175 PHARM REV CODE 636 W HCPCS: Performed by: STUDENT IN AN ORGANIZED HEALTH CARE EDUCATION/TRAINING PROGRAM

## 2025-02-23 PROCEDURE — 83735 ASSAY OF MAGNESIUM: CPT | Performed by: NURSE PRACTITIONER

## 2025-02-23 PROCEDURE — 99900035 HC TECH TIME PER 15 MIN (STAT)

## 2025-02-23 PROCEDURE — 63600175 PHARM REV CODE 636 W HCPCS: Performed by: NURSE PRACTITIONER

## 2025-02-23 PROCEDURE — 36415 COLL VENOUS BLD VENIPUNCTURE: CPT | Performed by: NURSE PRACTITIONER

## 2025-02-23 PROCEDURE — 85025 COMPLETE CBC W/AUTO DIFF WBC: CPT | Performed by: NURSE PRACTITIONER

## 2025-02-23 PROCEDURE — 63600175 PHARM REV CODE 636 W HCPCS: Performed by: THORACIC SURGERY (CARDIOTHORACIC VASCULAR SURGERY)

## 2025-02-23 PROCEDURE — 94761 N-INVAS EAR/PLS OXIMETRY MLT: CPT

## 2025-02-23 PROCEDURE — 80048 BASIC METABOLIC PNL TOTAL CA: CPT | Performed by: NURSE PRACTITIONER

## 2025-02-23 PROCEDURE — 25000003 PHARM REV CODE 250: Performed by: STUDENT IN AN ORGANIZED HEALTH CARE EDUCATION/TRAINING PROGRAM

## 2025-02-23 PROCEDURE — 21400001 HC TELEMETRY ROOM

## 2025-02-23 RX ORDER — OXYCODONE HYDROCHLORIDE 5 MG/1
10 TABLET ORAL EVERY 4 HOURS PRN
Refills: 0 | Status: DISCONTINUED | OUTPATIENT
Start: 2025-02-23 | End: 2025-02-25 | Stop reason: HOSPADM

## 2025-02-23 RX ORDER — IPRATROPIUM BROMIDE 0.5 MG/2.5ML
0.5 SOLUTION RESPIRATORY (INHALATION) EVERY 6 HOURS
Status: DISCONTINUED | OUTPATIENT
Start: 2025-02-23 | End: 2025-02-25 | Stop reason: HOSPADM

## 2025-02-23 RX ADMIN — OXYCODONE HYDROCHLORIDE 5 MG: 5 TABLET ORAL at 10:02

## 2025-02-23 RX ADMIN — OXYCODONE HYDROCHLORIDE 5 MG: 5 TABLET ORAL at 02:02

## 2025-02-23 RX ADMIN — FAMOTIDINE 20 MG: 20 TABLET ORAL at 08:02

## 2025-02-23 RX ADMIN — IPRATROPIUM BROMIDE 0.5 MG: 0.5 SOLUTION RESPIRATORY (INHALATION) at 02:02

## 2025-02-23 RX ADMIN — IPRATROPIUM BROMIDE 0.5 MG: 0.5 SOLUTION RESPIRATORY (INHALATION) at 07:02

## 2025-02-23 RX ADMIN — VANCOMYCIN HYDROCHLORIDE 1000 MG: 1 INJECTION, POWDER, LYOPHILIZED, FOR SOLUTION INTRAVENOUS at 04:02

## 2025-02-23 RX ADMIN — MUPIROCIN 1 G: 20 OINTMENT TOPICAL at 10:02

## 2025-02-23 RX ADMIN — GUAIFENESIN AND DEXTROMETHORPHAN HYDROBROMIDE 1 TABLET: 600; 30 TABLET, EXTENDED RELEASE ORAL at 08:02

## 2025-02-23 RX ADMIN — MORPHINE SULFATE 4 MG: 4 INJECTION INTRAVENOUS at 08:02

## 2025-02-23 RX ADMIN — SODIUM CHLORIDE 250 ML: 9 INJECTION, SOLUTION INTRAVENOUS at 10:02

## 2025-02-23 RX ADMIN — ENOXAPARIN SODIUM 40 MG: 40 INJECTION SUBCUTANEOUS at 05:02

## 2025-02-23 RX ADMIN — OXYCODONE HYDROCHLORIDE 10 MG: 5 TABLET ORAL at 02:02

## 2025-02-23 RX ADMIN — MUPIROCIN 1 G: 20 OINTMENT TOPICAL at 08:02

## 2025-02-23 RX ADMIN — SENNOSIDES AND DOCUSATE SODIUM 1 TABLET: 50; 8.6 TABLET ORAL at 02:02

## 2025-02-23 NOTE — PLAN OF CARE
A232/A232 SOMMER Pang is a 59 y.o.male admitted on 2/18/2025 for Spontaneous pneumothorax   Code Status: Full Code MRN: [id number]   Review of patient's allergies indicates:   Allergen Reactions    Ancef [cefazolin] Other (See Comments)     Headaches and dizziness     Past Medical History:   Diagnosis Date    ADD (attention deficit disorder)     Anxiety     Back pain     Depression     Gout     Neuromuscular disorder       PRN meds    acetaminophen, 650 mg, Q4H PRN  albuterol-ipratropium, 3 mL, Q4H PRN  ALPRAZolam, 0.25 mg, BID PRN  bisacodyL, 10 mg, Daily PRN  influenza, 0.5 mL, vaccine x 1 dose  metoclopramide, 5 mg, Q6H PRN  morphine, 4 mg, Q4H PRN  ondansetron, 8 mg, Q8H PRN  oxyCODONE, 5 mg, Q4H PRN  pneumoc 20-tres conj-dip cr(PF), 0.5 mL, vaccine x 1 dose  senna-docusate 8.6-50 mg, 1 tablet, Daily PRN  sodium chloride 0.9%, 10 mL, PRN  sodium chloride 0.9%, 10 mL, PRN  sodium chloride 0.9%, 10 mL, PRN      Chart check completed. Will continue plan of care.      Orientation: oriented x 4  Juanita Coma Scale Score: 15     Lead Monitored: Lead II Rhythm: sinus tachycardia    Cardiac/Telemetry Box Number: [id number]  VTE Core Measure: Pharmacological prophylaxis initiated/maintained Last Bowel Movement: 02/18/25  Diet Adult Regular Standard Tray     Mundo Score: 21  Fall Risk Score: 11  Accucheck []   Freq?      Lines/Drains/Airways       Drain  Duration                  Chest Tube 02/18/25 2111 Tube - 1 Right 3 days         Chest Tube 02/21/25 1326 Tube - 1 Right Pleural 28 Fr. 1 day              Peripheral Intravenous Line  Duration                  Peripheral IV - Single Lumen 02/18/25 1650 18 G Anterior;Right Forearm 4 days         Peripheral IV - Single Lumen 02/21/25 1130 18 G 1 in No Anterior;Left Forearm 1 day

## 2025-02-23 NOTE — ASSESSMENT & PLAN NOTE
-spontaneous bilateral pneumothoraces present on admit, right-greater-than-left  -continue right pigtail chest tube to -20 cm of suction   -CTS consulted further evaluation and recommendations  -patient on room air, goal O2 sat greater than 92%  -daily chest x-rays  -? underlying ILD v other given environmental exposures  -would benefit from establishing with pulm as an outpt as he has underlying lung disease and reports has no evaluation of it     2/22: s/p right VATS mechanical and chemical pleurodesis, right-sided chest tube in place to 20 cm of suction    2/23- POD 2 s/p R VATS; chest tube draining serosanguineous fluid no air leak; CXR per CTS ordered; encouraged IS;

## 2025-02-23 NOTE — PROGRESS NOTES
[facility] ([facility] Medicine  Progress Note    Patient Name: Octavio Pang  MRN: [id number]  Patient Class: IP- Inpatient   Admission Date: 2/18/2025  Length of Stay: 5 days  Attending Physician: Shelli Waterman,*  Primary Care Provider: Herman Quan MD        Subjective     Principal Problem:Spontaneous pneumothorax        HPI:  59-year-old male with a known past medical history which includes ADHD, anxiety, depression, back pain, and tobacco abuse (reports less than 1 ppd) since age of 18 that presented to his PCP for evaluation of worsening shortness of breath over the past couple of months.  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  Patient underwent chest x-ray and he reports a few hours later he was called and told to go to the ER as he had bilateral pneumothoraces and concern for pneumonia on his chest x-ray.  At the time of arrival in the ER, patient underwent another x-ray which did reveal right greater than left bilateral pneumothoraces and a small bore chest tube was placed on the right.  Follow up x-rays with tiny left apical pneumothorax was still a moderate size right pneumothorax.  Patient was admitted to the ICU for close observation.  Patient remains on room air with adequate O2 sats.  He has no other complaints.  He is deemed stable for transfer out of the ICU to the floor in the morning of 2/19 for which Hospital Medicine was consulted to assume care.  CTS has been consulted for further evaluation and management of right pneumothorax.     At the time of my exam in the ICU, the pt is awake and alert on RA in NAD. He quite thin. He is able to provide history and answer questions. Right sided pigtail chest tube in place connected to suction, but float not activated -- have discussed with nursing to  ensure he is at -20 cm suction. Pt awaiting transfer to the floor.    Overview/Hospital Course:  2/19: transferred from the ICU to the floor with R CT in place to suction, on RA in NAD, CTS consulted, no new issues or c/o noted   2/20-- CXR showed Reduced right pneumothorax.  CTS planning for possible right VATS and chemical pleurodesis and any other indicated procedure on 02/21/2025.  Patient stated improvement in cough, denied acute events, resting comfortably, hemodynamically stable.    2/21-- patient scheduled for VATS per Cardiothoracic surgery today  2/22: POD 1 s/p R VATS mechanical and chemical pleurodesis. Doing well, still has coughing fits, intermittent SOB, but improving. Minimal pain.  2/23- POD 2 s/p R VATS; chest tube draining serosanguineous fluid no air leak; CXR per CTS ordered; encouraged IS;         Interval History:     No acute events overnight   Resting comfortably   Hemodynamically stable   Labs reviewed    Review of Systems      Constitutional:  Positive for activity change and appetite change.   Respiratory:  Positive for cough (improving). Negative for chest tightness, wheezing and stridor.    Cardiovascular:  Negative for chest pain, palpitations and leg swelling.   Gastrointestinal:  Negative for abdominal pain, constipation, diarrhea, nausea and vomiting.       Objective:     Vital Signs (Most Recent):  Temp: 97.7 °F (36.5 °C) (02/23/25 1102)  Pulse: 104 (02/23/25 1102)  Resp: 19 (02/23/25 1102)  BP: 116/67 (02/23/25 1102)  SpO2: 96 % (02/23/25 1102) Vital Signs (24h Range):  Temp:  [97.6 °F (36.4 °C)-99.5 °F (37.5 °C)] 97.7 °F (36.5 °C)  Pulse:  [] 104  Resp:  [14-20] 19  SpO2:  [94 %-98 %] 96 %  BP: ()/(56-67) 116/67     Weight: 59.8 kg (131 lb 13.4 oz)  Body mass index is 20.05 kg/m².    Intake/Output Summary (Last 24 hours) at 2/23/2025 1310  Last data filed at 2/23/2025 0721  Gross per 24 hour   Intake 488 ml   Output 1015 ml   Net -527 ml         Physical Exam       Constitutional:       Appearance: He is underweight.   HENT:      Head: Normocephalic.      Nose: Nose normal.      Mouth/Throat:      Mouth: Mucous membranes are dry.   Eyes:      Pupils: Pupils are equal, round, and reactive to light.   Cardiovascular:      Rate and Rhythm: Regular rhythm. Tachycardia present.   Pulmonary:      Effort: No respiratory distress.      Breath sounds: Normal breath sounds.      Comments: Shallow breathing  R chest tube in place  Abdominal:      General: Bowel sounds are normal. There is no distension.      Palpations: Abdomen is soft.      Tenderness: There is no abdominal tenderness.   Musculoskeletal:         General: No swelling.      Cervical back: Neck supple.   Skin:     General: Skin is warm and dry.      Neurological:      General: No focal deficit present.      Mental Status: He is alert and oriented to person, place, and time. Mental status is at baseline.      Motor: No weakness.      Significant Labs: All pertinent labs within the past 24 hours have been reviewed.  CBC:   Recent Labs   Lab 02/22/25  0530 02/23/25  0529   WBC 12.76*  12.76* 8.86   HGB 13.1*  13.1* 12.5*   HCT 39.7*  39.7* 38.5*   *  462* 429     CMP:   Recent Labs   Lab 02/21/25  1918 02/22/25  0530 02/23/25  0528   * 135* 135*   K 4.6 4.3 4.3   CL 98 99 100   CO2 25 26 26   * 102 94   BUN 15 20 18   CREATININE 0.7 0.8 0.7   CALCIUM 9.5 9.6 9.3   ANIONGAP 10 10 9       Significant Imaging:     Imaging Results              CT Chest With Contrast (Final result)  Result time 02/18/25 19:22:32      Final result by Blanco Page MD (02/18/25 19:22:32)                   Impression:      As above    All CT scans   are performed using dose optimization techniques including the following: automated exposure control; adjustment of the mA and/or kV; use of iterative reconstruction technique.  Dose modulation was employed for ALARA by means of: Automated exposure control; adjustment of the mA  and/or kV according to patient size (this includes techniques or standardized protocols for targeted exams where dose is matched to indication/reason for exam; i.e. extremities or head); and/or use of iterative reconstructive technique.      Electronically signed by: Blanco Page  Date:    02/18/2025  Time:    19:22               Narrative:    EXAMINATION:  CT CHEST WITH CONTRAST    CLINICAL HISTORY:  Soft tissue mass, chest, US/xray nondiagnostic;    TECHNIQUE:  Axial images through the chest were obtained after the IV administration of 75 mL Omnipaque 350. Coronal and sagittal images obtained.    COMPARISON:  No prior chest CT available    FINDINGS:  Tiny left apical pneumothorax.  Mild moderate right-sided pneumothorax.  Mild moderate subpleural reticular and subpleural opacity in the right greater than left lung.  Anterior right pleural drainage catheter is identified.  Heart is not enlarged.  No aneurysm.  No pulmonary embolism.  Mild right sided pleural effusion.  Slight left-sided pleural effusion.  Coronary artery calcification.  Mediastinal lymph nodes behind the left atrium suspected.  Correlate clinically for pneumonia.                                       X-Ray Chest AP Portable (Final result)  Result time 02/18/25 18:21:22      Final result by Blanco Page MD (02/18/25 18:21:22)                   Impression:      As above      Electronically signed by: Blanco Page  Date:    02/18/2025  Time:    18:21               Narrative:    EXAMINATION:  XR CHEST AP PORTABLE    CLINICAL HISTORY:  Presence of other specified functional implants    TECHNIQUE:  Single frontal view of the chest was performed.    COMPARISON:  None    FINDINGS:  Redemonstration of right-sided pneumothorax, questionably slightly smaller.  Coarse interstitial and alveolar markings in the bilateral lungs.  Trace left apical pneumothorax.  Otherwise similar findings to prior exam.  Cervical hardware.                                        X-Ray Chest AP Portable (Final result)  Result time 02/18/25 16:40:00      Final result by Blanco Page MD (02/18/25 16:40:00)                   Impression:      Stable exam      Electronically signed by: Blanco Page  Date:    02/18/2025  Time:    16:40               Narrative:    EXAMINATION:  XR CHEST AP PORTABLE    CLINICAL HISTORY:  Pneumothorax;    TECHNIQUE:  Single frontal view of the chest was performed.    COMPARISON:  Prior dated 02/18/2025 at 09:50    FINDINGS:  Right-sided pneumothorax unchanged since the prior exam.  Coarse interstitial alveolar remain.  Cervical hardware.    Bones are intact.                                       Assessment and Plan     * Spontaneous pneumothorax  -spontaneous bilateral pneumothoraces present on admit, right-greater-than-left  -continue right pigtail chest tube to -20 cm of suction   -CTS consulted further evaluation and recommendations  -patient on room air, goal O2 sat greater than 92%  -daily chest x-rays  -? underlying ILD v other given environmental exposures  -would benefit from establishing with pulm as an outpt as he has underlying lung disease and reports has no evaluation of it     2/22: s/p right VATS mechanical and chemical pleurodesis, right-sided chest tube in place to 20 cm of suction    2/23- POD 2 s/p R VATS; chest tube draining serosanguineous fluid no air leak; CXR per CTS ordered; encouraged IS;     Abnormal CT of the chest  - see plan for ptx      Anxiety  - PRN xanax  - supportive care      Tobacco smoker, 1 pack of cigarettes or less per day  - cessation education during admission, cessation provided this AM  - refusing nicotine patch currently, will add if pt would like as some point         VTE Risk Mitigation (From admission, onward)           Ordered     enoxaparin injection 40 mg  Daily         02/18/25 2115                    Discharge Planning   GUILLE:      Code Status: Full Code   Medical Readiness for Discharge Date:   Discharge  Plan A: Home Health                Please place Justification for DME        Shelli Waterman MD  Department of Hospital Medicine   [facility] - Telemetry ([facility])

## 2025-02-23 NOTE — PROGRESS NOTES
Subjective:      Patient ID: Octavio Pang is a 59 y.o. male.    Chief Complaint: pneumothorax (X ray at pcp today. SOB x 2 months. )    HPI: History of Present Illness:  This 59-year-old male with a  smoker for lifelong has not seen a pulmonologist was seen in the eyebrow we will ER for a spontaneous pneumothorax on the right side was transferred for further management.  The patient had a cook catheter placed in the right chest and was connected to Pleur-evac.  The patient does not have any significant cardiorespiratory history.    Date of Procedure: 2/21/2025      Procedure: Procedure(s) (LRB):  VATS, WITH PLEURODESIS (Right)  BLOCK, NERVE, INTERCOSTAL, 2 OR MORE (Right)     Surgeons and Role:     * Tim Moss MD - Primary     Assisting Surgeon: None     Pre-Operative Diagnosis: Spontaneous pneumothorax [J93.83]     Post-Operative Diagnosis: Post-Op Diagnosis Codes:     * Spontaneous pneumothorax [J93.83]  02/22/2025 the patient is comfortable incentive spirometry more than 1000 cc chest tube no air leak draining serous fluid chest x-ray shows a sliver of pneumothorax on the right side with a severely destroyed emphysematous lung.    02/23/2025 the patient is comfortable chest tube draining serosanguineous fluid no air leak pain well controlled.      Review of patient's allergies indicates:   Allergen Reactions    Ancef [cefazolin] Other (See Comments)     Headaches and dizziness       Past Medical History:   Diagnosis Date    ADD (attention deficit disorder)     Anxiety     Back pain     Depression     Gout     Neuromuscular disorder        Family History   Problem Relation Name Age of Onset    Arthritis Father      Pulmonary fibrosis Mother         Social History[1]    Past Surgical History:   Procedure Laterality Date    BACK SURGERY  10/13/2016    NECK SURGERY         Review of Systems   Constitutional:  Negative for activity change and appetite change.   HENT:  Negative for dental  "problem, nosebleeds and sore throat.    Eyes:  Negative for discharge and visual disturbance.   Respiratory:  Negative for cough, chest tightness and stridor.    Cardiovascular:  Negative for leg swelling.   Gastrointestinal:  Negative for abdominal distention and abdominal pain.   Genitourinary:  Negative for difficulty urinating and dysuria.   Musculoskeletal:  Negative for arthralgias, back pain and joint swelling.   Allergic/Immunologic: Negative for environmental allergies.   Neurological:  Negative for dizziness, syncope and headaches.   Hematological:  Does not bruise/bleed easily.   Psychiatric/Behavioral:  Negative for behavioral problems.           Objective:   /67 (BP Location: Right arm, Patient Position: Lying)   Pulse 104   Temp 97.7 °F (36.5 °C) (Axillary)   Resp 19   Ht 5' 8" (1.727 m)   Wt 59.8 kg (131 lb 13.4 oz)   SpO2 96%   BMI 20.05 kg/m²     X-Ray Chest AP Single View  Narrative: EXAMINATION:  XR CHEST 1 VIEW    CLINICAL HISTORY:  post operative; Other pneumothorax    TECHNIQUE:  Single frontal view of the chest was performed.    COMPARISON:  02/21/2025    FINDINGS:  Right-sided chest tube has been placed with the tip at the right apex.  A small pneumothorax is present superiorly with 4.6 cm of pleural space the right apex..    Other findings are unchanged.  Impression: As above    Electronically signed by: Star Vaz MD  Date:    02/21/2025  Time:    16:17  X-Ray chest AP portable  Narrative: EXAMINATION:  XR CHEST AP PORTABLE    CLINICAL HISTORY:  pneumothorax;    FINDINGS:  Comparison is made to February 20, 2025.    Small right-sided pneumothorax, decreased in size compared to prior exam.  There appears to be a right-sided pleural drainage catheter.  Pulmonary opacities are similar to previous exam.  ACDF.  No acute osseous finding  Impression: Small right pneumothorax, improved in size when compared to prior.    Electronically signed by: Jian " Alisaris  Date:    02/21/2025  Time:    10:12         Physical Exam  Vitals reviewed.   Constitutional:       Appearance: Normal appearance.   HENT:      Head: Normocephalic and atraumatic.      Mouth/Throat:      Mouth: Mucous membranes are moist.   Eyes:      Extraocular Movements: Extraocular movements intact.   Cardiovascular:      Rate and Rhythm: Normal rate and regular rhythm.      Pulses: Normal pulses.      Heart sounds: Normal heart sounds.   Pulmonary:      Effort: Pulmonary effort is normal.      Breath sounds: Rhonchi and rales present.   Abdominal:      Palpations: Abdomen is soft.   Musculoskeletal:         General: Normal range of motion.      Cervical back: Normal range of motion and neck supple.   Skin:     General: Skin is warm.      Capillary Refill: Capillary refill takes less than 2 seconds.   Neurological:      General: No focal deficit present.      Mental Status: He is alert and oriented to person, place, and time.   Psychiatric:         Mood and Affect: Mood normal.         Assessment:     1. Spontaneous pneumothorax    2. SOB (shortness of breath)    3. Bilateral pneumothoraces    4. Cough, unspecified type    5. Chest tube in place    6. Tobacco smoker, 1 pack of cigarettes or less per day    7. Abnormal CT of the chest          Plan   Postop day 2 status post right VATS mechanical and chemical pleurodesis.  Neuro Pain control as needed with the morphine and oxycodone  Cardiovascular aspirin beta-blocker  Respiratory aggressive pulmonary toilet  Bronchodilators DuoNeb nebulizer as needed  Chest tube to -20 cm of suction  Aggressive pulmonary toilet incentive spirometry  Regular diet  DVT prophylaxis with the Lovenox and SCDs  Rest of the management by medical hospitalist team.          Tim Moss MD  [facility]  [place]       [1]   Social History  Socioeconomic History    Marital status: Single   Tobacco Use    Smoking status: Some Days     Current packs/day:  1.00     Types: Cigarettes    Smokeless tobacco: Never   Substance and Sexual Activity    Alcohol use: Yes     Comment: occassionally     Drug use: No     Social Drivers of Health     Financial Resource Strain: Medium Risk (2/18/2025)    Overall Financial Resource Strain (CARDIA)     Difficulty of Paying Living Expenses: Somewhat hard   Food Insecurity: No Food Insecurity (2/18/2025)    Hunger Vital Sign     Worried About Running Out of Food in the Last Year: Never true     Ran Out of Food in the Last Year: Never true   Transportation Needs: No Transportation Needs (8/19/2024)    Received from [employer] [facility] and [facility] Subsidiaries and Affiliates    PRAPARE - Transportation     Lack of Transportation (Medical): No     Lack of Transportation (Non-Medical): No   Physical Activity: Inactive (8/19/2024)    [facility] [employer] [facility]    Exercise Vital Sign     Days of Exercise per Week: 0 days     Minutes of Exercise per Session: 0 min   Stress: Stress Concern Present (2/18/2025)    [employer] - Occupational Stress Questionnaire     Feeling of Stress : To some extent   Housing Stability: Low Risk  (2/18/2025)    Housing Stability Vital Sign     Unable to Pay for Housing in the Last Year: No     Homeless in the Last Year: No

## 2025-02-23 NOTE — PLAN OF CARE
Pt oriented x4. Vital signs stable  Pt remained afebrile throughout this shift.   All meds administered per order.   Pt remained free of falls this shift.   Plan of care reviewed. Patient verbalizes understanding.   Pt moving/turning independently.   Bed in lowest position, upper side side rails up x 2, wheels locked  Call light in reach, bed alarm on. Family at bed side.  Patient instructed to call staff for mobility/assistance.  Nonskid socks on when out of bed.  Patient education provided.  No further concerns voiced at this time.    Problem: Adult Inpatient Plan of Care  Goal: Plan of Care Review  Outcome: Progressing  Goal: Patient-Specific Goal (Individualized)  Outcome: Progressing  Goal: Absence of Hospital-Acquired Illness or Injury  Outcome: Progressing  Goal: Optimal Comfort and Wellbeing  Outcome: Progressing  Goal: Readiness for Transition of Care  Outcome: Progressing     Problem: Wound  Goal: Optimal Coping  Outcome: Progressing  Goal: Optimal Functional Ability  Outcome: Progressing  Goal: Absence of Infection Signs and Symptoms  Outcome: Progressing  Goal: Improved Oral Intake  Outcome: Progressing  Goal: Optimal Pain Control and Function  Outcome: Progressing  Goal: Skin Health and Integrity  Outcome: Progressing  Goal: Optimal Wound Healing  Outcome: Progressing     Problem: Fall Injury Risk  Goal: Absence of Fall and Fall-Related Injury  Outcome: Progressing

## 2025-02-23 NOTE — SUBJECTIVE & OBJECTIVE
Interval History:     No acute events overnight   Resting comfortably   Hemodynamically stable   Labs reviewed    Review of Systems      Constitutional:  Positive for activity change and appetite change.   Respiratory:  Positive for cough (improving). Negative for chest tightness, wheezing and stridor.    Cardiovascular:  Negative for chest pain, palpitations and leg swelling.   Gastrointestinal:  Negative for abdominal pain, constipation, diarrhea, nausea and vomiting.       Objective:     Vital Signs (Most Recent):  Temp: 97.7 °F (36.5 °C) (02/23/25 1102)  Pulse: 104 (02/23/25 1102)  Resp: 19 (02/23/25 1102)  BP: 116/67 (02/23/25 1102)  SpO2: 96 % (02/23/25 1102) Vital Signs (24h Range):  Temp:  [97.6 °F (36.4 °C)-99.5 °F (37.5 °C)] 97.7 °F (36.5 °C)  Pulse:  [] 104  Resp:  [14-20] 19  SpO2:  [94 %-98 %] 96 %  BP: ()/(56-67) 116/67     Weight: 59.8 kg (131 lb 13.4 oz)  Body mass index is 20.05 kg/m².    Intake/Output Summary (Last 24 hours) at 2/23/2025 1310  Last data filed at 2/23/2025 0721  Gross per 24 hour   Intake 488 ml   Output 1015 ml   Net -527 ml         Physical Exam      Constitutional:       Appearance: He is underweight.   HENT:      Head: Normocephalic.      Nose: Nose normal.      Mouth/Throat:      Mouth: Mucous membranes are dry.   Eyes:      Pupils: Pupils are equal, round, and reactive to light.   Cardiovascular:      Rate and Rhythm: Regular rhythm. Tachycardia present.   Pulmonary:      Effort: No respiratory distress.      Breath sounds: Normal breath sounds.      Comments: Shallow breathing  R chest tube in place  Abdominal:      General: Bowel sounds are normal. There is no distension.      Palpations: Abdomen is soft.      Tenderness: There is no abdominal tenderness.   Musculoskeletal:         General: No swelling.      Cervical back: Neck supple.   Skin:     General: Skin is warm and dry.      Neurological:      General: No focal deficit present.      Mental Status: He is  alert and oriented to person, place, and time. Mental status is at baseline.      Motor: No weakness.      Significant Labs: All pertinent labs within the past 24 hours have been reviewed.  CBC:   Recent Labs   Lab 02/22/25  0530 02/23/25  0529   WBC 12.76*  12.76* 8.86   HGB 13.1*  13.1* 12.5*   HCT 39.7*  39.7* 38.5*   *  462* 429     CMP:   Recent Labs   Lab 02/21/25  1918 02/22/25  0530 02/23/25  0528   * 135* 135*   K 4.6 4.3 4.3   CL 98 99 100   CO2 25 26 26   * 102 94   BUN 15 20 18   CREATININE 0.7 0.8 0.7   CALCIUM 9.5 9.6 9.3   ANIONGAP 10 10 9       Significant Imaging:     Imaging Results              CT Chest With Contrast (Final result)  Result time 02/18/25 19:22:32      Final result by Blanco Page MD (02/18/25 19:22:32)                   Impression:      As above    All CT scans   are performed using dose optimization techniques including the following: automated exposure control; adjustment of the mA and/or kV; use of iterative reconstruction technique.  Dose modulation was employed for ALARA by means of: Automated exposure control; adjustment of the mA and/or kV according to patient size (this includes techniques or standardized protocols for targeted exams where dose is matched to indication/reason for exam; i.e. extremities or head); and/or use of iterative reconstructive technique.      Electronically signed by: Blanco Page  Date:    02/18/2025  Time:    19:22               Narrative:    EXAMINATION:  CT CHEST WITH CONTRAST    CLINICAL HISTORY:  Soft tissue mass, chest, US/xray nondiagnostic;    TECHNIQUE:  Axial images through the chest were obtained after the IV administration of 75 mL Omnipaque 350. Coronal and sagittal images obtained.    COMPARISON:  No prior chest CT available    FINDINGS:  Tiny left apical pneumothorax.  Mild moderate right-sided pneumothorax.  Mild moderate subpleural reticular and subpleural opacity in the right greater than left lung.   Anterior right pleural drainage catheter is identified.  Heart is not enlarged.  No aneurysm.  No pulmonary embolism.  Mild right sided pleural effusion.  Slight left-sided pleural effusion.  Coronary artery calcification.  Mediastinal lymph nodes behind the left atrium suspected.  Correlate clinically for pneumonia.                                       X-Ray Chest AP Portable (Final result)  Result time 02/18/25 18:21:22      Final result by Blanco Page MD (02/18/25 18:21:22)                   Impression:      As above      Electronically signed by: Blanco Page  Date:    02/18/2025  Time:    18:21               Narrative:    EXAMINATION:  XR CHEST AP PORTABLE    CLINICAL HISTORY:  Presence of other specified functional implants    TECHNIQUE:  Single frontal view of the chest was performed.    COMPARISON:  None    FINDINGS:  Redemonstration of right-sided pneumothorax, questionably slightly smaller.  Coarse interstitial and alveolar markings in the bilateral lungs.  Trace left apical pneumothorax.  Otherwise similar findings to prior exam.  Cervical hardware.                                       X-Ray Chest AP Portable (Final result)  Result time 02/18/25 16:40:00      Final result by Blanco Page MD (02/18/25 16:40:00)                   Impression:      Stable exam      Electronically signed by: Blanco Page  Date:    02/18/2025  Time:    16:40               Narrative:    EXAMINATION:  XR CHEST AP PORTABLE    CLINICAL HISTORY:  Pneumothorax;    TECHNIQUE:  Single frontal view of the chest was performed.    COMPARISON:  Prior dated 02/18/2025 at 09:50    FINDINGS:  Right-sided pneumothorax unchanged since the prior exam.  Coarse interstitial alveolar remain.  Cervical hardware.    Bones are intact.

## 2025-02-24 LAB
ANION GAP SERPL CALC-SCNC: 11 MMOL/L (ref 8–16)
BACTERIA BLD CULT: NORMAL
BACTERIA BLD CULT: NORMAL
BASOPHILS # BLD AUTO: 0.09 K/UL (ref 0–0.2)
BASOPHILS NFR BLD: 1.1 % (ref 0–1.9)
BUN SERPL-MCNC: 15 MG/DL (ref 6–20)
CALCIUM SERPL-MCNC: 8.7 MG/DL (ref 8.7–10.5)
CHLORIDE SERPL-SCNC: 100 MMOL/L (ref 95–110)
CO2 SERPL-SCNC: 24 MMOL/L (ref 23–29)
CREAT SERPL-MCNC: 0.7 MG/DL (ref 0.5–1.4)
DIFFERENTIAL METHOD BLD: ABNORMAL
EOSINOPHIL # BLD AUTO: 0.7 K/UL (ref 0–0.5)
EOSINOPHIL NFR BLD: 8.2 % (ref 0–8)
ERYTHROCYTE [DISTWIDTH] IN BLOOD BY AUTOMATED COUNT: 13 % (ref 11.5–14.5)
EST. GFR  (NO RACE VARIABLE): >60 ML/MIN/1.73 M^2
GLUCOSE SERPL-MCNC: 99 MG/DL (ref 70–110)
HCT VFR BLD AUTO: 37.3 % (ref 40–54)
HGB BLD-MCNC: 12 G/DL (ref 14–18)
IMM GRANULOCYTES # BLD AUTO: 0.03 K/UL (ref 0–0.04)
IMM GRANULOCYTES NFR BLD AUTO: 0.4 % (ref 0–0.5)
LYMPHOCYTES # BLD AUTO: 1.1 K/UL (ref 1–4.8)
LYMPHOCYTES NFR BLD: 13.8 % (ref 18–48)
MAGNESIUM SERPL-MCNC: 2 MG/DL (ref 1.6–2.6)
MCH RBC QN AUTO: 29.9 PG (ref 27–31)
MCHC RBC AUTO-ENTMCNC: 32.2 G/DL (ref 32–36)
MCV RBC AUTO: 93 FL (ref 82–98)
MONOCYTES # BLD AUTO: 1.4 K/UL (ref 0.3–1)
MONOCYTES NFR BLD: 16.8 % (ref 4–15)
NEUTROPHILS # BLD AUTO: 4.8 K/UL (ref 1.8–7.7)
NEUTROPHILS NFR BLD: 59.7 % (ref 38–73)
NRBC BLD-RTO: 0 /100 WBC
PLATELET # BLD AUTO: 446 K/UL (ref 150–450)
PMV BLD AUTO: 8.6 FL (ref 9.2–12.9)
POTASSIUM SERPL-SCNC: 4.6 MMOL/L (ref 3.5–5.1)
RBC # BLD AUTO: 4.01 M/UL (ref 4.6–6.2)
SODIUM SERPL-SCNC: 135 MMOL/L (ref 136–145)
WBC # BLD AUTO: 8.04 K/UL (ref 3.9–12.7)

## 2025-02-24 PROCEDURE — 99900035 HC TECH TIME PER 15 MIN (STAT)

## 2025-02-24 PROCEDURE — 25000003 PHARM REV CODE 250: Performed by: FAMILY MEDICINE

## 2025-02-24 PROCEDURE — 25000003 PHARM REV CODE 250: Performed by: NURSE PRACTITIONER

## 2025-02-24 PROCEDURE — 25000003 PHARM REV CODE 250: Performed by: THORACIC SURGERY (CARDIOTHORACIC VASCULAR SURGERY)

## 2025-02-24 PROCEDURE — 25000003 PHARM REV CODE 250: Performed by: STUDENT IN AN ORGANIZED HEALTH CARE EDUCATION/TRAINING PROGRAM

## 2025-02-24 PROCEDURE — 63600175 PHARM REV CODE 636 W HCPCS: Performed by: THORACIC SURGERY (CARDIOTHORACIC VASCULAR SURGERY)

## 2025-02-24 PROCEDURE — 21400001 HC TELEMETRY ROOM

## 2025-02-24 PROCEDURE — 94761 N-INVAS EAR/PLS OXIMETRY MLT: CPT

## 2025-02-24 PROCEDURE — 94640 AIRWAY INHALATION TREATMENT: CPT

## 2025-02-24 PROCEDURE — 36415 COLL VENOUS BLD VENIPUNCTURE: CPT | Performed by: NURSE PRACTITIONER

## 2025-02-24 PROCEDURE — 85025 COMPLETE CBC W/AUTO DIFF WBC: CPT | Performed by: NURSE PRACTITIONER

## 2025-02-24 PROCEDURE — 63600175 PHARM REV CODE 636 W HCPCS: Performed by: NURSE PRACTITIONER

## 2025-02-24 PROCEDURE — 25000242 PHARM REV CODE 250 ALT 637 W/ HCPCS: Performed by: STUDENT IN AN ORGANIZED HEALTH CARE EDUCATION/TRAINING PROGRAM

## 2025-02-24 PROCEDURE — 83735 ASSAY OF MAGNESIUM: CPT | Performed by: NURSE PRACTITIONER

## 2025-02-24 PROCEDURE — 80048 BASIC METABOLIC PNL TOTAL CA: CPT | Performed by: NURSE PRACTITIONER

## 2025-02-24 RX ORDER — LIDOCAINE HYDROCHLORIDE 10 MG/ML
5 INJECTION, SOLUTION EPIDURAL; INFILTRATION; INTRACAUDAL; PERINEURAL ONCE
Status: COMPLETED | OUTPATIENT
Start: 2025-02-24 | End: 2025-02-24

## 2025-02-24 RX ADMIN — FAMOTIDINE 20 MG: 20 TABLET ORAL at 08:02

## 2025-02-24 RX ADMIN — MUPIROCIN 1 G: 20 OINTMENT TOPICAL at 08:02

## 2025-02-24 RX ADMIN — OXYCODONE HYDROCHLORIDE 10 MG: 5 TABLET ORAL at 01:02

## 2025-02-24 RX ADMIN — ENOXAPARIN SODIUM 40 MG: 40 INJECTION SUBCUTANEOUS at 06:02

## 2025-02-24 RX ADMIN — ALPRAZOLAM 0.25 MG: 0.25 TABLET ORAL at 02:02

## 2025-02-24 RX ADMIN — GUAIFENESIN AND DEXTROMETHORPHAN HYDROBROMIDE 1 TABLET: 600; 30 TABLET, EXTENDED RELEASE ORAL at 08:02

## 2025-02-24 RX ADMIN — SODIUM CHLORIDE 1000 ML: 9 INJECTION, SOLUTION INTRAVENOUS at 07:02

## 2025-02-24 RX ADMIN — IPRATROPIUM BROMIDE 0.5 MG: 0.5 SOLUTION RESPIRATORY (INHALATION) at 12:02

## 2025-02-24 RX ADMIN — IPRATROPIUM BROMIDE 0.5 MG: 0.5 SOLUTION RESPIRATORY (INHALATION) at 07:02

## 2025-02-24 RX ADMIN — OXYCODONE HYDROCHLORIDE 10 MG: 5 TABLET ORAL at 07:02

## 2025-02-24 RX ADMIN — POLYETHYLENE GLYCOL 3350 17 G: 17 POWDER, FOR SOLUTION ORAL at 08:02

## 2025-02-24 RX ADMIN — OXYCODONE HYDROCHLORIDE 10 MG: 5 TABLET ORAL at 11:02

## 2025-02-24 RX ADMIN — OXYCODONE HYDROCHLORIDE 10 MG: 5 TABLET ORAL at 06:02

## 2025-02-24 RX ADMIN — LIDOCAINE HYDROCHLORIDE 50 MG: 10 INJECTION, SOLUTION EPIDURAL; INFILTRATION; INTRACAUDAL at 12:02

## 2025-02-24 RX ADMIN — IPRATROPIUM BROMIDE 0.5 MG: 0.5 SOLUTION RESPIRATORY (INHALATION) at 08:02

## 2025-02-24 RX ADMIN — OXYCODONE HYDROCHLORIDE 10 MG: 5 TABLET ORAL at 03:02

## 2025-02-24 NOTE — ASSESSMENT & PLAN NOTE
-spontaneous bilateral pneumothoraces present on admit, right-greater-than-left  -continue right pigtail chest tube to -20 cm of suction   -CTS consulted further evaluation and recommendations  -patient on room air, goal O2 sat greater than 92%  -daily chest x-rays  -? underlying ILD v other given environmental exposures  -would benefit from establishing with pulm as an outpt as he has underlying lung disease and reports has no evaluation of it     2/22: s/p right VATS mechanical and chemical pleurodesis, right-sided chest tube in place to 20 cm of suction    2/23- POD 2 s/p R VATS; chest tube draining serosanguineous fluid no air leak; CXR per CTS ordered; encouraged IS;     2/24/2025  POD 3 vats  Discussed case with CTS   Will plan for Heimlich valve  Valve to be placed today   Will observe overnight   Possible DC tomorrow if stable

## 2025-02-24 NOTE — PROGRESS NOTES
[facility] Medicine  Progress Note    Patient Name: Octavio Pang  MRN: [id number]  Patient Class: IP- Inpatient   Admission Date: 2/18/2025  Length of Stay: 6 days  Attending Physician: Tai Clarke MD  Primary Care Provider: Herman Quan MD        Subjective     Principal Problem:Spontaneous pneumothorax        HPI:  59-year-old male with a known past medical history which includes ADHD, anxiety, depression, back pain, and tobacco abuse (reports less than 1 ppd) since age of 18 that presented to his PCP for evaluation of worsening shortness of breath over the past couple of months.  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  Patient underwent chest x-ray and he reports a few hours later he was called and told to go to the ER as he had bilateral pneumothoraces and concern for pneumonia on his chest x-ray.  At the time of arrival in the ER, patient underwent another x-ray which did reveal right greater than left bilateral pneumothoraces and a small bore chest tube was placed on the right.  Follow up x-rays with tiny left apical pneumothorax was still a moderate size right pneumothorax.  Patient was admitted to the ICU for close observation.  Patient remains on room air with adequate O2 sats.  He has no other complaints.  He is deemed stable for transfer out of the ICU to the floor in the morning of 2/19 for which Hospital Medicine was consulted to assume care.  CTS has been consulted for further evaluation and management of right pneumothorax.     At the time of my exam in the ICU, the pt is awake and alert on RA in NAD. He quite thin. He is able to provide history and answer questions. Right sided pigtail chest tube in place connected to suction, but float not activated -- have discussed with nursing to ensure he is at  -20 cm suction. Pt awaiting transfer to the floor.    Overview/Hospital Course:  2/19: transferred from the ICU to the floor with R CT in place to suction, on RA in NAD, CTS consulted, no new issues or c/o noted   2/20-- CXR showed Reduced right pneumothorax.  CTS planning for possible right VATS and chemical pleurodesis and any other indicated procedure on 02/21/2025.  Patient stated improvement in cough, denied acute events, resting comfortably, hemodynamically stable.    2/21-- patient scheduled for VATS per Cardiothoracic surgery today  2/22: POD 1 s/p R VATS mechanical and chemical pleurodesis. Doing well, still has coughing fits, intermittent SOB, but improving. Minimal pain.  2/23- POD 2 s/p R VATS; chest tube draining serosanguineous fluid no air leak; CXR per CTS ordered; encouraged IS;   02/24/2025  POD 3 s/p VATS, chest tube in place.  Discussed case with CTS.will plan to send the patient with a Heimlich valve home.  Valve to be placed today.  Will observe overnight.  DC tomorrow with home health if stable.    Interval History:  No acute issues overnight.    Review of Systems   Constitutional:  Negative for fatigue and fever.   HENT:  Negative for sinus pressure.    Eyes:  Negative for visual disturbance.   Respiratory:  Negative for shortness of breath.    Cardiovascular:  Negative for chest pain.   Gastrointestinal:  Negative for nausea and vomiting.   Genitourinary:  Negative for difficulty urinating.   Musculoskeletal:  Negative for back pain.   Skin:  Negative for rash.   Neurological:  Negative for headaches.   Psychiatric/Behavioral:  Negative for confusion.      Objective:     Vital Signs (Most Recent):  Temp: 98.4 °F (36.9 °C) (02/24/25 0734)  Pulse: 102 (02/24/25 0900)  Resp: 18 (02/24/25 0736)  BP: 102/62 (02/24/25 0734)  SpO2: 95 % (02/24/25 0736) Vital Signs (24h Range):  Temp:  [97.9 °F (36.6 °C)-99 °F (37.2 °C)] 98.4 °F (36.9 °C)  Pulse:  [] 102  Resp:  [14-20] 18  SpO2:  [94 %-99 %]  95 %  BP: (101-108)/(61-66) 102/62     Weight: 59 kg (130 lb 1.1 oz)  Body mass index is 19.78 kg/m².    Intake/Output Summary (Last 24 hours) at 2/24/2025 1132  Last data filed at 2/24/2025 0906  Gross per 24 hour   Intake 588.47 ml   Output 1355 ml   Net -766.53 ml         Physical Exam  Constitutional:       General: He is not in acute distress.     Appearance: He is well-developed. He is not diaphoretic.   HENT:      Head: Normocephalic and atraumatic.   Eyes:      Pupils: Pupils are equal, round, and reactive to light.   Cardiovascular:      Rate and Rhythm: Normal rate and regular rhythm.      Heart sounds: Normal heart sounds. No murmur heard.     No friction rub. No gallop.   Pulmonary:      Effort: Pulmonary effort is normal. No respiratory distress.      Breath sounds: Normal breath sounds. No stridor. No wheezing or rales.      Comments: Right-sided chest tube  Abdominal:      General: Bowel sounds are normal. There is no distension.      Palpations: Abdomen is soft. There is no mass.      Tenderness: There is no abdominal tenderness. There is no guarding.   Musculoskeletal:      Right lower leg: No edema.      Left lower leg: No edema.   Skin:     General: Skin is warm.      Findings: No erythema.   Neurological:      Mental Status: He is alert and oriented to person, place, and time.             Significant Labs: All pertinent labs within the past 24 hours have been reviewed.    Significant Imaging: I have reviewed all pertinent imaging results/findings within the past 24 hours.    Assessment and Plan     * Spontaneous pneumothorax  -spontaneous bilateral pneumothoraces present on admit, right-greater-than-left  -continue right pigtail chest tube to -20 cm of suction   -CTS consulted further evaluation and recommendations  -patient on room air, goal O2 sat greater than 92%  -daily chest x-rays  -? underlying ILD v other given environmental exposures  -would benefit from establishing with pulm as an  outpt as he has underlying lung disease and reports has no evaluation of it     2/22: s/p right VATS mechanical and chemical pleurodesis, right-sided chest tube in place to 20 cm of suction    2/23- POD 2 s/p R VATS; chest tube draining serosanguineous fluid no air leak; CXR per CTS ordered; encouraged IS;     2/24/2025  POD 3 vats  Discussed case with CTS   Will plan for Heimlich valve  Valve to be placed today   Will observe overnight   Possible DC tomorrow if stable    Abnormal CT of the chest  - see plan for ptx      Anxiety  - PRN xanax  - supportive care      Tobacco smoker, 1 pack of cigarettes or less per day  - cessation education during admission, cessation provided this AM  - refusing nicotine patch currently, will add if pt would like as some point         VTE Risk Mitigation (From admission, onward)           Ordered     enoxaparin injection 40 mg  Daily         02/18/25 2115                    Discharge Planning   GUILLE:      Code Status: Full Code   Medical Readiness for Discharge Date:   Discharge Plan A: Home Health                Tai Clarke MD  Department of Hospital Medicine   [facility] - Telemetry ([facility])

## 2025-02-24 NOTE — SUBJECTIVE & OBJECTIVE
Interval History:  No acute issues overnight.    Review of Systems   Constitutional:  Negative for fatigue and fever.   HENT:  Negative for sinus pressure.    Eyes:  Negative for visual disturbance.   Respiratory:  Negative for shortness of breath.    Cardiovascular:  Negative for chest pain.   Gastrointestinal:  Negative for nausea and vomiting.   Genitourinary:  Negative for difficulty urinating.   Musculoskeletal:  Negative for back pain.   Skin:  Negative for rash.   Neurological:  Negative for headaches.   Psychiatric/Behavioral:  Negative for confusion.      Objective:     Vital Signs (Most Recent):  Temp: 98.4 °F (36.9 °C) (02/24/25 0734)  Pulse: 102 (02/24/25 0900)  Resp: 18 (02/24/25 0736)  BP: 102/62 (02/24/25 0734)  SpO2: 95 % (02/24/25 0736) Vital Signs (24h Range):  Temp:  [97.9 °F (36.6 °C)-99 °F (37.2 °C)] 98.4 °F (36.9 °C)  Pulse:  [] 102  Resp:  [14-20] 18  SpO2:  [94 %-99 %] 95 %  BP: (101-108)/(61-66) 102/62     Weight: 59 kg (130 lb 1.1 oz)  Body mass index is 19.78 kg/m².    Intake/Output Summary (Last 24 hours) at 2/24/2025 1132  Last data filed at 2/24/2025 0906  Gross per 24 hour   Intake 588.47 ml   Output 1355 ml   Net -766.53 ml         Physical Exam  Constitutional:       General: He is not in acute distress.     Appearance: He is well-developed. He is not diaphoretic.   HENT:      Head: Normocephalic and atraumatic.   Eyes:      Pupils: Pupils are equal, round, and reactive to light.   Cardiovascular:      Rate and Rhythm: Normal rate and regular rhythm.      Heart sounds: Normal heart sounds. No murmur heard.     No friction rub. No gallop.   Pulmonary:      Effort: Pulmonary effort is normal. No respiratory distress.      Breath sounds: Normal breath sounds. No stridor. No wheezing or rales.      Comments: Right-sided chest tube  Abdominal:      General: Bowel sounds are normal. There is no distension.      Palpations: Abdomen is soft. There is no mass.      Tenderness: There is  no abdominal tenderness. There is no guarding.   Musculoskeletal:      Right lower leg: No edema.      Left lower leg: No edema.   Skin:     General: Skin is warm.      Findings: No erythema.   Neurological:      Mental Status: He is alert and oriented to person, place, and time.             Significant Labs: All pertinent labs within the past 24 hours have been reviewed.    Significant Imaging: I have reviewed all pertinent imaging results/findings within the past 24 hours.

## 2025-02-24 NOTE — PLAN OF CARE
POC reviewed with pt. Pt verbalizes understanding of POC. No questions at this time.  AAOx3. NADN.  Pain controlled after changed to oxycodone 10 mg.  Chest tube continued to water seal.  250 mL bolus given to normalize BP.  NSR on cardiac monitor.  Pt remains free of falls.  No complaints at this time.  Safety measures in place. Will continue to monitor.  Informed pt to call for assistance before getting up. Pt verbalizes understanding.  Hourly rounding and chart check complete.

## 2025-02-24 NOTE — PLAN OF CARE
02/24/25 1015   Rounds   Attendance Provider;Nurse ;Charge nurse;Physical therapist   Discharge Plan A Home Health   Why the patient remains in the hospital Requires continued medical care   Transition of Care Barriers None     S/p VATS, CT in place

## 2025-02-24 NOTE — PROGRESS NOTES
Subjective:      Patient ID: Octavio Pang is a 59 y.o. male.    Chief Complaint: pneumothorax (X ray at pcp today. SOB x 2 months. )    HPI: History of Present Illness:  This 59-year-old male with a  smoker for lifelong has not seen a pulmonologist was seen in the eyebrow we will ER for a spontaneous pneumothorax on the right side was transferred for further management.  The patient had a cook catheter placed in the right chest and was connected to Pleur-evac.  The patient does not have any significant cardiorespiratory history.    Date of Procedure: 2/21/2025      Procedure: Procedure(s) (LRB):  VATS, WITH PLEURODESIS (Right)  BLOCK, NERVE, INTERCOSTAL, 2 OR MORE (Right)     Surgeons and Role:     * Tim Moss MD - Primary     Assisting Surgeon: None     Pre-Operative Diagnosis: Spontaneous pneumothorax [J93.83]     Post-Operative Diagnosis: Post-Op Diagnosis Codes:     * Spontaneous pneumothorax [J93.83]  02/22/2025 the patient is comfortable incentive spirometry more than 1000 cc chest tube no air leak draining serous fluid chest x-ray shows a sliver of pneumothorax on the right side with a severely destroyed emphysematous lung.    02/23/2025 the patient is comfortable chest tube draining serosanguineous fluid no air leak pain well controlled.    02/24/2025 the patient has intermittent air leak chest tube draining serosanguineous drainage on water seal the patient is up ambulating not in distress.      Review of patient's allergies indicates:   Allergen Reactions    Ancef [cefazolin] Other (See Comments)     Headaches and dizziness       Past Medical History:   Diagnosis Date    ADD (attention deficit disorder)     Anxiety     Back pain     Depression     Gout     Neuromuscular disorder        Family History   Problem Relation Name Age of Onset    Arthritis Father      Pulmonary fibrosis Mother         Social History[1]    Past Surgical History:   Procedure Laterality Date    BACK  "SURGERY  10/13/2016    NECK SURGERY         Review of Systems   Constitutional:  Negative for activity change and appetite change.   HENT:  Negative for dental problem, nosebleeds and sore throat.    Eyes:  Negative for discharge and visual disturbance.   Respiratory:  Negative for cough, chest tightness and stridor.    Cardiovascular:  Negative for leg swelling.   Gastrointestinal:  Negative for abdominal distention and abdominal pain.   Genitourinary:  Negative for difficulty urinating and dysuria.   Musculoskeletal:  Negative for arthralgias, back pain and joint swelling.   Allergic/Immunologic: Negative for environmental allergies.   Neurological:  Negative for dizziness, syncope and headaches.   Hematological:  Does not bruise/bleed easily.   Psychiatric/Behavioral:  Negative for behavioral problems.           Objective:   /62 (BP Location: Left arm, Patient Position: Lying)   Pulse 102   Temp 98.4 °F (36.9 °C) (Oral)   Resp 18   Ht 5' 8" (1.727 m)   Wt 59 kg (130 lb 1.1 oz)   SpO2 95%   BMI 19.78 kg/m²     X-Ray Chest 1 View  Narrative: EXAMINATION:  XR CHEST 1 VIEW    CLINICAL HISTORY:  Other pneumothorax.Pleural effusion;    COMPARISON:  A study performed 2 hours earlier    FINDINGS:  Interval improvement without complete resolution right pneumothorax in this patient who has a right-sided chest tube in place.  Negative for new pulmonary opacities.  The hilar and mediastinal contours and osseous structures are unchanged.  Impression: 1.  Overall, there is been interval improvement.    Electronically signed by: Vikas Jones MD  Date:    02/24/2025  Time:    09:26  X-Ray Chest 1 View  Narrative: EXAMINATION:  XR CHEST 1 VIEW    CLINICAL HISTORY:  Right-sided pneumothorax; Other pneumothorax    TECHNIQUE:  Single frontal view of the chest was performed.    COMPARISON:  02/21/2025    FINDINGS:  Right chest tube remains in position.  Stable right apical pneumothorax.  Diffuse pulmonary fibrosis and " or superimposed infiltrate suspected.  In comparison to the prior study, there is no adverse interval changes.  Impression: In comparison to the prior study, there is no adverse interval changes    Electronically signed by: Marino Elder MD  Date:    02/24/2025  Time:    07:24         Physical Exam  Vitals reviewed.   Constitutional:       Appearance: Normal appearance.   HENT:      Head: Normocephalic and atraumatic.      Mouth/Throat:      Mouth: Mucous membranes are moist.   Eyes:      Extraocular Movements: Extraocular movements intact.   Cardiovascular:      Rate and Rhythm: Normal rate and regular rhythm.      Pulses: Normal pulses.      Heart sounds: Normal heart sounds.   Pulmonary:      Effort: Pulmonary effort is normal.      Breath sounds: Rhonchi and rales present.   Abdominal:      Palpations: Abdomen is soft.   Musculoskeletal:         General: Normal range of motion.      Cervical back: Normal range of motion and neck supple.   Skin:     General: Skin is warm.      Capillary Refill: Capillary refill takes less than 2 seconds.   Neurological:      General: No focal deficit present.      Mental Status: He is alert and oriented to person, place, and time.   Psychiatric:         Mood and Affect: Mood normal.         Assessment:     1. Spontaneous pneumothorax    2. SOB (shortness of breath)    3. Bilateral pneumothoraces    4. Cough, unspecified type    5. Chest tube in place    6. Tobacco smoker, 1 pack of cigarettes or less per day    7. Abnormal CT of the chest          Plan   Postop day 3 status post right VATS mechanical and chemical pleurodesis.  Neuro Pain control as needed with the morphine and oxycodone  Cardiovascular aspirin beta-blocker  Respiratory aggressive pulmonary toilet  Bronchodilators DuoNeb nebulizer as needed  Chest tube to water seal  Aggressive pulmonary toilet incentive spirometry  Regular diet  DVT prophylaxis with the Lovenox and SCDs  Rest of the management by medical  hospitalist team.  We will plan to send the patient with a Heimlich valve home and follow up as an outpatient on a weekly basis to plan chest tube removal since the patient has intermittent air leak and extensive COPD with a bullous disease generalized this will be a safer approach.          Tim Moss MD  [facility]  [place]       [1]   Social History  Socioeconomic History    Marital status: Single   Tobacco Use    Smoking status: Some Days     Current packs/day: 1.00     Types: Cigarettes    Smokeless tobacco: Never   Substance and Sexual Activity    Alcohol use: Yes     Comment: occassionally     Drug use: No     Social Drivers of Health     Financial Resource Strain: Medium Risk (2/18/2025)    Overall Financial Resource Strain (CARDIA)     Difficulty of Paying Living Expenses: Somewhat hard   Food Insecurity: No Food Insecurity (2/18/2025)    Hunger Vital Sign     Worried About Running Out of Food in the Last Year: Never true     Ran Out of Food in the Last Year: Never true   Transportation Needs: No Transportation Needs (8/19/2024)    Received from [facility]    PRAPARE - Transportation     Lack of Transportation (Medical): No     Lack of Transportation (Non-Medical): No   Physical Activity: Inactive (8/19/2024)    Received from [facility]    Exercise Vital Sign     Days of Exercise per Week: 0 days     Minutes of Exercise per Session: 0 min   Stress: Stress Concern Present (2/18/2025)    [employer] [place] [employer] - Occupational Stress Questionnaire     Feeling of Stress : To some extent   Housing Stability: Low Risk  (2/18/2025)    Housing Stability Vital Sign     Unable to Pay for Housing in the Last Year: No     Homeless in the Last Year: No

## 2025-02-24 NOTE — PLAN OF CARE
Pt oriented x4. Vital signs stable  Pt remained afebrile throughout this shift.   All meds administered per order.   Pt remained free of falls this shift.   Plan of care reviewed. Patient verbalizes understanding.   Pt moving/turning independently.   Bed in lowest position, upper side side rails up x 2, wheels locked  Call light in reach, bed alarm on. Family at bedside.  Patient instructed to call staff for mobility/assistance.  Nonskid socks on when out of bed.  Patient education provided.  No further concerns voiced at this time.    Problem: Adult Inpatient Plan of Care  Goal: Plan of Care Review  Outcome: Progressing  Goal: Patient-Specific Goal (Individualized)  Outcome: Progressing  Goal: Absence of Hospital-Acquired Illness or Injury  Outcome: Progressing  Goal: Optimal Comfort and Wellbeing  Outcome: Progressing  Goal: Readiness for Transition of Care  Outcome: Progressing     Problem: Wound  Goal: Optimal Coping  Outcome: Progressing  Goal: Optimal Functional Ability  Outcome: Progressing  Goal: Absence of Infection Signs and Symptoms  Outcome: Progressing  Goal: Improved Oral Intake  Outcome: Progressing  Goal: Optimal Pain Control and Function  Outcome: Progressing  Goal: Skin Health and Integrity  Outcome: Progressing  Goal: Optimal Wound Healing  Outcome: Progressing     Problem: Fall Injury Risk  Goal: Absence of Fall and Fall-Related Injury  Outcome: Progressing     Problem: Respiratory Compromise (Pneumothorax)  Goal: Optimal Oxygenation and Ventilation  Outcome: Progressing

## 2025-02-25 ENCOUNTER — NURSE TRIAGE (OUTPATIENT)
Dept: ADMINISTRATIVE | Facility: CLINIC | Age: 60
End: 2025-02-25
Payer: MEDICARE

## 2025-02-25 VITALS
DIASTOLIC BLOOD PRESSURE: 66 MMHG | OXYGEN SATURATION: 96 % | TEMPERATURE: 99 F | RESPIRATION RATE: 18 BRPM | WEIGHT: 130.06 LBS | BODY MASS INDEX: 19.71 KG/M2 | SYSTOLIC BLOOD PRESSURE: 107 MMHG | HEIGHT: 68 IN | HEART RATE: 104 BPM

## 2025-02-25 LAB
ANION GAP SERPL CALC-SCNC: 6 MMOL/L (ref 8–16)
BASOPHILS # BLD AUTO: 0.08 K/UL (ref 0–0.2)
BASOPHILS NFR BLD: 1.2 % (ref 0–1.9)
BUN SERPL-MCNC: 18 MG/DL (ref 6–20)
CALCIUM SERPL-MCNC: 9.1 MG/DL (ref 8.7–10.5)
CHLORIDE SERPL-SCNC: 101 MMOL/L (ref 95–110)
CO2 SERPL-SCNC: 28 MMOL/L (ref 23–29)
CREAT SERPL-MCNC: 0.7 MG/DL (ref 0.5–1.4)
DIFFERENTIAL METHOD BLD: ABNORMAL
EOSINOPHIL # BLD AUTO: 0.9 K/UL (ref 0–0.5)
EOSINOPHIL NFR BLD: 13.7 % (ref 0–8)
ERYTHROCYTE [DISTWIDTH] IN BLOOD BY AUTOMATED COUNT: 12.9 % (ref 11.5–14.5)
EST. GFR  (NO RACE VARIABLE): >60 ML/MIN/1.73 M^2
GLUCOSE SERPL-MCNC: 95 MG/DL (ref 70–110)
HCT VFR BLD AUTO: 34.3 % (ref 40–54)
HGB BLD-MCNC: 11.3 G/DL (ref 14–18)
IMM GRANULOCYTES # BLD AUTO: 0.05 K/UL (ref 0–0.04)
IMM GRANULOCYTES NFR BLD AUTO: 0.8 % (ref 0–0.5)
LYMPHOCYTES # BLD AUTO: 1.3 K/UL (ref 1–4.8)
LYMPHOCYTES NFR BLD: 19.8 % (ref 18–48)
MAGNESIUM SERPL-MCNC: 1.6 MG/DL (ref 1.6–2.6)
MCH RBC QN AUTO: 30.5 PG (ref 27–31)
MCHC RBC AUTO-ENTMCNC: 32.9 G/DL (ref 32–36)
MCV RBC AUTO: 93 FL (ref 82–98)
MONOCYTES # BLD AUTO: 1 K/UL (ref 0.3–1)
MONOCYTES NFR BLD: 15.7 % (ref 4–15)
NEUTROPHILS # BLD AUTO: 3.1 K/UL (ref 1.8–7.7)
NEUTROPHILS NFR BLD: 48.8 % (ref 38–73)
NRBC BLD-RTO: 0 /100 WBC
PLATELET # BLD AUTO: 420 K/UL (ref 150–450)
PMV BLD AUTO: 8.6 FL (ref 9.2–12.9)
POTASSIUM SERPL-SCNC: 4.1 MMOL/L (ref 3.5–5.1)
RBC # BLD AUTO: 3.71 M/UL (ref 4.6–6.2)
SODIUM SERPL-SCNC: 135 MMOL/L (ref 136–145)
WBC # BLD AUTO: 6.42 K/UL (ref 3.9–12.7)

## 2025-02-25 PROCEDURE — 25000242 PHARM REV CODE 250 ALT 637 W/ HCPCS: Performed by: STUDENT IN AN ORGANIZED HEALTH CARE EDUCATION/TRAINING PROGRAM

## 2025-02-25 PROCEDURE — 25000003 PHARM REV CODE 250: Performed by: STUDENT IN AN ORGANIZED HEALTH CARE EDUCATION/TRAINING PROGRAM

## 2025-02-25 PROCEDURE — 25000003 PHARM REV CODE 250: Performed by: THORACIC SURGERY (CARDIOTHORACIC VASCULAR SURGERY)

## 2025-02-25 PROCEDURE — 94640 AIRWAY INHALATION TREATMENT: CPT

## 2025-02-25 PROCEDURE — 36415 COLL VENOUS BLD VENIPUNCTURE: CPT | Performed by: NURSE PRACTITIONER

## 2025-02-25 PROCEDURE — 85025 COMPLETE CBC W/AUTO DIFF WBC: CPT | Performed by: NURSE PRACTITIONER

## 2025-02-25 PROCEDURE — 83735 ASSAY OF MAGNESIUM: CPT | Performed by: NURSE PRACTITIONER

## 2025-02-25 PROCEDURE — 80048 BASIC METABOLIC PNL TOTAL CA: CPT | Performed by: NURSE PRACTITIONER

## 2025-02-25 PROCEDURE — 99900035 HC TECH TIME PER 15 MIN (STAT)

## 2025-02-25 PROCEDURE — 97530 THERAPEUTIC ACTIVITIES: CPT

## 2025-02-25 RX ORDER — OXYCODONE AND ACETAMINOPHEN 10; 325 MG/1; MG/1
1 TABLET ORAL EVERY 6 HOURS PRN
Qty: 20 TABLET | Refills: 0 | Status: SHIPPED | OUTPATIENT
Start: 2025-02-25 | End: 2025-02-27

## 2025-02-25 RX ADMIN — IPRATROPIUM BROMIDE 0.5 MG: 0.5 SOLUTION RESPIRATORY (INHALATION) at 02:02

## 2025-02-25 RX ADMIN — OXYCODONE HYDROCHLORIDE 10 MG: 5 TABLET ORAL at 08:02

## 2025-02-25 RX ADMIN — FAMOTIDINE 20 MG: 20 TABLET ORAL at 08:02

## 2025-02-25 RX ADMIN — GUAIFENESIN AND DEXTROMETHORPHAN HYDROBROMIDE 1 TABLET: 600; 30 TABLET, EXTENDED RELEASE ORAL at 08:02

## 2025-02-25 RX ADMIN — MUPIROCIN 1 G: 20 OINTMENT TOPICAL at 08:02

## 2025-02-25 RX ADMIN — POLYETHYLENE GLYCOL 3350 17 G: 17 POWDER, FOR SOLUTION ORAL at 08:02

## 2025-02-25 RX ADMIN — OXYCODONE HYDROCHLORIDE 10 MG: 5 TABLET ORAL at 01:02

## 2025-02-25 NOTE — PLAN OF CARE
[facility] - Telemetry (Hospital)  Discharge Final Note    Primary Care Provider: Herman Quan MD    Expected Discharge Date: 2/25/2025    Final Discharge Note (most recent)       Final Note - 02/25/25 1333          Final Note    Assessment Type Final Discharge Note     Anticipated Discharge Disposition Home-Health Care [facility]     Hospital Resources/Appts/Education Provided Post-Acute resouces added to AVS        Post-Acute Status    Post-Acute Authorization Home Health     Home Health Status Set-up Complete/Auth obtained     Discharge Delays None known at this time                     Important Message from Medicare  Important Message from Medicare regarding Discharge Appeal Rights: Given to patient/caregiver, Explained to patient/caregiver, Signed/date by patient/caregiver     Date IMM was signed: 02/25/25  Time IMM was signed: 1057     Follow-up providers       Tim Moss MD   Specialty: Cardiothoracic Surgery    [address] [place] [address] [address] [place] [address]   Phone: [phone]       Next Steps: Follow up in 1 week(s)              After-discharge care                Home Medical Care       *[facility]   Service: Home Health Services    [address] [facility] [address]   Phone: [phone]                             DC disposition:home health, SOC will be Friday.MD aware.   Non [facility] PCP. Patient to schedule hospital follow up.     No other CM needs noted.

## 2025-02-25 NOTE — DISCHARGE SUMMARY
[facility] - Telemetry ([facility] Medicine  Discharge Summary      Patient Name: Octavio Pang  MRN: [id number]  BLANCA: [id number]  Patient Class: IP- Inpatient  Admission Date: 2/18/2025  Hospital Length of Stay: 7 days  Discharge Date and Time: 2/25/2025  2:24 PM  Attending Physician: Raisa att. providers found   Discharging Provider: Tai Clarke MD  Primary Care Provider: Herman Quan MD    Primary Care Team: [facility] MEDICINE A    HPI:   59-year-old male with a known past medical history which includes ADHD, anxiety, depression, back pain, and tobacco abuse (reports less than 1 ppd) since age of 18 that presented to his PCP for evaluation of worsening shortness of breath over the past couple of months.  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  Patient underwent chest x-ray and he reports a few hours later he was called and told to go to the ER as he had bilateral pneumothoraces and concern for pneumonia on his chest x-ray.  At the time of arrival in the ER, patient underwent another x-ray which did reveal right greater than left bilateral pneumothoraces and a small bore chest tube was placed on the right.  Follow up x-rays with tiny left apical pneumothorax was still a moderate size right pneumothorax.  Patient was admitted to the ICU for close observation.  Patient remains on room air with adequate O2 sats.  He has no other complaints.  He is deemed stable for transfer out of the ICU to the floor in the morning of 2/19 for which Hospital Medicine was consulted to assume care.  CTS has been consulted for further evaluation and management of right pneumothorax.     At the time of my exam in the ICU, the pt is awake and alert on RA in NAD. He quite thin. He is able to provide history and answer questions. Right sided pigtail chest tube in  place connected to suction, but float not activated -- have discussed with nursing to ensure he is at -20 cm suction. Pt awaiting transfer to the floor.    Procedure(s) (LRB):  VATS, WITH PLEURODESIS (Right)  BLOCK, NERVE, INTERCOSTAL, 2 OR MORE (Right)      Hospital Course:   Patient was admitted for spontaneous pneumothorax on right.  Chest tube was placed.  Cardiothoracic surgery consult on case. VATS with chemical pleurodesis performed.  Patient tolerated procedure well.  Chest tube was changed to Heimlich valve.  Repeat chest x-ray stable.  Patient was cleared for discharge by CTS.  He was discharged home.     Goals of Care Treatment Preferences:  Code Status: Full Code      SDOH Screening:  The patient was screened for utility difficulties, food insecurity, transport difficulties, housing insecurity, and interpersonal safety and there were no concerns identified this admission.     Consults:   Consults (From admission, onward)          Status Ordering Provider     Inpatient consult to Hospitalist  Once        Provider:  Shelli Waterman MD    Completed VARGAS HERNANDEZ     Inpatient consult to Cardiothoracic Surgery  Once        Provider:  Tim Moss MD    Completed TAY ESTEVEZ            * Spontaneous pneumothorax  -spontaneous bilateral pneumothoraces present on admit, right-greater-than-left  -continue right pigtail chest tube to -20 cm of suction   -CTS consulted further evaluation and recommendations  -patient on room air, goal O2 sat greater than 92%  -daily chest x-rays  -? underlying ILD v other given environmental exposures  -would benefit from establishing with pulm as an outpt as he has underlying lung disease and reports has no evaluation of it     2/22: s/p right VATS mechanical and chemical pleurodesis, right-sided chest tube in place to 20 cm of suction    2/23- POD 2 s/p R VATS; chest tube draining serosanguineous fluid no air leak; CXR per CTS ordered; encouraged IS;      2/24/2025  POD 3 vats  Discussed case with CTS   Will plan for Heimlich valve  Valve to be placed today   Will observe overnight   Possible DC tomorrow if stable    Abnormal CT of the chest  - see plan for ptx      Anxiety  - PRN xanax  - supportive care      Tobacco smoker, 1 pack of cigarettes or less per day  - cessation education during admission, cessation provided this AM  - refusing nicotine patch currently, will add if pt would like as some point         Final Active Diagnoses:    Diagnosis Date Noted POA    PRINCIPAL PROBLEM:  Spontaneous pneumothorax [J93.83] 02/18/2025 Yes    Tobacco smoker, 1 pack of cigarettes or less per day [F17.210] 02/18/2025 Yes    Anxiety [F41.9] 02/18/2025 Yes    Abnormal CT of the chest [R93.89] 02/18/2025 Yes      Problems Resolved During this Admission:       Discharged Condition: good    Disposition: Home or Self Care    Follow Up:   Contact information for follow-up providers       Tim Moss MD Follow up in 1 week(s).    Specialty: Cardiothoracic Surgery  Contact information:  [address] [place] [address] Dr [address] [place] [address] [phone]                       Contact information for after-discharge care       Home Medical Care       [facility] .    Service: Home Health Services  Contact information:  [address] [place] [address] [phone]                                 Patient Instructions:      Ambulatory referral/consult to Pulmonology   Standing Status: Future   Referral Priority: Routine Referral Type: Consultation   Referral Reason: Specialty Services Required   Requested Specialty: Pulmonary Disease   Number of Visits Requested: 1     Ambulatory referral/consult to Home Health   Standing Status: Future   Referral Priority: Routine Referral Type: Home Health   Referral Reason: Specialty Services Required   Requested Specialty: Home Health Services   Number of Visits Requested: 1        Significant Diagnostic Studies: N/A    Pending Diagnostic Studies:       None           Medications:  Reconciled Home Medications:      Medication List        START taking these medications      oxyCODONE-acetaminophen  mg per tablet  Commonly known as: PERCOCET  Take 1 tablet by mouth every 6 (six) hours as needed for Pain.            CONTINUE taking these medications      allopurinoL 100 MG tablet  Commonly known as: ZYLOPRIM  Take 100 mg by mouth 3 (three) times daily.     ALPRAZolam 1 MG tablet  Commonly known as: XANAX  Take 2 mg by mouth nightly as needed for Anxiety.     clonazePAM 1 MG tablet  Commonly known as: KlonoPIN  Take 1 mg by mouth 2 (two) times daily as needed for Anxiety.     gabapentin 100 MG capsule  Commonly known as: NEURONTIN  Take 100 mg by mouth as needed.     lisdexamfetamine 30 MG capsule  Commonly known as: VYVANSE  Take 30 mg by mouth every morning.     methocarbamoL 500 MG Tab  Commonly known as: Robaxin  Take 500 mg by mouth.     methylPREDNISolone 4 mg tablet  Commonly known as: MEDROL DOSEPACK  As per package label     naproxen 500 MG tablet  Commonly known as: NAPROSYN  Take 1 tablet (500 mg total) by mouth 2 (two) times daily with meals.            STOP taking these medications      ibuprofen 200 MG tablet  Commonly known as: ADVIL,MOTRIN     indomethacin 25 MG capsule  Commonly known as: INDOCIN              Indwelling Lines/Drains at time of discharge:   Lines/Drains/Airways       Drain  Duration                  Chest Tube 02/21/25 1326 Tube - 1 Right Pleural 28 Fr. 4 days                    Time spent on the discharge of patient: 38 minutes         Tai Clarke MD  Department of Hospital Medicine  '[facility] - Telemetry ([facility])

## 2025-02-25 NOTE — PT/OT/SLP PROGRESS
Occupational Therapy   Treatment    Name: Octavio Pang  MRN: [id number]  Admitting Diagnosis:  Spontaneous pneumothorax  4 Days Post-Op    Recommendations:     Discharge Recommendations: Low Intensity Therapy  Discharge Equipment Recommendations:  none  Barriers to discharge:  None    Assessment:     Octavio Pang is a 59 y.o. male with a medical diagnosis of Spontaneous pneumothorax. Performance deficits affecting function are weakness, gait instability, decreased upper extremity function, impaired cardiopulmonary response to activity, impaired balance, impaired endurance, decreased safety awareness, impaired self care skills, pain, impaired functional mobility.     Rehab Prognosis:  Good; patient would benefit from acute skilled OT services to address these deficits and reach maximum level of function.       Plan:     Patient to be seen 2 x/week to address the above listed problems via self-care/home management, therapeutic exercises, therapeutic activities  Plan of Care Expires: 03/08/25  Plan of Care Reviewed with: patient, family    Subjective     Chief Complaint: Pain with coughing   Patient/Family Comments/goals: Increase independence  Pain/Comfort:  Pain Rating 1: 0/10  Pain Rating Post-Intervention 1: 0/10    Objective:     Communicated with: Nurse prior to session.  Patient found HOB elevated with telemetry, peripheral IV, chest tube upon OT entry to room.    General Precautions: Standard, fall    Orthopedic Precautions:N/A  Braces: N/A  Respiratory Status: Room air     Occupational Performance:     Bed Mobility:    Patient completed Rolling/Turning to Left with  modified independence  Patient completed Supine to Sit with modified independence  Patient completed Sit to Supine with modified independence     Functional Mobility/Transfers:  Patient completed Sit <> Stand Transfer with modified independence  with  no assistive device   Functional Mobility: Ambulated 2x200 feet with SPV due to intermittent  standing rest breaks from coughing/pain with coughing    [facility] 6 Click ADL: 20    Treatment & Education:  Pt ambulates with forward flexed posture, however states this is his normal stance due to previous back/neck surgeries. Pt ambulated 2x200 feet SPV, with intermittent standing rest breaks with hand on wall rail due to pain with cough.  Pt requested to return to bed due to fatigue from coughing spells, transitioning into supine with mod I.  Encouraged patient/family to request mobility tech or nursing staff assistance in order to ambulate additional 2-3 times more throughout the day as tolerated. Pt verbalized understanding.       Patient left HOB elevated with all lines intact, call button in reach, and family member present    GOALS:   Multidisciplinary Problems       Occupational Therapy Goals          Problem: Occupational Therapy    Goal Priority Disciplines Outcome Interventions   Occupational Therapy Goal     OT, PT/OT Progressing    Description: Goals to be met by: 3/8/25     Patient will increase functional independence with ADLs by performing:    LE dressing with independence.  UE Dressing with Millard.  Toileting from toilet with Millard for hygiene and clothing management.   Tolerates sitting edge of bed during functional activity with Millard in preparation for bathing.                           Time Tracking:     OT Date of Treatment: 02/25/25  OT Start Time: 0840  OT Stop Time: 0905  OT Total Time (min): 25 min    Billable Minutes:Therapeutic Activity 25    AMAYA Raman  OT/LYNN: OT     Number of LYNN visits since last OT visit: 0    2/25/2025

## 2025-02-25 NOTE — PROGRESS NOTES
Subjective:      Patient ID: Octavio Pang is a 59 y.o. male.    Chief Complaint: pneumothorax (X ray at pcp today. SOB x 2 months. )    HPI: History of Present Illness:  This 59-year-old male with a  smoker for lifelong has not seen a pulmonologist was seen in the eyebrow we will ER for a spontaneous pneumothorax on the right side was transferred for further management.  The patient had a cook catheter placed in the right chest and was connected to Pleur-evac.  The patient does not have any significant cardiorespiratory history.    Date of Procedure: 2/21/2025      Procedure: Procedure(s) (LRB):  VATS, WITH PLEURODESIS (Right)  BLOCK, NERVE, INTERCOSTAL, 2 OR MORE (Right)     Surgeons and Role:     * Tim Moss MD - Primary     Assisting Surgeon: None     Pre-Operative Diagnosis: Spontaneous pneumothorax [J93.83]     Post-Operative Diagnosis: Post-Op Diagnosis Codes:     * Spontaneous pneumothorax [J93.83]  02/22/2025 the patient is comfortable incentive spirometry more than 1000 cc chest tube no air leak draining serous fluid chest x-ray shows a sliver of pneumothorax on the right side with a severely destroyed emphysematous lung.    02/23/2025 the patient is comfortable chest tube draining serosanguineous fluid no air leak pain well controlled.    02/24/2025 the patient has intermittent air leak chest tube draining serosanguineous drainage on water seal the patient is up ambulating not in distress.    2/25/25 the patient does have intermittent air leak and pneumothorax on the right side from his extensive emphysema and bullous disease.      Review of patient's allergies indicates:   Allergen Reactions    Ancef [cefazolin] Other (See Comments)     Headaches and dizziness       Past Medical History:   Diagnosis Date    ADD (attention deficit disorder)     Anxiety     Back pain     Depression     Gout     Neuromuscular disorder        Family History   Problem Relation Name Age of Onset     "Arthritis Father      Pulmonary fibrosis Mother         Social History[1]    Past Surgical History:   Procedure Laterality Date    BACK SURGERY  10/13/2016    INJECTION OF ANESTHETIC AGENT AROUND MULTIPLE INTERCOSTAL NERVES Right 2/21/2025    Procedure: BLOCK, NERVE, INTERCOSTAL, 2 OR MORE;  Surgeon: Tim Moss MD;  Location: [facility] OR;  Service: Cardiothoracic;  Laterality: Right;  MULTIPLE INTERCOSTAL NERVE BLOCKS    NECK SURGERY      PLEURODESIS WITH VIDEO-ASSISTED THORACOSCOPIC SURGERY (VATS) Right 2/21/2025    Procedure: VATS, WITH PLEURODESIS;  Surgeon: Tim Moss MD;  Location: [facility] OR;  Service: Cardiothoracic;  Laterality: Right;  CHEMICAL AND MECHANICAL PLEURODESIS       Review of Systems   Constitutional:  Negative for activity change and appetite change.   HENT:  Negative for dental problem, nosebleeds and sore throat.    Eyes:  Negative for discharge and visual disturbance.   Respiratory:  Negative for cough, chest tightness and stridor.    Cardiovascular:  Negative for leg swelling.   Gastrointestinal:  Negative for abdominal distention and abdominal pain.   Genitourinary:  Negative for difficulty urinating and dysuria.   Musculoskeletal:  Negative for arthralgias, back pain and joint swelling.   Allergic/Immunologic: Negative for environmental allergies.   Neurological:  Negative for dizziness, syncope and headaches.   Hematological:  Does not bruise/bleed easily.   Psychiatric/Behavioral:  Negative for behavioral problems.           Objective:   /72 (BP Location: Left arm, Patient Position: Lying)   Pulse 95   Temp 98.2 °F (36.8 °C) (Oral)   Resp 18   Ht 5' 8" (1.727 m)   Wt 59 kg (130 lb 1.1 oz)   SpO2 97%   BMI 19.78 kg/m²     X-Ray Chest 1 View  EXAM: XR CHEST 1 VIEW    CLINICAL HISTORY: Pneumothorax    PRIOR:  Earlier imaging same date    FINDINGS:   Small volume pneumothorax right apical appears slightly larger or more conspicuous with chest tube in place.  Pulmonary " opacity bilateral not significant changed    IMPRESSION:  Small apical right pneumothorax with chest tube in place    Finalized on: 2/24/2025 9:06 PM By:  Audrey Gunn MD  [facility]# [id number]      2025-02-24 21:08:[id number]     [facility]  X-Ray Chest 1 View  Narrative: EXAMINATION:  XR CHEST 1 VIEW    CLINICAL HISTORY:  Other pneumothorax.Pleural effusion;    COMPARISON:  A study performed 2 hours earlier    FINDINGS:  Interval improvement without complete resolution right pneumothorax in this patient who has a right-sided chest tube in place.  Negative for new pulmonary opacities.  The hilar and mediastinal contours and osseous structures are unchanged.  Impression: 1.  Overall, there is been interval improvement.    Electronically signed by: Vikas Jones MD  Date:    02/24/2025  Time:    09:26  X-Ray Chest 1 View  Narrative: EXAMINATION:  XR CHEST 1 VIEW    CLINICAL HISTORY:  Right-sided pneumothorax; Other pneumothorax    TECHNIQUE:  Single frontal view of the chest was performed.    COMPARISON:  02/21/2025    FINDINGS:  Right chest tube remains in position.  Stable right apical pneumothorax.  Diffuse pulmonary fibrosis and or superimposed infiltrate suspected.  In comparison to the prior study, there is no adverse interval changes.  Impression: In comparison to the prior study, there is no adverse interval changes    Electronically signed by: Marino Elder MD  Date:    02/24/2025  Time:    07:24         Physical Exam  Vitals reviewed.   Constitutional:       Appearance: Normal appearance.   HENT:      Head: Normocephalic and atraumatic.      Mouth/Throat:      Mouth: Mucous membranes are moist.   Eyes:      Extraocular Movements: Extraocular movements intact.   Cardiovascular:      Rate and Rhythm: Normal rate and regular rhythm.      Pulses: Normal pulses.      Heart sounds: Normal heart sounds.   Pulmonary:      Effort: Pulmonary effort is normal.      Breath sounds: Rhonchi and rales present.   Abdominal:       Palpations: Abdomen is soft.   Musculoskeletal:         General: Normal range of motion.      Cervical back: Normal range of motion and neck supple.   Skin:     General: Skin is warm.      Capillary Refill: Capillary refill takes less than 2 seconds.   Neurological:      General: No focal deficit present.      Mental Status: He is alert and oriented to person, place, and time.   Psychiatric:         Mood and Affect: Mood normal.         Assessment:     1. Spontaneous pneumothorax    2. SOB (shortness of breath)    3. Bilateral pneumothoraces    4. Cough, unspecified type    5. Chest tube in place    6. Tobacco smoker, 1 pack of cigarettes or less per day    7. Abnormal CT of the chest          Plan   Postop day 4 status post right VATS mechanical and chemical pleurodesis.  Neuro Pain control as needed with the morphine and oxycodone  Cardiovascular aspirin beta-blocker  Respiratory aggressive pulmonary toilet  Bronchodilators DuoNeb nebulizer as needed  Chest tube to water seal  Aggressive pulmonary toilet incentive spirometry  Regular diet  DVT prophylaxis with the Lovenox and SCDs  Rest of the management by medical hospitalist team.  We will plan to send the patient with a Heimlich valve home and follow up as an outpatient on a weekly basis to plan chest tube removal since the patient has intermittent air leak and extensive COPD with a bullous disease generalized this will be a safer approach.  The patient can be discharged with home health and follow-up with me in 1 week with a chest x-ray as an outpatient.          Tim Moss MD  [facility]  [place]       [1]   Social History  Socioeconomic History    Marital status: Single   Tobacco Use    Smoking status: Some Days     Current packs/day: 1.00     Types: Cigarettes    Smokeless tobacco: Never   Substance and Sexual Activity    Alcohol use: Yes     Comment: occassionally     Drug use: No     Social Drivers of Health     Financial  Resource Strain: Medium Risk (2/18/2025)    Overall Financial Resource Strain (CARDIA)     Difficulty of Paying Living Expenses: Somewhat hard   Food Insecurity: No Food Insecurity (2/18/2025)    Hunger Vital Sign     Worried About Running Out of Food in the Last Year: Never true     Ran Out of Food in the Last Year: Never true   Transportation Needs: No Transportation Needs (8/19/2024)    Received from [employer] [facility] and Its Subsidiaries and Affiliates    PRAPARE - Transportation     Lack of Transportation (Medical): No     Lack of Transportation (Non-Medical): No   Physical Activity: Inactive (8/19/2024)    [facility] [employer] [facility]    Exercise Vital Sign     Days of Exercise per Week: 0 days     Minutes of Exercise per Session: 0 min   Stress: Stress Concern Present (2/18/2025)    [employer] [place] [employer] - Occupational Stress Questionnaire     Feeling of Stress : To some extent   Housing Stability: Low Risk  (2/18/2025)    Housing Stability Vital Sign     Unable to Pay for Housing in the Last Year: No     Homeless in the Last Year: No

## 2025-02-25 NOTE — PLAN OF CARE
Pt bed mobility mod I  Seated scoot mod I  Sit to stand mod I  Gait 2x200 feet SPV due to intermittent standing rests from coughing spells/pain with coughing    Recommend low intensity therapy at DC

## 2025-02-25 NOTE — PLAN OF CARE
Problem: Adult Inpatient Plan of Care  Goal: Plan of Care Review  Outcome: Progressing  Goal: Patient-Specific Goal (Individualized)  Outcome: Progressing  Goal: Absence of Hospital-Acquired Illness or Injury  Outcome: Progressing  Goal: Optimal Comfort and Wellbeing  Outcome: Progressing  Goal: Readiness for Transition of Care  Outcome: Progressing     Problem: Wound  Goal: Optimal Coping  Outcome: Progressing  Goal: Optimal Functional Ability  Outcome: Progressing  Goal: Absence of Infection Signs and Symptoms  Outcome: Progressing  Goal: Improved Oral Intake  Outcome: Progressing  Goal: Optimal Pain Control and Function  Outcome: Progressing  Goal: Skin Health and Integrity  Outcome: Progressing  Goal: Optimal Wound Healing  Outcome: Progressing     Problem: Fall Injury Risk  Goal: Absence of Fall and Fall-Related Injury  Outcome: Progressing     Problem: Respiratory Compromise (Pneumothorax)  Goal: Optimal Oxygenation and Ventilation  Outcome: Progressing

## 2025-02-26 ENCOUNTER — TELEPHONE (OUTPATIENT)
Dept: CARDIOLOGY | Facility: CLINIC | Age: 60
End: 2025-02-26
Payer: MEDICARE

## 2025-02-26 DIAGNOSIS — J93.83 SPONTANEOUS PNEUMOTHORAX: ICD-10-CM

## 2025-02-26 RX ORDER — OXYCODONE AND ACETAMINOPHEN 10; 325 MG/1; MG/1
1 TABLET ORAL EVERY 6 HOURS PRN
Qty: 20 TABLET | Refills: 0 | Status: CANCELLED | OUTPATIENT
Start: 2025-02-26

## 2025-02-26 NOTE — TELEPHONE ENCOUNTER
Pt had VATS procedure . States pain medication out of stock at pharmacy and needs it transferred. Pt not established with any [facility].   Warm transferred to , Antonia, for connection to unit where pt was admitted to contact on call for medication transfer.     Reason for Disposition   [1] Prescription refill request for ESSENTIAL medicine (i.e., likelihood of harm to patient if not taken) AND [2] triager unable to refill per department policy    Protocols used: Medication Refill and Renewal Call-A-AH

## 2025-02-26 NOTE — TELEPHONE ENCOUNTER
Pt called back. He says he is still waiting to hear back regarding his pain medications. He says he has been waiting for 2 days and is upset.  Please advise on next steps.

## 2025-02-26 NOTE — TELEPHONE ENCOUNTER
Called Sandra Robel [phone] - house supervisor- expalined situation to get Percocet filled- original script was never filled due to pharmacy not carrying it  Sent Dr crowder a message to send original order to [facility] to fill and he said the on call hospital medicine doctor needed to send it to pharmacy. - Sandra will speak with night hospitalists to see who can send script in AM for the patient.    Informed pt to take tylenol and ibuprofen as directed on bottle for tonight. Pt asked about taking BC powder and told pt not to take BC powder but to stick with tylenol and ibuprofen. ALSO- but if pain gets too bad to go to [place] and let them know about the pain - and that they can reach out to Sandra to explain further. Explained that pt would be charged an ER visit if he went. Also would call him back in AM and let him know where he can  his original script. Also informed pt to stop OTC tylenol and ibuprofen once pain medicine is obtained. Pt verbalized understanding         Please send pain med to pharmacy that has it in stocl- pt ststed his pharmacy does not have it in stock- Sx 2/18        ----- Message from Siva sent at 2/26/2025 11:06 AM CST -----  Contact: [phone]@[email or web address]  Type: Returning a callWho left a message? Patient When did the practice call?Does patient know what this is regarding:Would the patient rather a call back or a response via [facility]? Call back Comments: Patient would like a call back to discuss if the doc can find a pharmacy to send his med oxyCODONE-acetaminophen (PERCOCET)  mg per tablet to due the pharmacy not having it in stock . Please call patient to advise

## 2025-02-27 ENCOUNTER — TELEPHONE (OUTPATIENT)
Dept: CARDIOLOGY | Facility: CLINIC | Age: 60
End: 2025-02-27
Payer: MEDICARE

## 2025-02-27 ENCOUNTER — TELEPHONE (OUTPATIENT)
Dept: CARDIOTHORACIC SURGERY | Facility: CLINIC | Age: 60
End: 2025-02-27
Payer: MEDICARE

## 2025-02-27 DIAGNOSIS — J93.83 SPONTANEOUS PNEUMOTHORAX: ICD-10-CM

## 2025-02-27 RX ORDER — OXYCODONE AND ACETAMINOPHEN 10; 325 MG/1; MG/1
1 TABLET ORAL EVERY 6 HOURS PRN
Qty: 20 TABLET | Refills: 0 | Status: SHIPPED | OUTPATIENT
Start: 2025-02-27

## 2025-02-27 RX ORDER — OXYCODONE AND ACETAMINOPHEN 10; 325 MG/1; MG/1
1 TABLET ORAL EVERY 6 HOURS PRN
COMMUNITY
End: 2025-02-27 | Stop reason: CLARIF

## 2025-02-27 NOTE — TELEPHONE ENCOUNTER
----- Message from Janna sent at 2/26/2025  1:48 PM CST -----  Type:  Needs Medical AdviceWho Called: ptSymptoms (please be specific): severe pain How long has patient had these symptoms:  yesterday after dischargePharmacy name and phone #:  naWould the patient rather a call back or a response via [facility]? callBest Call Back Number: [phone] Information: requesting to speak with office regarding medication that is not in stock and is in severe pain. Patient states he was advised he would have medication same day of discharge but pharmacy does not have medication in stock. Please call back asap regarding alternative medication or alternative pharmacy. Patient needs to be able to take something asap as he has been suffering with pain. Patient also has infection by incision where tape was. Draining puss where the tube is.  ----- Message -----  From: Janna Saavedra  Sent: 2/26/2025   1:53 PM CST  To: Ajay Dumont Staff    Type:  Needs Medical AdviceWho Called: ptSymptoms (please be specific): severe pain How long has patient had these symptoms:  yesterday after dischargePharmacy name and phone #:  naWould the patient rather a call back or a response via [facility]? callBest Call Back Number: [phone] Information: requesting to speak with office regarding medication that is not in stock and is in severe pain. Patient states he was advised he would have medication same day of discharge but pharmacy does not have medication in stock. Please call back asap regarding alternative medication or alternative pharmacy. Patient needs to be able to take something asap as he has been suffering with pain.

## 2025-02-27 NOTE — TELEPHONE ENCOUNTER
Contacted PT and informed him that we have staff currently working on this PT stated verbal understanding          ----- Message from Chen sent at 2/27/2025  9:51 AM CST -----  Contact: [phone]  Patient is returning a phone call.Who left a message for the patient: Roberta Hamilton LPNDoes patient know what this is regarding:  yes Would you like a call back, or a response through your [facility] portal?:   call back Comments: Pt is calling back to see what is going on with his medication

## 2025-02-27 NOTE — TELEPHONE ENCOUNTER
Spoke with [facility] this AM they do not carry percocet- called [facility] and [facility] in [place]- they do not carry it either.   Will cancel script to [facility] and send to [facility] to fill percocet    I cancelled the original script that went to [facility] and now I need an MD to re-write the script for Percocet and send it to [facility]  Please   -- I did reorder but since it is a controlled substance it will not e-scribe since I am not an MD - I will discontinue so MD can write under their name and e-scribe to [facility]    Thanks     Notified house supervisor Ela Robertson to speak to hospitalists to get re-ordered and sent to [facility]

## 2025-02-28 ENCOUNTER — NURSE TRIAGE (OUTPATIENT)
Dept: ADMINISTRATIVE | Facility: CLINIC | Age: 60
End: 2025-02-28
Payer: MEDICARE

## 2025-02-28 ENCOUNTER — TELEPHONE (OUTPATIENT)
Dept: CARDIOTHORACIC SURGERY | Facility: CLINIC | Age: 60
End: 2025-02-28
Payer: MEDICARE

## 2025-02-28 ENCOUNTER — HOSPITAL ENCOUNTER (EMERGENCY)
Facility: HOSPITAL | Age: 60
Discharge: HOME | End: 2025-02-28
Attending: EMERGENCY MEDICINE
Payer: MEDICARE

## 2025-02-28 VITALS
HEART RATE: 102 BPM | HEIGHT: 68 IN | BODY MASS INDEX: 19.01 KG/M2 | WEIGHT: 125.44 LBS | OXYGEN SATURATION: 98 % | SYSTOLIC BLOOD PRESSURE: 104 MMHG | DIASTOLIC BLOOD PRESSURE: 67 MMHG | TEMPERATURE: 98 F | RESPIRATION RATE: 19 BRPM

## 2025-02-28 DIAGNOSIS — Z96.89 CHEST TUBE IN PLACE: ICD-10-CM

## 2025-02-28 DIAGNOSIS — J93.9 PNEUMOTHORAX ON RIGHT: Primary | ICD-10-CM

## 2025-02-28 DIAGNOSIS — T14.8XXA WOUND DRAINAGE: ICD-10-CM

## 2025-02-28 DIAGNOSIS — J93.83 SPONTANEOUS PNEUMOTHORAX: Primary | ICD-10-CM

## 2025-02-28 LAB
ALBUMIN SERPL BCP-MCNC: 2.9 G/DL (ref 3.5–5.2)
ALP SERPL-CCNC: 69 U/L (ref 40–150)
ALT SERPL W/O P-5'-P-CCNC: 20 U/L (ref 10–44)
ANION GAP SERPL CALC-SCNC: 10 MMOL/L (ref 8–16)
AST SERPL-CCNC: 21 U/L (ref 10–40)
BASOPHILS # BLD AUTO: 0.07 K/UL (ref 0–0.2)
BASOPHILS NFR BLD: 0.7 % (ref 0–1.9)
BILIRUB SERPL-MCNC: 0.4 MG/DL (ref 0.1–1)
BUN SERPL-MCNC: 17 MG/DL (ref 6–20)
CALCIUM SERPL-MCNC: 9.4 MG/DL (ref 8.7–10.5)
CHLORIDE SERPL-SCNC: 100 MMOL/L (ref 95–110)
CO2 SERPL-SCNC: 26 MMOL/L (ref 23–29)
CREAT SERPL-MCNC: 0.7 MG/DL (ref 0.5–1.4)
DIFFERENTIAL METHOD BLD: ABNORMAL
EOSINOPHIL # BLD AUTO: 0.7 K/UL (ref 0–0.5)
EOSINOPHIL NFR BLD: 7.4 % (ref 0–8)
ERYTHROCYTE [DISTWIDTH] IN BLOOD BY AUTOMATED COUNT: 13.1 % (ref 11.5–14.5)
EST. GFR  (NO RACE VARIABLE): >60 ML/MIN/1.73 M^2
GLUCOSE SERPL-MCNC: 103 MG/DL (ref 70–110)
HCT VFR BLD AUTO: 36.4 % (ref 40–54)
HGB BLD-MCNC: 12.2 G/DL (ref 14–18)
IMM GRANULOCYTES # BLD AUTO: 0.06 K/UL (ref 0–0.04)
IMM GRANULOCYTES NFR BLD AUTO: 0.6 % (ref 0–0.5)
LYMPHOCYTES # BLD AUTO: 1.4 K/UL (ref 1–4.8)
LYMPHOCYTES NFR BLD: 14.5 % (ref 18–48)
MCH RBC QN AUTO: 30.3 PG (ref 27–31)
MCHC RBC AUTO-ENTMCNC: 33.5 G/DL (ref 32–36)
MCV RBC AUTO: 90 FL (ref 82–98)
MONOCYTES # BLD AUTO: 1.2 K/UL (ref 0.3–1)
MONOCYTES NFR BLD: 13 % (ref 4–15)
NEUTROPHILS # BLD AUTO: 6 K/UL (ref 1.8–7.7)
NEUTROPHILS NFR BLD: 63.8 % (ref 38–73)
NRBC BLD-RTO: 0 /100 WBC
PLATELET # BLD AUTO: 479 K/UL (ref 150–450)
PMV BLD AUTO: 8.9 FL (ref 9.2–12.9)
POTASSIUM SERPL-SCNC: 4.1 MMOL/L (ref 3.5–5.1)
PROT SERPL-MCNC: 8.4 G/DL (ref 6–8.4)
RBC # BLD AUTO: 4.03 M/UL (ref 4.6–6.2)
SODIUM SERPL-SCNC: 136 MMOL/L (ref 136–145)
WBC # BLD AUTO: 9.38 K/UL (ref 3.9–12.7)

## 2025-02-28 PROCEDURE — 85025 COMPLETE CBC W/AUTO DIFF WBC: CPT | Mod: ER | Performed by: EMERGENCY MEDICINE

## 2025-02-28 PROCEDURE — 86803 HEPATITIS C AB TEST: CPT | Performed by: EMERGENCY MEDICINE

## 2025-02-28 PROCEDURE — 80053 COMPREHEN METABOLIC PANEL: CPT | Mod: ER | Performed by: EMERGENCY MEDICINE

## 2025-02-28 PROCEDURE — 99284 EMERGENCY DEPT VISIT MOD MDM: CPT | Mod: 25,ER

## 2025-02-28 PROCEDURE — 87389 HIV-1 AG W/HIV-1&-2 AB AG IA: CPT | Performed by: EMERGENCY MEDICINE

## 2025-02-28 NOTE — TELEPHONE ENCOUNTER
Attempted to reach patient regarding chest tube drainage.    Contacted Sussy s/o who reported she tried to bring him to the ER. Pt refused.    Advised her to bring him to the ER for evaluation and treatment of wound.    Home health also notified of drainage, Spoke with Racquel regarding continuous suction chest tube, she reported that they are not able to take care of patients with continuous suction.

## 2025-02-28 NOTE — TELEPHONE ENCOUNTER
Pt calling to report leakage around drain site. Denies fever. Advised, per protocol and verbalizes understanding. Informed I would route a message to provider for follow up in this regard.    Reason for Disposition   Dressing soaked with blood or body fluid (e.g., drainage)    Additional Information   Negative: Major abdominal surgical incision and wound gaping open with visible internal organs   Negative: Sounds like a life-threatening emergency to the triager   Negative: Bleeding from incision and won't stop after 10 minutes of direct pressure   Negative: Bleeding (more than a few drops) from incision and after tracheostomy or blood vessel surgery (e.g., carotid endarterectomy, femoral bypass graft, kidney dialysis fistula)   Negative: Bright red, wide-spread, sunburn-like rash   Negative: SEVERE pain in the incision   Negative: Incision gaping open and < 2 days (48 hours) since wound re-opened   Negative: Incision gaping open and length of opening > 2 inches (5 cm)   Negative: Patient sounds very sick or weak to the triager   Negative: Sounds like a serious complication to the triager   Negative: Pus or bad-smelling fluid draining from incision   Negative: Red streak runs from the incision and longer than 1 inch (2.5 cm)   Negative: Incision looks infected (e.g., spreading redness, pus)   Negative: Fever > 100.4 F (38.0 C)    Protocols used: Post-Op Incision Symptoms and Vdamomwuw-X-OS

## 2025-02-28 NOTE — ED PROVIDER NOTES
Emergency Medicine Provider Note - 2/28/2025       History     Chief Complaint   Patient presents with    Wound Check     Concerned about drainage from chest tube site       Allergies:  Review of patient's allergies indicates:   Allergen Reactions    Ancef [cefazolin] Other (See Comments)     Headaches and dizziness        History of Present Illness   HPI    2/28/2025, 5:14 PM  The history is provided by the patient and old chart.     Octavio Pang is a 59 y.o. male presenting to the ED for drainage at the chest tube site.  Patient was admitted to [facility] on February 18, 2025 for right greater than left spontaneous pneumothoraces with pneumonia.  Patient has past medical history of ADHD, anxiety, depression, tobacco abuse, and back pain.  Cardiothoracic surgery had performed a VATS procedure with pleurodesis on February 21st 2025 by Dr. Tim Moss.  Patient was discharged to home on the 25th February.  Patient reports that he is feeling wet where the ostomy site for the pleural catheter is present.  He has been wearing the same shirt since discharge on the 25th.  Patient denies any fever, chills.  Patient does have a cough.  Currently not on antibiotics.  Patient had home health care come today and was referred to the emergency department.    Photo sharing drainage from the ostomy site of the pleural catheter.  Same shirt since 2/25/2025      Arrival mode: Private Vehicle     PCP: Herman Quan MD     Past Medical History:  Past Medical History:   Diagnosis Date    ADD (attention deficit disorder)     Anxiety     Back pain     Depression     Gout     Neuromuscular disorder        Past Surgical History:  Past Surgical History:   Procedure Laterality Date    BACK SURGERY  10/13/2016    INJECTION OF ANESTHETIC AGENT AROUND MULTIPLE INTERCOSTAL NERVES Right 2/21/2025    Procedure: BLOCK, NERVE, INTERCOSTAL, 2 OR MORE;  Surgeon: Tim Moss MD;  Location: [facility] OR;  Service: Cardiothoracic;   Laterality: Right;  MULTIPLE INTERCOSTAL NERVE BLOCKS    NECK SURGERY      PLEURODESIS WITH VIDEO-ASSISTED THORACOSCOPIC SURGERY (VATS) Right 2/21/2025    Procedure: VATS, WITH PLEURODESIS;  Surgeon: Tim Moss MD;  Location: [facility];  Service: Cardiothoracic;  Laterality: Right;  CHEMICAL AND MECHANICAL PLEURODESIS         Family History:  Family History   Problem Relation Name Age of Onset    Arthritis Father      Pulmonary fibrosis Mother         Social History:  Social History     Tobacco Use    Smoking status: Some Days     Current packs/day: 1.00     Types: Cigarettes    Smokeless tobacco: Never   Substance and Sexual Activity    Alcohol use: Yes     Comment: occassionally     Drug use: No    Sexual activity: Yes     Partners: Female       The Past Medical History, Social History, Surgical History and Family History was reviewed as documented above.     Review of Systems   Review of Systems   Constitutional:  Negative for fever.   Eyes:  Negative for redness.   Respiratory:  Positive for cough. Negative for shortness of breath.    Cardiovascular:  Negative for chest pain.   Gastrointestinal:  Negative for nausea and vomiting.   Genitourinary:  Negative for dysuria.   Neurological:  Negative for weakness.        Physical Exam     Initial Vitals [02/28/25 1710]   BP Pulse Resp Temp SpO2   107/75 110 20 98.1 °F (36.7 °C) 95 %      MAP       --          Physical Exam    Nursing Notes and Vital Signs Reviewed.  Constitutional: Patient is in no apparent distress. Thin appearing.   Head: Atraumatic. Normocephalic.  Eyes: PERRL. EOM intact. Conjunctivae are not pale. No scleral icterus.  Cardiovascular: Regular rate. Regular rhythm. No murmurs, rubs, or gallops. Distal pulses are 2+ and symmetric.  Pulmonary/Chest: No respiratory distress.  Rhonchi bilaterally.  Abdominal: Soft and non-distended.  There is no tenderness.  No rebound, guarding, or rigidity. Good bowel sounds.  Musculoskeletal: Moves all  "extremities. No obvious deformities. No edema. No calf tenderness.  Genitourinary:   Skin: Warm and dry.  Neurological:  Alert, awake, and appropriate.  Normal speech.  No acute focal neurological deficits are appreciated.  Psychiatric: Normal affect. Good eye contact. Appropriate in content.    Photo showing drainage to the bandages.      The chest tube appears to be sutured in place.  Minimal redness.      This is the amount of drainage since discharge on the 25th.  Serosanguineous.  No cloudiness.       ED Course   ED Procedures:  Procedures    ED Vital Signs:  Vitals:    02/28/25 1710 02/28/25 1859 02/28/25 1944   BP: 107/75 102/65 104/67   Pulse: 110 106 102   Resp: 20 20 19   Temp: 98.1 °F (36.7 °C)     TempSrc: Oral     SpO2: 95% 97% 98%   Weight: 56.9 kg (125 lb 7.1 oz)     Height: 5' 8" (1.727 m)         All Lab Results:  Labs Reviewed   CBC W/ AUTO DIFFERENTIAL - Abnormal       Result Value    WBC 9.38      RBC 4.03 (*)     Hemoglobin 12.2 (*)     Hematocrit 36.4 (*)     MCV 90      MCH 30.3      MCHC 33.5      RDW 13.1      Platelets 479 (*)     MPV 8.9 (*)     Immature Granulocytes 0.6 (*)     Gran # (ANC) 6.0      Immature Grans (Abs) 0.06 (*)     Lymph # 1.4      Mono # 1.2 (*)     Eos # 0.7 (*)     Baso # 0.07      nRBC 0      Gran % 63.8      Lymph % 14.5 (*)     Mono % 13.0      Eosinophil % 7.4      Basophil % 0.7      Differential Method Automated     COMPREHENSIVE METABOLIC PANEL - Abnormal    Sodium 136      Potassium 4.1      Chloride 100      CO2 26      Glucose 103      BUN 17      Creatinine 0.7      Calcium 9.4      Total Protein 8.4      Albumin 2.9 (*)     Total Bilirubin 0.4      Alkaline Phosphatase 69      AST 21      ALT 20      eGFR >60.0      Anion Gap 10     HEPATITIS C ANTIBODY    Hepatitis C Ab Non-reactive      Narrative:     Release to patient->Immediate   HIV 1 / 2 ANTIBODY    HIV 1/2 Ag/Ab Non-reactive      Narrative:     Release to patient->Immediate       Labs were " independently reviewed.  CBC and CMP unremarkable aside from hypoalbuminemia with an albumin level 2.9.    The EKG was ordered, reviewed, and independently interpreted by the ED provider:          Imaging Results:  Imaging Results              X-Ray Chest AP Portable (Final result)  Result time 02/28/25 18:32:23      Final result by Ellen NoblesMichaelMD julissa (02/28/25 18:32:23)                   Impression:      Right pneumothorax appears similar may be slightly larger than on previous exam.      Electronically signed by: Ellen Nobles MD  Date:    02/28/2025  Time:    18:32               Narrative:    EXAMINATION:  XR CHEST AP PORTABLE    CLINICAL HISTORY:  Pneumothorax    COMPARISON:  Chest, 02/25/2025    FINDINGS:  One view.  Heart is normal in size.  Stable coarse bilateral interstitial lung markings similar to previous exam.    Small right apical pneumothorax.  Right pleural catheter is present.  Right pneumothorax appears similar may be slightly larger than on previous exam.                                            The Emergency Provider reviewed the vital signs and test results, which are outlined above.     ED Discussion   ED Medication(s):  Medications - No data to display                 18:00 Care to Dr. CLAUDIA Ruiz.         Medical Decision Making             Patient is status post VATS for chemical pleurodesis on the 21st released from the hospital on the 25th.  He presents to the department this evening with concern regarding drainage around the chest tube.  He demonstrates a significant amount of serosanguineous output at the site but the site itself is free of any erythema, edema, or induration.  Site has been cleaned and redressed.  The patient's Pleur-evac similarly demonstrates serosanguineous drainage without any suspicion for empyema at this point.  Patient demonstrates no evidence respiratory difficulty.  I did review the results of his chest x-ray which shows slight enlargement of the  pneumothorax on the right with his cardiothoracic surgeon.  Dr. Moss discusses that patient does not have any real material options for intervention at this point aside from ongoing use of the chest tube.  He does note that the patient has a follow-up appointment with pulmonology this upcoming week to discuss possible lung transplant feels that the patient's long-term prognosis is fairly poor.  Patient also has scheduled follow-up this coming week with Dr. Moss himself Thursday.    On final assessment, the patient appears suitable for discharge.  I see no indication of an emergent process beyond that addressed during our encounter but have duly counseled the patient/family regarding the need for prompt follow-up as well as the indications that should prompt immediate return to the emergency room.  The patient/family has been provided with language -specific verbal and printed direction regarding our final diagnosis(es) as well as instructions regarding use of OTC and/or Rx medications intended to manage the patient's aforementioned conditions including:  ED Prescriptions    None           Patient has been advised of the following recommended follow-up instructions:  Follow-up Information       Follow up With Specialties Details Why Contact Info    Tim Moss MD Cardiothoracic Surgery Go on 3/6/2025 As scheduled, for reassessment [address] [place] [address] Dr Christiano PHILLIPS [id number]  [phone]      Kun Abdul MD Pulmonary Disease Go on 3/5/2025 for reassessment [address] [place] [address] DR Christiano PHILLIPS [address]  [phone]      [facility] - Emergency Dept Emergency Medicine Go to  As needed, If symptoms worsen [address]  Emergency Department  [facility] [id number] [phone]          The patient/family communicates understanding of all this information and all remaining questions and concerns were addressed at this time.            Medical Decision Making  Amount and/or Complexity of Data  "Reviewed  Labs: ordered.  Radiology: ordered.        Coding    Referrals:  No orders of the defined types were placed in this encounter.      Prescription Management: I performed a review of the patient's current Rx medication list as input by nursing staff.    Discharge Medication List as of 2/28/2025  7:33 PM        CONTINUE these medications which have NOT CHANGED    Details   allopurinol (ZYLOPRIM) 100 MG tablet Take 100 mg by mouth 3 (three) times daily., Until Discontinued, Historical Med      alprazolam (XANAX) 1 MG tablet Take 2 mg by mouth nightly as needed for Anxiety. , Until Discontinued, Historical Med      clonazePAM (KLONOPIN) 1 MG tablet Take 1 mg by mouth 2 (two) times daily as needed for Anxiety., Historical Med      gabapentin (NEURONTIN) 100 MG capsule Take 100 mg by mouth as needed., Until Discontinued, Historical Med      lisdexamfetamine (VYVANSE) 30 MG capsule Take 30 mg by mouth every morning., Until Discontinued, Historical Med      methocarbamol (ROBAXIN) 500 MG Tab Take 500 mg by mouth., Historical Med      methylPREDNISolone (MEDROL DOSEPACK) 4 mg tablet As per package label, Normal      naproxen (NAPROSYN) 500 MG tablet Take 1 tablet (500 mg total) by mouth 2 (two) times daily with meals., Starting 12/2/2016, Until Discontinued, Print      oxyCODONE-acetaminophen (PERCOCET)  mg per tablet Take 1 tablet by mouth every 6 (six) hours as needed for Pain., Starting Thu 2/27/2025, Normal                Portions of this note may have been created with voice recognition software. Occasional "wrong-word" or "sound-a-like" substitutions may have occurred due to the inherent limitations of voice recognition software. Please, read the note carefully and recognize, using context, where substitutions have occurred.          Clinical Impression       ICD-10-CM ICD-9-CM   1. Pneumothorax on right  J93.9 512.89   2. Wound drainage  T14.8XXA 879.8   3. Chest tube in place  Z96.89 V49.89 "                  Bill Ruiz MD  03/01/25 0579

## 2025-03-01 LAB
HCV AB SERPL QL IA: NORMAL
HIV 1+2 AB+HIV1 P24 AG SERPL QL IA: NORMAL

## 2025-03-01 NOTE — ED NOTES
Sited assessed with Ben IBARRA at bedside; no s/s of infection noted at site; no redness noted; no swelling noted; no abnormal temperature noted to site; no abnormal drainage noted from site; site cleaned and redressed per MD orders

## 2025-03-02 ENCOUNTER — NURSE TRIAGE (OUTPATIENT)
Dept: ADMINISTRATIVE | Facility: CLINIC | Age: 60
End: 2025-03-02
Payer: MEDICARE

## 2025-03-03 NOTE — TELEPHONE ENCOUNTER
"Pt states "have this tube in me." Pt states leaking, "pieces of flesh in tube, like pieces of lung." Pt states this is new symptom today, also increase in pain. Pt noted to become very short of breath during call. Per protocol, go to ED/UC now. Pt refuses, states will wait to hear from MD tomorrow. Reiterated disposition and advised pt to call back for any further questions or concerns.    Reason for Disposition   Sounds like a serious complication to the triager    Additional Information   Negative: Sounds like a life-threatening emergency to the triager   Negative: [1] Widespread rash AND [2] bright red, sunburn-like   Negative: [1] SEVERE headache AND [2] after spinal (epidural) anesthesia   Negative: [1] Vomiting AND [2] persists > 4 hours   Negative: [1] Vomiting AND [2] abdomen looks much more swollen than usual   Negative: [1] Drinking very little AND [2] dehydration suspected (e.g., no urine > 12 hours, very dry mouth, very lightheaded)   Negative: Patient sounds very sick or weak to the triager    Protocols used: Post-Op Symptoms and [facility]    "

## 2025-03-04 DIAGNOSIS — J93.83 SPONTANEOUS PNEUMOTHORAX: ICD-10-CM

## 2025-03-04 RX ORDER — OXYCODONE AND ACETAMINOPHEN 10; 325 MG/1; MG/1
1 TABLET ORAL EVERY 6 HOURS PRN
Qty: 20 TABLET | Refills: 0 | Status: CANCELLED | OUTPATIENT
Start: 2025-03-04

## 2025-03-04 NOTE — TELEPHONE ENCOUNTER
----- Message from Roxann sent at 3/4/2025  1:04 PM CST -----  Contact: self  Type:  RX Refill RequestWho Called: Octavio PangRefill or New Rx:refillRX Name and Strength:oxyCODONE-acetaminophen (PERCOCET)  mg per tabletHow is the patient currently taking it? (ex. 1XDay):every 4-6 hoursIs this a 30 day or 90 day RX:[facility] [address]Wxidk: [phone] Fax: [phone] or Mail Order:localOrdering Provider:Jessie the patient rather a call back or a response via [facility]? Call [facility] Call Back Number:[phone] Information: the patient is close to finishing his prescription and is still in a lot of pain he says the relief only last about 3 hour. He would like the prescription to go the [place]. He only has 4 left. the patient says if his can't get the medication he will be taking something to help with the pain.

## 2025-03-05 ENCOUNTER — HOSPITAL ENCOUNTER (OUTPATIENT)
Dept: RADIOLOGY | Facility: HOSPITAL | Age: 60
Discharge: HOME | End: 2025-03-05
Attending: INTERNAL MEDICINE
Payer: MEDICARE

## 2025-03-05 ENCOUNTER — OFFICE VISIT (OUTPATIENT)
Dept: PULMONOLOGY | Facility: CLINIC | Age: 60
End: 2025-03-05
Payer: MEDICARE

## 2025-03-05 VITALS
DIASTOLIC BLOOD PRESSURE: 62 MMHG | OXYGEN SATURATION: 97 % | WEIGHT: 125.56 LBS | RESPIRATION RATE: 18 BRPM | BODY MASS INDEX: 19.03 KG/M2 | HEIGHT: 68 IN | HEART RATE: 118 BPM | SYSTOLIC BLOOD PRESSURE: 116 MMHG

## 2025-03-05 DIAGNOSIS — Z96.89 CHEST TUBE IN PLACE: ICD-10-CM

## 2025-03-05 DIAGNOSIS — J44.9 CHRONIC OBSTRUCTIVE PULMONARY DISEASE, UNSPECIFIED COPD TYPE: ICD-10-CM

## 2025-03-05 DIAGNOSIS — F17.210 TOBACCO SMOKER, 1 PACK OF CIGARETTES OR LESS PER DAY: ICD-10-CM

## 2025-03-05 DIAGNOSIS — J93.9 BILATERAL PNEUMOTHORACES: ICD-10-CM

## 2025-03-05 DIAGNOSIS — Z96.89 CHEST TUBE IN PLACE: Primary | ICD-10-CM

## 2025-03-05 DIAGNOSIS — R93.89 ABNORMAL CT OF THE CHEST: ICD-10-CM

## 2025-03-05 PROCEDURE — 3008F BODY MASS INDEX DOCD: CPT | Mod: CPTII,S$GLB,, | Performed by: INTERNAL MEDICINE

## 2025-03-05 PROCEDURE — 3074F SYST BP LT 130 MM HG: CPT | Mod: CPTII,S$GLB,, | Performed by: INTERNAL MEDICINE

## 2025-03-05 PROCEDURE — 71046 X-RAY EXAM CHEST 2 VIEWS: CPT | Mod: TC

## 2025-03-05 PROCEDURE — 71046 X-RAY EXAM CHEST 2 VIEWS: CPT | Mod: 26,,, | Performed by: RADIOLOGY

## 2025-03-05 PROCEDURE — 99204 OFFICE O/P NEW MOD 45 MIN: CPT | Mod: S$GLB,,, | Performed by: INTERNAL MEDICINE

## 2025-03-05 PROCEDURE — 3078F DIAST BP <80 MM HG: CPT | Mod: CPTII,S$GLB,, | Performed by: INTERNAL MEDICINE

## 2025-03-05 PROCEDURE — 99999 PR PBB SHADOW E&M-EST. PATIENT-LVL IV: CPT | Mod: PBBFAC,,, | Performed by: INTERNAL MEDICINE

## 2025-03-05 PROCEDURE — 1159F MED LIST DOCD IN RCRD: CPT | Mod: CPTII,S$GLB,, | Performed by: INTERNAL MEDICINE

## 2025-03-05 PROCEDURE — 1111F DSCHRG MED/CURRENT MED MERGE: CPT | Mod: CPTII,S$GLB,, | Performed by: INTERNAL MEDICINE

## 2025-03-05 PROCEDURE — 1160F RVW MEDS BY RX/DR IN RCRD: CPT | Mod: CPTII,S$GLB,, | Performed by: INTERNAL MEDICINE

## 2025-03-05 RX ORDER — VARENICLINE TARTRATE 1 MG/1
1 TABLET, FILM COATED ORAL
COMMUNITY
Start: 2025-02-17 | End: 2025-05-18

## 2025-03-05 RX ORDER — ALBUTEROL SULFATE 90 UG/1
2 INHALANT RESPIRATORY (INHALATION) EVERY 6 HOURS PRN
COMMUNITY
Start: 2025-02-17

## 2025-03-05 RX ORDER — METHYLPHENIDATE HYDROCHLORIDE EXTENDED RELEASE 10 MG/1
10 TABLET ORAL
COMMUNITY
Start: 2025-02-17 | End: 2025-03-19

## 2025-03-05 RX ORDER — VARENICLINE TARTRATE 0.5 (11)-1
KIT ORAL
COMMUNITY
Start: 2025-02-17 | End: 2025-05-18

## 2025-03-05 RX ORDER — LANOLIN ALCOHOL/MO/W.PET/CERES
1 CREAM (GRAM) TOPICAL EVERY MORNING
COMMUNITY

## 2025-03-05 NOTE — PROGRESS NOTES
Initial Outpatient Pulmonary Evaluation       SUBJECTIVE:     Chief Complaint   Patient presents with    Abnormal Ct Scan    Chest Injury     Pneumothorax         History of Present Illness:    Patient is a 59 y.o. male with significant history of smoking, welding, emphysema COPD and pulmonary fibrosis complicated by bilateral pneumothoraces February 2025 status post VATS mechanical and chemical pleurodesis and a chest tube to water-seal on discharge comes for follow-up.      Brown fluid collection noted in the chest tube Vacutainer on the right side of his chest.    Has albuterol p.r.n..    Review of Systems   Respiratory:  Positive for cough, shortness of breath, dyspnea on extertion and use of rescue inhaler.        Review of patient's allergies indicates:   Allergen Reactions    Ancef [cefazolin] Other (See Comments)     Headaches and dizziness       Current Medications[1]    Past Medical History:   Diagnosis Date    ADD (attention deficit disorder)     Anxiety     Back pain     Depression     Gout     Neuromuscular disorder      Past Surgical History:   Procedure Laterality Date    BACK SURGERY  10/13/2016    INJECTION OF ANESTHETIC AGENT AROUND MULTIPLE INTERCOSTAL NERVES Right 2/21/2025    Procedure: BLOCK, NERVE, INTERCOSTAL, 2 OR MORE;  Surgeon: Tim Moss MD;  Location: [facility] OR;  Service: Cardiothoracic;  Laterality: Right;  MULTIPLE INTERCOSTAL NERVE BLOCKS    NECK SURGERY      PLEURODESIS WITH VIDEO-ASSISTED THORACOSCOPIC SURGERY (VATS) Right 2/21/2025    Procedure: VATS, WITH PLEURODESIS;  Surgeon: Tim Moss MD;  Location: [facility] OR;  Service: Cardiothoracic;  Laterality: Right;  CHEMICAL AND MECHANICAL PLEURODESIS     Family History   Problem Relation Name Age of Onset    Arthritis Father      Pulmonary fibrosis Mother       Social History[2]       OBJECTIVE:     Vital Signs (Most Recent)  Vital Signs  Pulse: (!) 118  Resp: 18  SpO2: 97 %  BP:  "116/62  Pain Score:   7  Pain Loc: Chest  Height and Weight  Height: 5' 8" (172.7 cm)  Weight: 56.9 kg (125 lb 8.8 oz)  BSA (Calculated - sq m): 1.65 sq meters  BMI (Calculated): 19.1  Weight in (lb) to have BMI = 25: 164.1]  Wt Readings from Last 2 Encounters:   03/05/25 56.9 kg (125 lb 8.8 oz)   02/28/25 56.9 kg (125 lb 7.1 oz)         Physical Exam:  Physical Exam   Constitutional: He appears well-developed. He appears cachectic.   Pulmonary/Chest: Normal expansion and effort normal. No respiratory distress. He has decreased breath sounds. He has no wheezes. He has rhonchi.   Right-sided chest tube with brown fluid collection noted       Laboratory  Lab Results   Component Value Date    WBC 9.38 02/28/2025    RBC 4.03 (L) 02/28/2025    HGB 12.2 (L) 02/28/2025    HCT 36.4 (L) 02/28/2025    MCV 90 02/28/2025    MCH 30.3 02/28/2025    MCHC 33.5 02/28/2025    RDW 13.1 02/28/2025     (H) 02/28/2025    MPV 8.9 (L) 02/28/2025    GRAN 6.0 02/28/2025    GRAN 63.8 02/28/2025    LYMPH 1.4 02/28/2025    LYMPH 14.5 (L) 02/28/2025    MONO 1.2 (H) 02/28/2025    MONO 13.0 02/28/2025    EOS 0.7 (H) 02/28/2025    BASO 0.07 02/28/2025    EOSINOPHIL 7.4 02/28/2025    BASOPHIL 0.7 02/28/2025       BMP  Lab Results   Component Value Date     02/28/2025    K 4.1 02/28/2025     02/28/2025    CO2 26 02/28/2025    BUN 17 02/28/2025    CREATININE 0.7 02/28/2025    CALCIUM 9.4 02/28/2025    ANIONGAP 10 02/28/2025    ESTGFRAFRICA >60.0 03/01/2018    EGFRNONAA >60.0 03/01/2018    AST 21 02/28/2025    ALT 20 02/28/2025    PROT 8.4 02/28/2025       Lab Results   Component Value Date    BNP <10 02/18/2025       Lab Results   Component Value Date    TSH 1.94 08/19/2024       No results found for: "SEDRATE"    No results found for: "CRP"    No results found for: "IGE"    No results found for: "ASPERGILLUS"  No results found for: "AFUMIGATUSCL"     No results found for: "ACE"    Diagnostic Results:  I have personally reviewed " today the following studies :    CT scan of the chest reviewed.    ASSESSMENT/PLAN:     Chest tube in place  -     X-Ray Chest PA And Lateral; Future; Expected date: 03/05/2025    Bilateral pneumothoraces  -     Ambulatory referral/consult to Pulmonology    Tobacco smoker, 1 pack of cigarettes or less per day  -     Ambulatory referral/consult to Pulmonology    Abnormal CT of the chest  -     Ambulatory referral/consult to Pulmonology    Chronic obstructive pulmonary disease, unspecified COPD type      Continue albuterol p.r.n. smoking cessation.  Check chest x-ray today.  Follow up with Cardiothoracic surgery tomorrow.  Follow up in about 3 weeks (around 3/26/2025).    This note was prepared using voice recognition system and is likely to have sound alike errors that may have been overlooked even after proof reading.  Please call me with any questions    Discussed diagnosis, its evaluation, treatment and usual course. All questions answered.    Thank you for the courtesy of participating in the care of this patient    Kun Abdul MD               [1]   Current Outpatient Medications   Medication Sig Dispense Refill    albuterol (PROVENTIL/VENTOLIN HFA) 90 mcg/actuation inhaler Inhale 2 puffs into the lungs every 6 (six) hours as needed.      methylphenidate HCl (METADATE ER) 10 MG ER tablet Take 10 mg by mouth.      varenicline tartrate (CHANTIX EILEEN) 0.5 mg (11)- 1 mg (42) tablet use as directed      varenicline tartrate (CHANTIX) 1 mg Tab Take 1 mg by mouth.      allopurinol (ZYLOPRIM) 100 MG tablet Take 100 mg by mouth 3 (three) times daily.      alprazolam (XANAX) 1 MG tablet Take 2 mg by mouth nightly as needed for Anxiety.       clonazePAM (KLONOPIN) 1 MG tablet Take 1 mg by mouth 2 (two) times daily as needed for Anxiety.      cyanocobalamin (VITAMIN B-12) 1000 MCG tablet Take 1 tablet by mouth every morning.      gabapentin (NEURONTIN) 100 MG capsule Take 100 mg by mouth as needed.       lisdexamfetamine (VYVANSE) 30 MG capsule Take 30 mg by mouth every morning.      methocarbamol (ROBAXIN) 500 MG Tab Take 500 mg by mouth.      methylPREDNISolone (MEDROL DOSEPACK) 4 mg tablet As per package label 1 each 0    naproxen (NAPROSYN) 500 MG tablet Take 1 tablet (500 mg total) by mouth 2 (two) times daily with meals. 60 tablet 0    oxyCODONE-acetaminophen (PERCOCET)  mg per tablet Take 1 tablet by mouth every 8 (eight) hours as needed for pain 30 tablet 0     No current facility-administered medications for this visit.   [2]   Social History  Tobacco Use    Smoking status: Some Days     Current packs/day: 1.00     Types: Cigarettes    Smokeless tobacco: Never   Substance Use Topics    Alcohol use: Yes     Comment: occassionally     Drug use: No

## 2025-03-06 ENCOUNTER — OFFICE VISIT (OUTPATIENT)
Dept: CARDIOTHORACIC SURGERY | Facility: CLINIC | Age: 60
End: 2025-03-06
Payer: MEDICARE

## 2025-03-06 VITALS
SYSTOLIC BLOOD PRESSURE: 122 MMHG | HEART RATE: 120 BPM | HEIGHT: 68 IN | OXYGEN SATURATION: 98 % | DIASTOLIC BLOOD PRESSURE: 78 MMHG | BODY MASS INDEX: 19.08 KG/M2 | WEIGHT: 125.88 LBS

## 2025-03-06 DIAGNOSIS — M96.1 POST LAMINECTOMY SYNDROME: ICD-10-CM

## 2025-03-06 DIAGNOSIS — J93.83 SPONTANEOUS PNEUMOTHORAX: Primary | ICD-10-CM

## 2025-03-06 DIAGNOSIS — F41.9 ANXIETY: ICD-10-CM

## 2025-03-06 DIAGNOSIS — R93.89 ABNORMAL CT OF THE CHEST: ICD-10-CM

## 2025-03-06 DIAGNOSIS — F17.210 TOBACCO SMOKER, 1 PACK OF CIGARETTES OR LESS PER DAY: ICD-10-CM

## 2025-03-06 PROCEDURE — 99999 PR PBB SHADOW E&M-EST. PATIENT-LVL III: CPT | Mod: PBBFAC,,, | Performed by: THORACIC SURGERY (CARDIOTHORACIC VASCULAR SURGERY)

## 2025-03-06 NOTE — PROGRESS NOTES
"Subjective:      Patient ID: Octavio Pang is a 59 y.o. male.    Chief Complaint: post op Pleurodesis with video-assisted thoracoscopic surge (Chest tube in rt chest wall pbr)    HPI:  59-year-old smoker COPD and a spontaneous pneumothorax bilateral extensive emphysema sent home with a Heimlich valve is here for the follow-up visit the patient is complaining of pain and discharge at the chest tube insertion site no fever no difficulty in breathing at this time patient on room air    Review of patient's allergies indicates:   Allergen Reactions    Ancef [cefazolin] Other (See Comments)     Headaches and dizziness       Review of Systems   Constitutional:  Negative for activity change and appetite change.   HENT:  Negative for dental problem, nosebleeds and sore throat.    Eyes:  Negative for discharge and visual disturbance.   Respiratory:  Positive for cough, chest tightness, shortness of breath and wheezing. Negative for stridor.    Cardiovascular:  Negative for leg swelling.   Gastrointestinal:  Negative for abdominal distention and abdominal pain.   Genitourinary:  Negative for difficulty urinating and dysuria.   Musculoskeletal:  Negative for arthralgias, back pain and joint swelling.   Allergic/Immunologic: Negative for environmental allergies.   Neurological:  Negative for dizziness, syncope and headaches.   Hematological:  Does not bruise/bleed easily.   Psychiatric/Behavioral:  Negative for behavioral problems.           Objective:   /78   Pulse (!) 120   Ht 5' 8" (1.727 m)   Wt 57.1 kg (125 lb 14.1 oz)   SpO2 98%   BMI 19.14 kg/m²     X-Ray Chest PA And Lateral  Narrative: EXAM:  XR CHEST PA AND LATERAL    CLINICAL HISTORY: [Z96.89]-Presence of other specified functional implants.    COMPARISON: 02/28/2025    FINDINGS: A right-sided chest tube remains in place.  There is a small right hydropneumothorax.  The borders of the heart are not well-seen.  There is a moderate amount of haziness scattered " throughout both lungs.  There has been slight interval worsening of the appearance of the lungs.  Impression: 1.  A right-sided chest tube remains in place.  There is a small right hydropneumothorax.  2.  There is a moderate amount of haziness scattered throughout both lungs.  There has been slight interval worsening of the appearance of the lungs.  This is characteristic of pneumonia.    Finalized on: 3/5/2025 6:04 PM By:  Jabari Almendarez MD  [facility]# [id number]      2025-03-05 18:06:09.566     [facility]         Physical Exam  Vitals reviewed.   Constitutional:       Appearance: Normal appearance.   HENT:      Head: Normocephalic and atraumatic.      Mouth/Throat:      Mouth: Mucous membranes are moist.   Eyes:      Extraocular Movements: Extraocular movements intact.   Cardiovascular:      Rate and Rhythm: Normal rate and regular rhythm.      Pulses: Normal pulses.      Heart sounds: Normal heart sounds.   Pulmonary:      Effort: Pulmonary effort is normal.      Breath sounds: Rhonchi and rales present.   Abdominal:      Palpations: Abdomen is soft.   Musculoskeletal:         General: Normal range of motion.      Cervical back: Normal range of motion and neck supple.   Skin:     General: Skin is warm.      Capillary Refill: Capillary refill takes less than 2 seconds.   Neurological:      General: No focal deficit present.      Mental Status: He is alert and oriented to person, place, and time.   Psychiatric:         Mood and Affect: Mood normal.         Assessment:     1. Spontaneous pneumothorax    2. Tobacco smoker, 1 pack of cigarettes or less per day    3. Post laminectomy syndrome    4. Anxiety    5. Abnormal CT of the chest          Plan   Spontaneous pneumothorax with severe emphysema the patient sent home with a Heimlich valve continue the chest tube wound care  Incentive spirometry  I will see him back in 1 week time with a chest x-ray.          Tim Moss MD  [facility]  [place]

## 2025-03-07 ENCOUNTER — RESULTS FOLLOW-UP (OUTPATIENT)
Dept: PULMONOLOGY | Facility: CLINIC | Age: 60
End: 2025-03-07

## 2025-03-12 RX ORDER — OXYCODONE AND ACETAMINOPHEN 10; 325 MG/1; MG/1
1 TABLET ORAL EVERY 8 HOURS PRN
Qty: 30 TABLET | Refills: 0 | Status: CANCELLED | OUTPATIENT
Start: 2025-03-12

## 2025-03-13 ENCOUNTER — OFFICE VISIT (OUTPATIENT)
Dept: CARDIOTHORACIC SURGERY | Facility: CLINIC | Age: 60
End: 2025-03-13
Payer: MEDICARE

## 2025-03-13 ENCOUNTER — HOSPITAL ENCOUNTER (OUTPATIENT)
Dept: RADIOLOGY | Facility: HOSPITAL | Age: 60
Discharge: HOME | End: 2025-03-13
Attending: THORACIC SURGERY (CARDIOTHORACIC VASCULAR SURGERY)
Payer: MEDICARE

## 2025-03-13 VITALS
RESPIRATION RATE: 16 BRPM | OXYGEN SATURATION: 96 % | SYSTOLIC BLOOD PRESSURE: 114 MMHG | WEIGHT: 119.38 LBS | BODY MASS INDEX: 18.09 KG/M2 | HEIGHT: 68 IN | HEART RATE: 113 BPM | DIASTOLIC BLOOD PRESSURE: 77 MMHG

## 2025-03-13 DIAGNOSIS — Z48.89 ENCOUNTER FOR POSTOPERATIVE CARE RELATED TO SURGICAL JOINT FUSION: ICD-10-CM

## 2025-03-13 DIAGNOSIS — J93.83 SPONTANEOUS PNEUMOTHORAX: Primary | ICD-10-CM

## 2025-03-13 DIAGNOSIS — J93.83 SPONTANEOUS PNEUMOTHORAX: ICD-10-CM

## 2025-03-13 DIAGNOSIS — F17.210 TOBACCO SMOKER, 1 PACK OF CIGARETTES OR LESS PER DAY: ICD-10-CM

## 2025-03-13 DIAGNOSIS — Z98.1 ENCOUNTER FOR POSTOPERATIVE CARE RELATED TO SURGICAL JOINT FUSION: ICD-10-CM

## 2025-03-13 DIAGNOSIS — R93.89 ABNORMAL CT OF THE CHEST: ICD-10-CM

## 2025-03-13 DIAGNOSIS — F41.9 ANXIETY: ICD-10-CM

## 2025-03-13 DIAGNOSIS — M96.1 POST LAMINECTOMY SYNDROME: ICD-10-CM

## 2025-03-13 PROCEDURE — 71046 X-RAY EXAM CHEST 2 VIEWS: CPT | Mod: TC

## 2025-03-13 PROCEDURE — 71046 X-RAY EXAM CHEST 2 VIEWS: CPT | Mod: 26,,, | Performed by: RADIOLOGY

## 2025-03-13 PROCEDURE — 99999 PR PBB SHADOW E&M-EST. PATIENT-LVL III: CPT | Mod: PBBFAC,,, | Performed by: THORACIC SURGERY (CARDIOTHORACIC VASCULAR SURGERY)

## 2025-03-13 RX ORDER — OXYCODONE HYDROCHLORIDE 5 MG/1
5 TABLET ORAL EVERY 4 HOURS PRN
Qty: 10 TABLET | Refills: 0 | Status: SHIPPED | OUTPATIENT
Start: 2025-03-13

## 2025-03-13 NOTE — PROGRESS NOTES
"Subjective:      Patient ID: Octavio Pang is a 59 y.o. male.    Chief Complaint: No chief complaint on file.    HPI:  59-year-old the patient with a severe COPD spontaneous pneumothorax bilateral pannus in her emphysema chronic smoking was discharged home with a Heimlich valve is here for the 2nd follow up visit his Heimlich valve has stopped air leaking.  X-ray today shows postsurgical changes with a small apical airspace.    Review of patient's allergies indicates:   Allergen Reactions    Ancef [cefazolin] Other (See Comments)     Headaches and dizziness       Review of Systems   Constitutional:  Negative for activity change and appetite change.   HENT:  Negative for dental problem, nosebleeds and sore throat.    Eyes:  Negative for discharge and visual disturbance.   Respiratory:  Positive for cough, choking, chest tightness, shortness of breath and wheezing. Negative for stridor.    Cardiovascular:  Negative for leg swelling.   Gastrointestinal:  Negative for abdominal distention and abdominal pain.   Genitourinary:  Negative for difficulty urinating and dysuria.   Musculoskeletal:  Negative for arthralgias, back pain and joint swelling.   Allergic/Immunologic: Negative for environmental allergies.   Neurological:  Negative for dizziness, syncope and headaches.   Hematological:  Does not bruise/bleed easily.   Psychiatric/Behavioral:  Negative for behavioral problems.           Objective:   /77   Pulse (!) 113   Resp 16   Ht 5' 8" (1.727 m)   Wt 54.2 kg (119 lb 6.1 oz)   SpO2 96%   BMI 18.15 kg/m²     X-Ray Chest PA And Lateral  Narrative: EXAM: XR CHEST PA AND LATERAL    CLINICAL HISTORY:  Pneumothorax    TECHNIQUE: 2 views of the chest.    COMPARISON: 03/05/2025 chest x-ray    FINDINGS:  Right chest tube with tip near the right lung apex is unchanged.  Stable small right apical pneumothorax.    Chronic interstitial scarring/fibrosis stable.  Normal heart size.    Old rib fractures.  Impression:  " Stable chest x-ray.    Finalized on: 3/13/2025 3:29 PM By:  Tenzin Brasher MD  [facility]# [id number]      2025-03-13 15:31:12.746     [facility]         Physical Exam  Vitals reviewed.   Constitutional:       Appearance: Normal appearance.   HENT:      Head: Normocephalic and atraumatic.      Mouth/Throat:      Mouth: Mucous membranes are moist.   Eyes:      Extraocular Movements: Extraocular movements intact.   Cardiovascular:      Rate and Rhythm: Normal rate and regular rhythm.      Pulses: Normal pulses.      Heart sounds: Normal heart sounds.   Pulmonary:      Effort: Pulmonary effort is normal.      Breath sounds: Rhonchi and rales present.   Abdominal:      Palpations: Abdomen is soft.   Musculoskeletal:         General: Normal range of motion.      Cervical back: Normal range of motion and neck supple.   Skin:     General: Skin is warm.      Capillary Refill: Capillary refill takes less than 2 seconds.   Neurological:      General: No focal deficit present.      Mental Status: He is alert and oriented to person, place, and time.   Psychiatric:         Mood and Affect: Mood normal.     Chest x-ray shows stable apical airspace pneumothorax unchanged from last study a week ago    Assessment:     1. Spontaneous pneumothorax    2. Tobacco smoker, 1 pack of cigarettes or less per day    3. Post laminectomy syndrome    4. Abnormal CT of the chest    5. Anxiety    6. Encounter for postoperative care related to surgical joint fusion          Plan   We will discontinue the chest tube today.  Continue aggressive pulmonary toilet incentive spirometry  Follow up with pulmonology  The patient has a high chance of pneumothorax on the contralateral side.  He has been informed about the warning signs of pneumothorax to get timely help in the emergency room if needed.  I will discharge the patient from my care today          Tim Moss MD  [facility]  [place]

## 2025-03-19 ENCOUNTER — PATIENT MESSAGE (OUTPATIENT)
Dept: PULMONOLOGY | Facility: CLINIC | Age: 60
End: 2025-03-19
Payer: MEDICARE

## 2025-03-19 ENCOUNTER — NURSE TRIAGE (OUTPATIENT)
Dept: ADMINISTRATIVE | Facility: CLINIC | Age: 60
End: 2025-03-19
Payer: MEDICARE

## 2025-03-19 NOTE — TELEPHONE ENCOUNTER
Pt with SOB for >1 week and worsening every day.  Care advice states to go to the ED now.  Patient verbally understands, all questions answered, advised to call back for any worsening symptoms or further needs.     Reason for Disposition   [1] MODERATE difficulty breathing (e.g., speaks in phrases, SOB even at rest, pulse 100-120) AND [2] NEW-onset or WORSE than normal    Additional Information   Difficulty breathing   Negative: SEVERE difficulty breathing (e.g., struggling for each breath, speaks in single words)   Negative: [1] Breathing stopped AND [2] hasn't returned   Negative: Choking on something   Negative: Bluish (or gray) lips or face now   Negative: Difficult to awaken or acting confused (e.g., disoriented, slurred speech)   Negative: Passed out (e.g., fainted, lost consciousness, blacked out and was not responding)   Negative: Wheezing started suddenly after medicine, an allergic food or bee sting   Negative: Stridor (harsh sound while breathing in)   Negative: Slow, shallow and weak breathing   Negative: Sounds like a life-threatening emergency to the triager    Protocols used: Post-Op Symptoms and [id number], Breathing Difficulty-A-AH

## 2025-03-21 ENCOUNTER — HOSPITAL ENCOUNTER (EMERGENCY)
Facility: HOSPITAL | Age: 60
Discharge: HOME | End: 2025-03-21
Attending: EMERGENCY MEDICINE
Payer: MEDICARE

## 2025-03-21 VITALS
HEART RATE: 98 BPM | TEMPERATURE: 98 F | OXYGEN SATURATION: 97 % | SYSTOLIC BLOOD PRESSURE: 110 MMHG | DIASTOLIC BLOOD PRESSURE: 61 MMHG | RESPIRATION RATE: 20 BRPM

## 2025-03-21 DIAGNOSIS — J84.10 PULMONARY FIBROSIS: Primary | ICD-10-CM

## 2025-03-21 DIAGNOSIS — R06.00 DYSPNEA, UNSPECIFIED TYPE: ICD-10-CM

## 2025-03-21 LAB
ALBUMIN SERPL BCP-MCNC: 2.8 G/DL (ref 3.5–5.2)
ALP SERPL-CCNC: 87 U/L (ref 40–150)
ALT SERPL W/O P-5'-P-CCNC: 15 U/L (ref 10–44)
ANION GAP SERPL CALC-SCNC: 12 MMOL/L (ref 8–16)
AST SERPL-CCNC: 16 U/L (ref 10–40)
BASOPHILS # BLD AUTO: 0.07 K/UL (ref 0–0.2)
BASOPHILS NFR BLD: 0.7 % (ref 0–1.9)
BILIRUB SERPL-MCNC: 0.2 MG/DL (ref 0.1–1)
BNP SERPL-MCNC: <10 PG/ML (ref 0–99)
BUN SERPL-MCNC: 10 MG/DL (ref 6–20)
CALCIUM SERPL-MCNC: 9.3 MG/DL (ref 8.7–10.5)
CHLORIDE SERPL-SCNC: 101 MMOL/L (ref 95–110)
CO2 SERPL-SCNC: 24 MMOL/L (ref 23–29)
CREAT SERPL-MCNC: 0.7 MG/DL (ref 0.5–1.4)
DIFFERENTIAL METHOD BLD: ABNORMAL
EOSINOPHIL # BLD AUTO: 1 K/UL (ref 0–0.5)
EOSINOPHIL NFR BLD: 11 % (ref 0–8)
ERYTHROCYTE [DISTWIDTH] IN BLOOD BY AUTOMATED COUNT: 13.2 % (ref 11.5–14.5)
EST. GFR  (NO RACE VARIABLE): >60 ML/MIN/1.73 M^2
GLUCOSE SERPL-MCNC: 88 MG/DL (ref 70–110)
HCT VFR BLD AUTO: 35.5 % (ref 40–54)
HGB BLD-MCNC: 11.9 G/DL (ref 14–18)
IMM GRANULOCYTES # BLD AUTO: 0.05 K/UL (ref 0–0.04)
IMM GRANULOCYTES NFR BLD AUTO: 0.5 % (ref 0–0.5)
LYMPHOCYTES # BLD AUTO: 1.2 K/UL (ref 1–4.8)
LYMPHOCYTES NFR BLD: 12.4 % (ref 18–48)
MCH RBC QN AUTO: 30.1 PG (ref 27–31)
MCHC RBC AUTO-ENTMCNC: 33.5 G/DL (ref 32–36)
MCV RBC AUTO: 90 FL (ref 82–98)
MONOCYTES # BLD AUTO: 1.1 K/UL (ref 0.3–1)
MONOCYTES NFR BLD: 11.5 % (ref 4–15)
NEUTROPHILS # BLD AUTO: 6 K/UL (ref 1.8–7.7)
NEUTROPHILS NFR BLD: 63.9 % (ref 38–73)
NRBC BLD-RTO: 0 /100 WBC
PLATELET # BLD AUTO: 601 K/UL (ref 150–450)
PMV BLD AUTO: 8.2 FL (ref 9.2–12.9)
POTASSIUM SERPL-SCNC: 3.9 MMOL/L (ref 3.5–5.1)
PROT SERPL-MCNC: 9.1 G/DL (ref 6–8.4)
RBC # BLD AUTO: 3.96 M/UL (ref 4.6–6.2)
SODIUM SERPL-SCNC: 137 MMOL/L (ref 136–145)
TROPONIN I SERPL DL<=0.01 NG/ML-MCNC: <0.006 NG/ML (ref 0–0.03)
WBC # BLD AUTO: 9.36 K/UL (ref 3.9–12.7)

## 2025-03-21 PROCEDURE — 93010 ELECTROCARDIOGRAM REPORT: CPT | Mod: ,,, | Performed by: INTERNAL MEDICINE

## 2025-03-21 PROCEDURE — 83880 ASSAY OF NATRIURETIC PEPTIDE: CPT | Mod: ER | Performed by: EMERGENCY MEDICINE

## 2025-03-21 PROCEDURE — 84484 ASSAY OF TROPONIN QUANT: CPT | Mod: ER | Performed by: EMERGENCY MEDICINE

## 2025-03-21 PROCEDURE — 85025 COMPLETE CBC W/AUTO DIFF WBC: CPT | Mod: ER | Performed by: EMERGENCY MEDICINE

## 2025-03-21 PROCEDURE — 94761 N-INVAS EAR/PLS OXIMETRY MLT: CPT | Mod: ER

## 2025-03-21 PROCEDURE — 93005 ELECTROCARDIOGRAM TRACING: CPT | Mod: ER

## 2025-03-21 PROCEDURE — 80053 COMPREHEN METABOLIC PANEL: CPT | Mod: ER | Performed by: EMERGENCY MEDICINE

## 2025-03-21 PROCEDURE — 99284 EMERGENCY DEPT VISIT MOD MDM: CPT | Mod: 25,ER

## 2025-03-21 RX ORDER — FLUTICASONE PROPIONATE AND SALMETEROL 100; 50 UG/1; UG/1
POWDER RESPIRATORY (INHALATION)
COMMUNITY
Start: 2025-03-19 | End: 2025-03-24

## 2025-03-21 NOTE — ED PROVIDER NOTES
Encounter Date: 3/21/2025       History     Chief Complaint   Patient presents with    Shortness of Breath     Spontaneous pneumo last month. Valve removed on 03/13- SOB on extertion worsening past few days since removed. Hx copd, emphysema.  Not SOB at rest.      Patient is a 59-year-old male who presents to the emergency department with a chief complaint of shortness of breath.  Patient reports shortness of breath over the past several months.  He was diagnosed with pneumothorax and had a chest tube placed.  He had a VATS procedure last month.  He has been following up outpatient with pulmonology and Cardiothoracic surgery.  Cardiothoracic surgery recently pulled his chest tube.  He was told that he is at high-risk for pneumothorax recurrence and was given ER return precautions.  Patient states that he felt okay for a day or so after the chest tube was pulled, but has been having dyspnea on exertion for the past several days.  He reports some mild unchanged right-sided chest discomfort where the chest tube was placed.  No reports of leg pain/swelling, fever, left-sided chest pain.  Patient does smoke      Review of patient's allergies indicates:   Allergen Reactions    Ancef [cefazolin] Other (See Comments)     Headaches and dizziness     Past Medical History:   Diagnosis Date    ADD (attention deficit disorder)     Anxiety     Back pain     Depression     Gout     Neuromuscular disorder     Pneumothorax, unspecified      Past Surgical History:   Procedure Laterality Date    BACK SURGERY  10/13/2016    INJECTION OF ANESTHETIC AGENT AROUND MULTIPLE INTERCOSTAL NERVES Right 2/21/2025    Procedure: BLOCK, NERVE, INTERCOSTAL, 2 OR MORE;  Surgeon: Tim Moss MD;  Location: [facility] OR;  Service: Cardiothoracic;  Laterality: Right;  MULTIPLE INTERCOSTAL NERVE BLOCKS    NECK SURGERY      PLEURODESIS WITH VIDEO-ASSISTED THORACOSCOPIC SURGERY (VATS) Right 2/21/2025    Procedure: VATS, WITH PLEURODESIS;  Surgeon:  Tim Moss MD;  Location: [facility];  Service: Cardiothoracic;  Laterality: Right;  CHEMICAL AND MECHANICAL PLEURODESIS     Family History   Problem Relation Name Age of Onset    Arthritis Father      Pulmonary fibrosis Mother       Social History[1]  Review of Systems  See HPI. Otherwise Review of Systems negative.      Physical Exam     Initial Vitals   BP Pulse Resp Temp SpO2   03/21/25 0910 03/21/25 0910 03/21/25 0910 03/21/25 0911 03/21/25 0910   (!) 145/80 (!) 114 (!) 30 97.5 °F (36.4 °C) 98 %      MAP       --                Physical Exam    Nursing note and vitals reviewed.  Constitutional: No distress.   Thin   HENT:   Head: Normocephalic and atraumatic. Mouth/Throat: Oropharynx is clear and moist.   Eyes: Conjunctivae and EOM are normal. Pupils are equal, round, and reactive to light.   Neck: Neck supple. No tracheal deviation present.   Cardiovascular:  Normal rate, regular rhythm, normal heart sounds and intact distal pulses.           Pulmonary/Chest: Breath sounds normal. No respiratory distress.   Chest tube insertion site appears to be healing appropriately.  No signs of infection.  Wound closed   Abdominal: Abdomen is soft. He exhibits no distension. There is no abdominal tenderness. There is no rebound and no guarding.   Musculoskeletal:         General: No tenderness or edema. Normal range of motion.      Cervical back: Neck supple.     Neurological: He is alert and oriented to person, place, and time. GCS score is 15. GCS eye subscore is 4. GCS verbal subscore is 5. GCS motor subscore is 6.   No focal deficits   Skin: Skin is warm. No rash noted. No erythema.   Psychiatric: He has a normal mood and affect. His behavior is normal.         ED Course   Procedures  Labs Reviewed   CBC W/ AUTO DIFFERENTIAL - Abnormal       Result Value    WBC 9.36      RBC 3.96 (*)     Hemoglobin 11.9 (*)     Hematocrit 35.5 (*)     MCV 90      MCH 30.1      MCHC 33.5      RDW 13.2      Platelets 601 (*)     MPV  8.2 (*)     Immature Granulocytes 0.5      Gran # (ANC) 6.0      Immature Grans (Abs) 0.05 (*)     Lymph # 1.2      Mono # 1.1 (*)     Eos # 1.0 (*)     Baso # 0.07      nRBC 0      Gran % 63.9      Lymph % 12.4 (*)     Mono % 11.5      Eosinophil % 11.0 (*)     Basophil % 0.7      Differential Method Automated     COMPREHENSIVE METABOLIC PANEL - Abnormal    Sodium 137      Potassium 3.9      Chloride 101      CO2 24      Glucose 88      BUN 10      Creatinine 0.7      Calcium 9.3      Total Protein 9.1 (*)     Albumin 2.8 (*)     Total Bilirubin 0.2      Alkaline Phosphatase 87      AST 16      ALT 15      eGFR >60.0      Anion Gap 12     B-TYPE NATRIURETIC PEPTIDE    BNP <10     TROPONIN I    Troponin I <0.006       EKG Readings: (Independently Interpreted)   EKG reviewed and interpreted by ED provider as sinus tachycardia with a rate of 109, normal axis, normal ST-T segments       Imaging Results              X-Ray Chest AP Portable (Final result)  Result time 03/21/25 09:35:10      Final result by Ellen NoblesMichaelMD julissa (03/21/25 09:35:10)                   Impression:      Extensive interstitial lung changes involving lungs suggesting underlying fibrosis.  Stable small right apical pneumothorax      Electronically signed by: Ellen Nobles MD  Date:    03/21/2025  Time:    09:35               Narrative:    EXAMINATION:  XR CHEST AP PORTABLE    CLINICAL HISTORY:  , Dyspnea;    COMPARISON:  Chest, 03/13/2025    FINDINGS:  One view.  Heart is stable in size.  Suspected stable small right apical pneumothorax.  Extensive interstitial bilateral changes involving the lungs.  These findings appears stable.  Interval removal pleural catheter.                                       Medications - No data to display  Medical Decision Making  59-year-old male with dyspnea on exertion.  Recent pneumothorax. Imaging today shows small residual apical pneumothorax, which was present the day Cardiothoracic surgery remove  the chest tube.  No worsening.  Patient does have underlying pulmonary fibrosis with no acute changes.  Afebrile.  Normal white blood cell count.  Patient's ambulatory pulse ox was 90-93%.  Did not qualify for home oxygen.  No wheezing on exam.  Patient states pulmonology did just add on a maintenance inhaler to his regimen.  He was counseled on smoking cessation.  Was tachycardic, but review of charts shows that he has been tachycardic at his recent outpatient visits.  No change.  Offered patient a breathing treatment here in the emergency department, but patient politely declined stating that he would prefer to be discharged quickly as opposed to waiting around for breathing treatment.  He understands the importance of following up with his pulmonologist.  ER return precautions provided    Amount and/or Complexity of Data Reviewed  External Data Reviewed: radiology and notes.     Details: Past medical history, medications, allergies reviewed.  Prior outpatient pulmonology and Cardiothoracic notes reviewed.  Prior inpatient notes reviewed  Labs: ordered. Decision-making details documented in ED Course.  Radiology: ordered and independent interpretation performed. Decision-making details documented in ED Course.  ECG/medicine tests: ordered and independent interpretation performed. Decision-making details documented in ED Course.    Risk  OTC drugs.  Prescription drug management.  Decision regarding hospitalization.                     Vitals:    03/21/25 1045 03/21/25 1100 03/21/25 1115 03/21/25 1304   BP:   116/70 110/61   Pulse:   (!) 114 98   Resp: 20 20 20 20   Temp:       TempSrc:       SpO2:   98% 97%                      Clinical Impression:  Final diagnoses:  [J84.10] Pulmonary fibrosis (Primary)  [R06.00] Dyspnea, unspecified type          ED Disposition Condition    Discharge Stable          ED Prescriptions    None       Follow-up Information       Follow up With Specialties Details Why Contact Info     Kun Abdul MD Pulmonary Disease   [id number] [place] [id number]  [phone]      [facility] - Emergency Dept Emergency Medicine  As needed, If symptoms worsen [id number] [facility] [address]  Emergency Department  [facility] [id number] [phone]                 [1]   Social History  Tobacco Use    Smoking status: Some Days     Current packs/day: 1.00     Types: Cigarettes    Smokeless tobacco: Never   Substance Use Topics    Alcohol use: Not Currently     Comment: occassionally     Drug use: No        Marino Fried MD  03/21/25 1683

## 2025-03-21 NOTE — ED NOTES
SPO2 on Room air at rest 97%. SPO2 during ambulation remained between 90%-93% on room air. Patient complained of SOB during ambulation. SPO2 increased to 97% post ambulation.

## 2025-03-24 ENCOUNTER — CLINICAL SUPPORT (OUTPATIENT)
Dept: PULMONOLOGY | Facility: CLINIC | Age: 60
End: 2025-03-24
Payer: MEDICARE

## 2025-03-24 ENCOUNTER — OFFICE VISIT (OUTPATIENT)
Dept: PULMONOLOGY | Facility: CLINIC | Age: 60
End: 2025-03-24
Payer: MEDICARE

## 2025-03-24 VITALS
HEIGHT: 68 IN | BODY MASS INDEX: 17.88 KG/M2 | WEIGHT: 117.94 LBS | DIASTOLIC BLOOD PRESSURE: 66 MMHG | OXYGEN SATURATION: 97 % | HEART RATE: 115 BPM | SYSTOLIC BLOOD PRESSURE: 124 MMHG | RESPIRATION RATE: 20 BRPM

## 2025-03-24 VITALS — WEIGHT: 117.94 LBS | HEIGHT: 68 IN | BODY MASS INDEX: 17.88 KG/M2

## 2025-03-24 DIAGNOSIS — J44.9 CHRONIC OBSTRUCTIVE PULMONARY DISEASE, UNSPECIFIED COPD TYPE: Primary | ICD-10-CM

## 2025-03-24 DIAGNOSIS — J84.10 PULMONARY FIBROSIS: ICD-10-CM

## 2025-03-24 DIAGNOSIS — J44.9 CHRONIC OBSTRUCTIVE PULMONARY DISEASE, UNSPECIFIED COPD TYPE: ICD-10-CM

## 2025-03-24 DIAGNOSIS — M54.9 CHRONIC BACK PAIN, UNSPECIFIED BACK LOCATION, UNSPECIFIED BACK PAIN LATERALITY: ICD-10-CM

## 2025-03-24 DIAGNOSIS — G89.29 CHRONIC BACK PAIN, UNSPECIFIED BACK LOCATION, UNSPECIFIED BACK PAIN LATERALITY: ICD-10-CM

## 2025-03-24 PROCEDURE — 99999 PR PBB SHADOW E&M-EST. PATIENT-LVL I: CPT | Mod: PBBFAC,,,

## 2025-03-24 PROCEDURE — 99999 PR PBB SHADOW E&M-EST. PATIENT-LVL V: CPT | Mod: PBBFAC,,, | Performed by: INTERNAL MEDICINE

## 2025-03-24 RX ORDER — IPRATROPIUM BROMIDE AND ALBUTEROL SULFATE 2.5; .5 MG/3ML; MG/3ML
3 SOLUTION RESPIRATORY (INHALATION) EVERY 6 HOURS PRN
Qty: 360 ML | Refills: 3 | Status: SHIPPED | OUTPATIENT
Start: 2025-03-24 | End: 2026-03-24

## 2025-03-24 RX ORDER — FLUTICASONE PROPIONATE AND SALMETEROL 500; 50 UG/1; UG/1
1 POWDER RESPIRATORY (INHALATION) 2 TIMES DAILY
Qty: 60 EACH | Refills: 5 | Status: SHIPPED | OUTPATIENT
Start: 2025-03-24 | End: 2025-09-20

## 2025-03-24 NOTE — PROGRESS NOTES
"                                         Pulmonary Outpatient Follow Up Visit     Subjective:       Patient ID: Octavio Pang is a 59 y.o. male.    Chief Complaint: Shortness of Breath      HPI        Patient is a 59 y.o. male presenting for follow-up.      03/24/2025 status post chest tube removal.  On albuterol and on Advair 100 mcg 1 puff daily.      Chantix to quit smoking.      He is known   with significant history of smoking, welding, emphysema COPD and pulmonary fibrosis complicated by bilateral pneumothoraces February 2025 status post VATS mechanical and chemical pleurodesis and a chest tube on DC then removal after last visit by CT surgery.        Has albuterol p.r.n..    Smoking 1 PPD quit recently   Worked in plants     Review of Systems   Respiratory:  Positive for cough, shortness of breath, dyspnea on extertion and use of rescue inhaler.        Encounter Medications[1]    Objective:     Vital Signs (Most Recent)  Vital Signs  Pulse: (!) 115  Resp: 20  SpO2: 97 %  BP: 124/66  Pain Score:   8  Pain Loc: Chest  Height and Weight  Height: 5' 8" (172.7 cm)  Weight: 53.5 kg (117 lb 15.1 oz)  BSA (Calculated - sq m): 1.6 sq meters  BMI (Calculated): 17.9  Weight in (lb) to have BMI = 25: 164.1]  Wt Readings from Last 2 Encounters:   03/24/25 53.5 kg (117 lb 15.1 oz)   03/13/25 54.2 kg (119 lb 6.1 oz)       Physical Exam   Constitutional: He appears well-developed. He appears cachectic.   Pulmonary/Chest: Normal expansion and effort normal. No respiratory distress. He has decreased breath sounds. He has no wheezes. He has rhonchi.   Right-sided chest tube with brown fluid collection noted       Laboratory  Lab Results   Component Value Date    WBC 9.36 03/21/2025    RBC 3.96 (L) 03/21/2025    HGB 11.9 (L) 03/21/2025    HCT 35.5 (L) 03/21/2025    MCV 90 03/21/2025    MCH 30.1 03/21/2025    MCHC 33.5 03/21/2025    RDW 13.2 03/21/2025     (H) 03/21/2025    MPV 8.2 (L) 03/21/2025    GRAN 6.0 03/21/2025    " "GRAN 63.9 03/21/2025    LYMPH 1.2 03/21/2025    LYMPH 12.4 (L) 03/21/2025    MONO 1.1 (H) 03/21/2025    MONO 11.5 03/21/2025    EOS 1.0 (H) 03/21/2025    BASO 0.07 03/21/2025    EOSINOPHIL 11.0 (H) 03/21/2025    BASOPHIL 0.7 03/21/2025       BMP  Lab Results   Component Value Date     03/21/2025    K 3.9 03/21/2025     03/21/2025    CO2 24 03/21/2025    BUN 10 03/21/2025    CREATININE 0.7 03/21/2025    CALCIUM 9.3 03/21/2025    ANIONGAP 12 03/21/2025    ESTGFRAFRICA >60.0 03/01/2018    EGFRNONAA >60.0 03/01/2018    AST 16 03/21/2025    ALT 15 03/21/2025    PROT 9.1 (H) 03/21/2025       Lab Results   Component Value Date    BNP <10 03/21/2025    BNP <10 02/18/2025       Lab Results   Component Value Date    TSH 1.94 08/19/2024       No results found for: "SEDRATE"    No results found for: "CRP"  No results found for: "IGE"     No results found for: "ASPERGILLUS"  No results found for: "AFUMIGATUSCL"     No results found for: "ACE"     Diagnostic Results:  I have personally reviewed today the following studies:    Cxr 3/21/2025       COMPARISON:  Chest, 03/13/2025     FINDINGS:  One view.  Heart is stable in size.  Suspected stable small right apical pneumothorax.  Extensive interstitial bilateral changes involving the lungs.  These findings appears stable.  Interval removal pleural catheter.     Impression:     Extensive interstitial lung changes involving lungs suggesting underlying fibrosis.  Stable small right apical pneumothorax            CT chest February 2025  FINDINGS:  Tiny left apical pneumothorax.  Mild moderate right-sided pneumothorax.  Mild moderate subpleural reticular and subpleural opacity in the right greater than left lung.  Anterior right pleural drainage catheter is identified.  Heart is not enlarged.  No aneurysm.  No pulmonary embolism.  Mild right sided pleural effusion.  Slight left-sided pleural effusion.  Coronary artery calcification.  Mediastinal lymph nodes behind the left " atrium suspected.  Correlate clinically for pneumonia.    Assessment/Plan:   Chronic obstructive pulmonary disease, unspecified COPD type  -     PULSE OXIMETRY OVERNIGHT; Future  -     Ambulatory referral/consult to Pulmonary Disease Management w/ Respiratory Therapist; Future; Expected date: 03/31/2025    Pulmonary fibrosis  -     Stress test, pulmonary; Future  -     Complete PFT with bronchodilator; Future  -     NEBULIZER FOR HOME USE  -     NEBULIZER KIT (SUPPLIES) FOR HOME USE  -     albuterol-ipratropium (DUO-NEB) 2.5 mg-0.5 mg/3 mL nebulizer solution; Take 3 mLs by nebulization every 6 (six) hours as needed for Wheezing or Shortness of Breath. Rescue  Dispense: 360 mL; Refill: 3  -     PULSE OXIMETRY OVERNIGHT; Future    Chronic back pain, unspecified back location, unspecified back pain laterality  -     Ambulatory referral/consult to Pain Clinic; Future; Expected date: 03/31/2025    Other orders  -     fluticasone-salmeterol diskus inhaler 500-50 mcg; Inhale 1 puff into the lungs 2 (two) times daily. Controller  Dispense: 60 each; Refill: 5        Albuterol/DuoNeb p.r.n.     Advair 500 mcg 1 puff twice daily.      Check 6 minute walk PFT overnight oximetry.      Continue smoking cessation continue Chantix.    Follow up in about 3 months (around 6/24/2025).    This note was prepared using voice recognition system and is likely to have sound alike errors that may have been overlooked even after proof reading.  Please call me with any questions    Discussed diagnosis, its evaluation, treatment and usual course. All questions answered.      Kun Abdul MD         [1]   Outpatient Encounter Medications as of 3/24/2025   Medication Sig Dispense Refill    albuterol (PROVENTIL/VENTOLIN HFA) 90 mcg/actuation inhaler Inhale 2 puffs into the lungs every 6 (six) hours as needed.      albuterol-ipratropium (DUO-NEB) 2.5 mg-0.5 mg/3 mL nebulizer solution Take 3 mLs by nebulization every 6 (six) hours as needed for  Wheezing or Shortness of Breath. Rescue 360 mL 3    allopurinol (ZYLOPRIM) 100 MG tablet Take 100 mg by mouth 3 (three) times daily.      alprazolam (XANAX) 1 MG tablet Take 2 mg by mouth nightly as needed for Anxiety.       clonazePAM (KLONOPIN) 1 MG tablet Take 1 mg by mouth 2 (two) times daily as needed for Anxiety.      cyanocobalamin (VITAMIN B-12) 1000 MCG tablet Take 1 tablet by mouth every morning.      fluticasone-salmeterol diskus inhaler 500-50 mcg Inhale 1 puff into the lungs 2 (two) times daily. Controller 60 each 5    gabapentin (NEURONTIN) 100 MG capsule Take 100 mg by mouth as needed.      lisdexamfetamine (VYVANSE) 30 MG capsule Take 30 mg by mouth every morning.      methocarbamol (ROBAXIN) 500 MG Tab Take 500 mg by mouth.      methylphenidate HCl (METADATE ER) 10 MG ER tablet Take 10 mg by mouth.      methylPREDNISolone (MEDROL DOSEPACK) 4 mg tablet As per package label 1 each 0    naproxen (NAPROSYN) 500 MG tablet Take 1 tablet (500 mg total) by mouth 2 (two) times daily with meals. 60 tablet 0    oxyCODONE (ROXICODONE) 5 MG immediate release tablet Take 1 tablet (5 mg total) by mouth every 4 (four) hours as needed for Pain. 10 tablet 0    oxyCODONE-acetaminophen (PERCOCET)  mg per tablet Take 1 tablet by mouth every 8 (eight) hours as needed for Pain. 20 tablet 0    varenicline tartrate (CHANTIX EILEEN) 0.5 mg (11)- 1 mg (42) tablet use as directed      varenicline tartrate (CHANTIX) 1 mg Tab Take 1 mg by mouth.      [DISCONTINUED] fluticasone-salmeterol diskus inhaler 100-50 mcg SMARTSI Puff(s) Via Inhaler       No facility-administered encounter medications on file as of 3/24/2025.

## 2025-03-24 NOTE — PROCEDURES
"[facility] - Pulmonary Function  Six Minute Walk     SUMMARY     Ordering Provider: Dr. Abdul   Interpreting Provider: Dr. Abdul  Performing nurse/tech/RT: GODWIN Meyer, RRT  Diagnosis: Pulmonary Fibrosis  Height: 5' 8" (172.7 cm)  Weight: 53.5 kg (117 lb 15.1 oz)  BMI (Calculated): 17.9                Phase Oxygen Assessment Supplemental O2 Heart   Rate Blood Pressure Shea Dyspnea Scale Rating   Resting 93 % Room Air 119 bpm 119/79 7-8   Exercise        Minute        1 92 % Room Air 123 bpm     2 91 % Room Air 125 bpm     3 88 % Room Air 127 bpm     4 92 % 2 L/M 122 bpm     5 94 % 2 L/M 116 bpm     6  98 % 2 L/M 120 bpm 109/70 9   Recovery        Minute        1 97 % 2 L/M 114 bpm     2 97 % 2 L/M 111 bpm     3 98 % 2 L/M 112 bpm     4 98 % 2 L/M 112 bpm 100/68 5-6     Six Minute Walk Summary  6MWT Status: completed with stops  Patient Reported: Dyspnea, Lightheadedness, Chest pain/tightness     Interpretation:  Did the patient stop or pause?: Yes  How many times did the patient stop or pause?: 1  Stop Time 1: 195  Restart Time 1: 334  Pause Time 1: 139 seconds                             Total Time Walked (Calculated): 221 seconds  Final Partial Lap Distance (feet): 25 feet  Total Distance Meters (Calculated): 129.54 meters  Predicted Distance Meters (Calculated): 608 meters  Percentage of Predicted (Calculated): 21.31  Peak VO2 (Calculated): 7.87  Mets: 2.25  Has The Patient Had a Previous Six Minute Walk Test?: No       Previous 6MWT Results  Has The Patient Had a Previous Six Minute Walk Test?: No    "

## 2025-03-25 ENCOUNTER — TELEPHONE (OUTPATIENT)
Dept: PULMONOLOGY | Facility: CLINIC | Age: 60
End: 2025-03-25
Payer: MEDICARE

## 2025-03-25 ENCOUNTER — TELEPHONE (OUTPATIENT)
Dept: PAIN MEDICINE | Facility: CLINIC | Age: 60
End: 2025-03-25
Payer: MEDICARE

## 2025-03-25 NOTE — TELEPHONE ENCOUNTER
Returned call. Answered questions that the patient had about overnight pulse ox study. Patient verbalized understanding. Plans to return pulse ox Wednesday, 3/26.

## 2025-03-27 ENCOUNTER — TELEPHONE (OUTPATIENT)
Dept: PULMONOLOGY | Facility: CLINIC | Age: 60
End: 2025-03-27
Payer: MEDICARE

## 2025-04-03 ENCOUNTER — CLINICAL SUPPORT (OUTPATIENT)
Dept: PULMONOLOGY | Facility: CLINIC | Age: 60
End: 2025-04-03
Payer: MEDICARE

## 2025-04-03 DIAGNOSIS — J84.10 PULMONARY FIBROSIS: ICD-10-CM

## 2025-04-03 LAB
BRPFT: ABNORMAL
DLCO ADJ PRE: 7.96 ML/(MIN*MMHG) (ref 20.83–34.69)
DLCO SINGLE BREATH LLN: 20.83
DLCO SINGLE BREATH PRE REF: 26.2 %
DLCO SINGLE BREATH REF: 27.76
DLCOC SBVA LLN: 2.87
DLCOC SBVA PRE REF: 71.8 %
DLCOC SBVA REF: 4.12
DLCOC SINGLE BREATH LLN: 20.83
DLCOC SINGLE BREATH PRE REF: 28.7 %
DLCOC SINGLE BREATH REF: 27.76
DLCOVA LLN: 2.87
DLCOVA PRE REF: 65.6 %
DLCOVA PRE: 2.7 ML/(MIN*MMHG*L) (ref 2.87–5.36)
DLCOVA REF: 4.12
DLVAADJ PRE: 2.95 ML/(MIN*MMHG*L) (ref 2.87–5.36)
ERV LLN: -16448.85
ERV PRE REF: 48.9 %
ERV REF: 1.15
FEF 25 75 LLN: 1.81
FEF 25 75 PRE REF: 121.2 %
FEF 25 75 REF: 3.52
FEV1 FVC LLN: 66
FEV1 FVC PRE REF: 117.8 %
FEV1 FVC REF: 78
FEV1 LLN: 2.57
FEV1 PRE REF: 45.3 %
FEV1 REF: 3.4
FRCPLETH LLN: 2.5
FRCPLETH PREREF: 105.6 %
FRCPLETH REF: 3.49
FVC LLN: 3.34
FVC PRE REF: 38.4 %
FVC REF: 4.37
IVC PRE: 0.85 L (ref 3.34–5.4)
IVC SINGLE BREATH LLN: 3.34
IVC SINGLE BREATH PRE REF: 19.5 %
IVC SINGLE BREATH REF: 4.37
MVV LLN: 110
MVV PRE REF: 47.9 %
MVV REF: 130
PEF LLN: 6.68
PEF PRE REF: 69.1 %
PEF REF: 8.87
PRE DLCO: 7.28 ML/(MIN*MMHG) (ref 20.83–34.69)
PRE ERV: 0.56 L (ref -16448.85–16451.15)
PRE FEF 25 75: 4.26 L/S (ref 1.81–5.23)
PRE FET 100: 2.9 SEC
PRE FEV1 FVC: 91.83 % (ref 65.98–89.9)
PRE FEV1: 1.54 L (ref 2.57–4.23)
PRE FRC PL: 3.68 L
PRE FVC: 1.68 L (ref 3.34–5.4)
PRE MVV: 62 L/MIN (ref 110.09–148.95)
PRE PEF: 6.13 L/S (ref 6.68–11.07)
PRE RV: 2.81 L (ref 1.66–3.01)
PRE TLC: 4.48 L (ref 5.59–7.89)
RAW LLN: 3.06
RAW PRE REF: 91.9 %
RAW PRE: 2.81 CMH2O*S/L (ref 3.06–3.06)
RAW REF: 3.06
RV LLN: 1.66
RV PRE REF: 120.3 %
RV REF: 2.33
RVTLC LLN: 28
RVTLC PRE REF: 169.3 %
RVTLC PRE: 62.6 % (ref 27.99–45.95)
RVTLC REF: 37
TLC LLN: 5.59
TLC PRE REF: 66.5 %
TLC REF: 6.74
VA PRE: 2.69 L (ref 6.59–6.59)
VA SINGLE BREATH LLN: 6.59
VA SINGLE BREATH PRE REF: 40.8 %
VA SINGLE BREATH REF: 6.59
VC LLN: 3.34
VC PRE REF: 38.4 %
VC PRE: 1.68 L (ref 3.34–5.4)
VC REF: 4.37
VTGRAWPRE: 3.78 L

## 2025-04-11 ENCOUNTER — PATIENT MESSAGE (OUTPATIENT)
Dept: PULMONOLOGY | Facility: CLINIC | Age: 60
End: 2025-04-11
Payer: MEDICARE

## 2025-04-25 ENCOUNTER — PATIENT MESSAGE (OUTPATIENT)
Dept: PULMONOLOGY | Facility: CLINIC | Age: 60
End: 2025-04-25

## 2025-04-25 ENCOUNTER — OFFICE VISIT (OUTPATIENT)
Dept: PULMONOLOGY | Facility: CLINIC | Age: 60
End: 2025-04-25
Payer: MEDICARE

## 2025-04-25 DIAGNOSIS — Z99.81 ON HOME O2: ICD-10-CM

## 2025-04-25 DIAGNOSIS — Z87.891 FORMER HEAVY TOBACCO SMOKER: ICD-10-CM

## 2025-04-25 DIAGNOSIS — J44.9 CHRONIC OBSTRUCTIVE PULMONARY DISEASE, UNSPECIFIED COPD TYPE: ICD-10-CM

## 2025-04-25 DIAGNOSIS — J84.10 PULMONARY FIBROSIS: Primary | ICD-10-CM

## 2025-04-25 NOTE — PROGRESS NOTES
Pulmonary Outpatient Follow Up Visit     Subjective:    The patient location is: Home  The chief complaint leading to consultation is: SOB   Visit type: Virtual visit with synchronous audio and video  Total time spent with patient: 20 min   Each patient to whom he or she provides medical services by telemedicine is:  (1) informed of the relationship between the physician and patient and the respective role of any other health care provider with respect to management of the patient; and (2) notified that he or she may decline to receive medical services by telemedicine and may withdraw from such care at any time.  Total time spent in face to face counseling and coordination of care -  15minutes over 50% of time was used in discussion of prognosis, risks, benefits of treatment, instructions and compliance with regimen .      Patient ID: Octavio Pang is a 59 y.o. male.    Chief Complaint: COPD        History of Present Illness    CHIEF COMPLAINT:  Patient presents today for severe shortness of breath with exertion    HISTORY OF PRESENT ILLNESS:  He experiences severe shortness of breath which worsened after recent surgery, beginning the day after chest tube removal. He can only walk about 10 feet before becoming severely dyspneic. Associated symptoms include chest pain and rapid heartbeat. He describes the sensation similar to a panic attack but denies feeling anxious or scared. These symptoms significantly impact his daily activities. The shortness of breath persists despite supplemental oxygen use. Home oxygen levels are generally in the 90s, with one reported drop to 50. He denies using oxygen at night after being informed it was not needed. He reports a productive cough with thick mucus.    ACTIVITY LEVEL:  He reports a significant decline from his baseline activity level. 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 Currently, he is predominantly bed-bound, unable to reach his nearby icebox and relying on a urinal for toileting needs.    MEDICATIONS:  He uses a nebulizer with 500 mcg medication twice daily, which helps with respiratory symptoms.    SOCIAL HISTORY:  He quit smoking since onset of respiratory symptoms, noting severe breathing difficulties and coughing when attempted.    FAMILY HISTORY:  His mother  from pulmonary fibrosis.                   Review of Systems   Respiratory:  Positive for cough, shortness of breath, dyspnea on extertion and use of rescue inhaler.        Encounter Medications[1]    Objective:     Vital Signs (Most Recent)   ]  Wt Readings from Last 2 Encounters:   25 53.5 kg (117 lb 15.1 oz)   25 53.5 kg (117 lb 15.1 oz)       Physical Exam    Laboratory  Lab Results   Component Value Date    WBC 9.36 2025    RBC 3.96 (L) 2025    HGB 11.9 (L) 2025    HCT 35.5 (L) 2025    MCV 90 2025    MCH 30.1 2025    MCHC 33.5 2025    RDW 13.2 2025     (H) 2025    MPV 8.2 (L) 2025    GRAN 6.0 2025    GRAN 63.9 2025    LYMPH 1.2 2025    LYMPH 12.4 (L) 2025    MONO 1.1 (H) 2025    MONO 11.5 2025    EOS 1.0 (H) 2025    BASO 0.07 2025    EOSINOPHIL 11.0 (H) 2025    BASOPHIL 0.7 2025       BMP  Lab Results   Component Value Date     2025    K 3.9 2025     2025    CO2 24 2025    BUN 10 2025    CREATININE 0.7 2025    CALCIUM 9.3 2025    ANIONGAP 12 2025    ESTGFRAFRICA >60.0 2018    EGFRNONAA >60.0 2018    AST 16 2025    ALT 15 2025    PROT 9.1 (H) 2025       Lab Results   Component Value Date    BNP <10 2025    BNP <10 2025       Lab Results   Component Value Date    TSH 1.94 2024       No results found for: "SEDRATE"    No results " "found for: "CRP"  No results found for: "IGE"     No results found for: "ASPERGILLUS"  No results found for: "AFUMIGATUSCL"     No results found for: "ACE"     Diagnostic Results:  I have personally reviewed today the following studies:  As above    Assessment/Plan:   Pulmonary fibrosis  -     Ambulatory referral/consult to Pulmonary Rehab; Future; Expected date: 05/02/2025    Chronic obstructive pulmonary disease, unspecified COPD type  -     Ambulatory referral/consult to Pulmonary Rehab; Future; Expected date: 05/02/2025    On home O2  -     Ambulatory referral/consult to Pulmonary Rehab; Future; Expected date: 05/02/2025    Former heavy tobacco smoker  -     Ambulatory referral/consult to Pulmonary Rehab; Future; Expected date: 05/02/2025      Assessment & Plan    J84.10 Pulmonary fibrosis    IMPRESSION:  - Assessed significant shortness of breath and chest pain on exertion.  - Reviewed oxygen use and recommended consistent use during ambulation based on previous sleep walking test results.  - Considered pulmonary rehab as potential intervention to improve respiratory function and quality of life.  - Decided against immediate CT Chest, opting to reassess at next clinic visit.    PULMONARY FIBROSIS:  - Explained benefits of pulmonary rehab in improving lung fitness, symptoms, and quality of life for patients with significant advanced COPD.  - Use supplemental oxygen during all physical activities, including walking.  - Continue using oxygen monitoring device at home to track oxygen levels.  - Patient to seek emergency care for significant shortness of breath.  - Continue nebulizer treatment with Advair 500 twice daily.  - Referred to pulmonary rehabilitation program, twice weekly for 3 months, pending insurance approval.  - Discussed process of obtaining authorization for pulmonary rehab program from insurance.         Follow up in about 3 months (around 7/25/2025).    This note was prepared using voice " recognition system and is likely to have sound alike errors that may have been overlooked even after proof reading.  Please call me with any questions    Discussed diagnosis, its evaluation, treatment and usual course. All questions answered.      Kun Abdul MD         [1]   Outpatient Encounter Medications as of 4/25/2025   Medication Sig Dispense Refill    albuterol (PROVENTIL/VENTOLIN HFA) 90 mcg/actuation inhaler Inhale 2 puffs into the lungs every 6 (six) hours as needed.      albuterol-ipratropium (DUO-NEB) 2.5 mg-0.5 mg/3 mL nebulizer solution Take 3 mLs by nebulization every 6 (six) hours as needed for Wheezing or Shortness of Breath. Rescue 360 mL 3    allopurinol (ZYLOPRIM) 100 MG tablet Take 100 mg by mouth 3 (three) times daily.      alprazolam (XANAX) 1 MG tablet Take 2 mg by mouth nightly as needed for Anxiety.       clonazePAM (KLONOPIN) 1 MG tablet Take 1 mg by mouth 2 (two) times daily as needed for Anxiety.      cyanocobalamin (VITAMIN B-12) 1000 MCG tablet Take 1 tablet by mouth every morning.      fluticasone-salmeterol diskus inhaler 500-50 mcg Inhale 1 puff into the lungs 2 (two) times daily. Controller 60 each 5    gabapentin (NEURONTIN) 100 MG capsule Take 100 mg by mouth as needed.      lisdexamfetamine (VYVANSE) 30 MG capsule Take 30 mg by mouth every morning.      methocarbamol (ROBAXIN) 500 MG Tab Take 500 mg by mouth.      methylphenidate HCl (METADATE ER) 10 MG ER tablet Take 10 mg by mouth.      methylPREDNISolone (MEDROL DOSEPACK) 4 mg tablet As per package label 1 each 0    naproxen (NAPROSYN) 500 MG tablet Take 1 tablet (500 mg total) by mouth 2 (two) times daily with meals. 60 tablet 0    oxyCODONE (ROXICODONE) 5 MG immediate release tablet Take 1 tablet (5 mg total) by mouth every 4 (four) hours as needed for Pain. 10 tablet 0    oxyCODONE-acetaminophen (PERCOCET)  mg per tablet Take 1 tablet by mouth every 8 (eight) hours as needed for Pain. 20 tablet 0     varenicline tartrate (CHANTIX EILEEN) 0.5 mg (11)- 1 mg (42) tablet use as directed      varenicline tartrate (CHANTIX) 1 mg Tab Take 1 mg by mouth.       No facility-administered encounter medications on file as of 4/25/2025.

## 2025-05-01 ENCOUNTER — TELEPHONE (OUTPATIENT)
Dept: PULMONOLOGY | Facility: CLINIC | Age: 60
End: 2025-05-01
Payer: MEDICARE

## 2025-05-01 NOTE — TELEPHONE ENCOUNTER
Chronic Disease Management:  Called patient to schedule initial Pulmonary Disease Management appointment.      Time spent on call    mins

## 2025-05-01 NOTE — TELEPHONE ENCOUNTER
Chronic Disease Management:  Patient returned call to PDM with concerns regarding severe dyspnea with exertion. Patient reports symptoms have been present since VATS procedure, spontaneous pneumothorax. Patient is unable to walk 10ft and experiences chest pain and chest tightness with activity.    Message sent to Dr. Abdul.  Recommend patient report to ED for evaluation. Patient is agreeable. Understanding was stated.

## 2025-05-04 ENCOUNTER — HOSPITAL ENCOUNTER (EMERGENCY)
Facility: HOSPITAL | Age: 60
Discharge: HOME | End: 2025-05-04
Attending: EMERGENCY MEDICINE
Payer: MEDICARE

## 2025-05-04 VITALS
HEART RATE: 108 BPM | RESPIRATION RATE: 22 BRPM | TEMPERATURE: 98 F | HEIGHT: 68 IN | DIASTOLIC BLOOD PRESSURE: 80 MMHG | OXYGEN SATURATION: 97 % | SYSTOLIC BLOOD PRESSURE: 128 MMHG | WEIGHT: 116.88 LBS | BODY MASS INDEX: 17.72 KG/M2

## 2025-05-04 DIAGNOSIS — R07.9 CHEST PAIN, UNSPECIFIED TYPE: ICD-10-CM

## 2025-05-04 DIAGNOSIS — J84.10 PULMONARY FIBROSIS: Primary | ICD-10-CM

## 2025-05-04 DIAGNOSIS — R06.02 SOB (SHORTNESS OF BREATH): ICD-10-CM

## 2025-05-04 LAB
ABSOLUTE EOSINOPHIL (OHS): 1.15 K/UL
ABSOLUTE MONOCYTE (OHS): 1.21 K/UL (ref 0.3–1)
ABSOLUTE NEUTROPHIL COUNT (OHS): 5.98 K/UL (ref 1.8–7.7)
ALBUMIN SERPL BCP-MCNC: 3.3 G/DL (ref 3.5–5.2)
ALLENS TEST: NORMAL
ALP SERPL-CCNC: 59 UNIT/L (ref 40–150)
ALT SERPL W/O P-5'-P-CCNC: 13 UNIT/L (ref 10–44)
ANION GAP (OHS): 12 MMOL/L (ref 8–16)
AST SERPL-CCNC: 19 UNIT/L (ref 11–45)
BACTERIA #/AREA URNS HPF: NORMAL /HPF
BASOPHILS # BLD AUTO: 0.07 K/UL
BASOPHILS NFR BLD AUTO: 0.7 %
BILIRUB SERPL-MCNC: 0.4 MG/DL (ref 0.1–1)
BILIRUB UR QL STRIP.AUTO: NEGATIVE
BNP SERPL-MCNC: <10 PG/ML (ref 0–99)
BUN SERPL-MCNC: 11 MG/DL (ref 6–20)
CALCIUM SERPL-MCNC: 9.2 MG/DL (ref 8.7–10.5)
CHLORIDE SERPL-SCNC: 100 MMOL/L (ref 95–110)
CLARITY UR: CLEAR
CO2 SERPL-SCNC: 25 MMOL/L (ref 23–29)
COLOR UR AUTO: ABNORMAL
CREAT SERPL-MCNC: 0.7 MG/DL (ref 0.5–1.4)
CTP QC/QA: YES
CTP QC/QA: YES
DELSYS: NORMAL
ERYTHROCYTE [DISTWIDTH] IN BLOOD BY AUTOMATED COUNT: 14.9 % (ref 11.5–14.5)
FIO2: 21
GFR SERPLBLD CREATININE-BSD FMLA CKD-EPI: >60 ML/MIN/1.73/M2
GLUCOSE SERPL-MCNC: 90 MG/DL (ref 70–110)
GLUCOSE UR QL STRIP: NEGATIVE
HCO3 UR-SCNC: 24.7 MMOL/L (ref 24–28)
HCT VFR BLD AUTO: 38.5 % (ref 40–54)
HGB BLD-MCNC: 12.9 GM/DL (ref 14–18)
HGB UR QL STRIP: NEGATIVE
HOLD SPECIMEN: NORMAL
IMM GRANULOCYTES # BLD AUTO: 0.04 K/UL (ref 0–0.04)
IMM GRANULOCYTES NFR BLD AUTO: 0.4 % (ref 0–0.5)
KETONES UR QL STRIP: NEGATIVE
LACTATE SERPL-SCNC: 1.2 MMOL/L (ref 0.5–2.2)
LEUKOCYTE ESTERASE UR QL STRIP: ABNORMAL
LIPASE SERPL-CCNC: 16 U/L (ref 4–60)
LYMPHOCYTES # BLD AUTO: 1.25 K/UL (ref 1–4.8)
MAGNESIUM SERPL-MCNC: 1.4 MG/DL (ref 1.6–2.6)
MCH RBC QN AUTO: 30.5 PG (ref 27–31)
MCHC RBC AUTO-ENTMCNC: 33.5 G/DL (ref 32–36)
MCV RBC AUTO: 91 FL (ref 82–98)
MICROSCOPIC COMMENT: NORMAL
MODE: NORMAL
NITRITE UR QL STRIP: NEGATIVE
NUCLEATED RBC (/100WBC) (OHS): 0 /100 WBC
PCO2 BLDA: 40 MMHG (ref 35–45)
PH SMN: 7.4 [PH] (ref 7.35–7.45)
PH UR STRIP: 6 [PH]
PLATELET # BLD AUTO: 380 K/UL (ref 150–450)
PMV BLD AUTO: 9.2 FL (ref 9.2–12.9)
PO2 BLDA: 85 MMHG (ref 80–100)
POC BE: 0 MMOL/L (ref -2–2)
POC MOLECULAR INFLUENZA A AGN: NEGATIVE
POC MOLECULAR INFLUENZA B AGN: NEGATIVE
POC SATURATED O2: 96 % (ref 95–100)
POTASSIUM SERPL-SCNC: 3.9 MMOL/L (ref 3.5–5.1)
PROCALCITONIN SERPL-MCNC: 0.03 NG/ML
PROT SERPL-MCNC: 8.7 GM/DL (ref 6–8.4)
PROT UR QL STRIP: NEGATIVE
RBC # BLD AUTO: 4.23 M/UL (ref 4.6–6.2)
RBC #/AREA URNS HPF: 0 /HPF (ref 0–4)
RELATIVE EOSINOPHIL (OHS): 11.9 %
RELATIVE LYMPHOCYTE (OHS): 12.9 % (ref 18–48)
RELATIVE MONOCYTE (OHS): 12.5 % (ref 4–15)
RELATIVE NEUTROPHIL (OHS): 61.6 % (ref 38–73)
SAMPLE: NORMAL
SARS-COV-2 RDRP RESP QL NAA+PROBE: NEGATIVE
SITE: NORMAL
SODIUM SERPL-SCNC: 137 MMOL/L (ref 136–145)
SP GR UR STRIP: 1.01
TROPONIN I SERPL DL<=0.01 NG/ML-MCNC: <0.006 NG/ML
TSH SERPL-ACNC: 2.44 UIU/ML (ref 0.4–4)
UROBILINOGEN UR STRIP-ACNC: NEGATIVE EU/DL
WBC # BLD AUTO: 9.7 K/UL (ref 3.9–12.7)
WBC #/AREA URNS HPF: 1 /HPF (ref 0–5)

## 2025-05-04 PROCEDURE — 96367 TX/PROPH/DG ADDL SEQ IV INF: CPT | Mod: ER

## 2025-05-04 PROCEDURE — 94640 AIRWAY INHALATION TREATMENT: CPT | Mod: ER

## 2025-05-04 PROCEDURE — 84443 ASSAY THYROID STIM HORMONE: CPT | Performed by: EMERGENCY MEDICINE

## 2025-05-04 PROCEDURE — 25000242 PHARM REV CODE 250 ALT 637 W/ HCPCS: Mod: ER | Performed by: EMERGENCY MEDICINE

## 2025-05-04 PROCEDURE — 99285 EMERGENCY DEPT VISIT HI MDM: CPT | Mod: 25,ER

## 2025-05-04 PROCEDURE — 83605 ASSAY OF LACTIC ACID: CPT | Performed by: EMERGENCY MEDICINE

## 2025-05-04 PROCEDURE — 87070 CULTURE OTHR SPECIMN AEROBIC: CPT | Performed by: EMERGENCY MEDICINE

## 2025-05-04 PROCEDURE — 96365 THER/PROPH/DIAG IV INF INIT: CPT | Mod: ER

## 2025-05-04 PROCEDURE — 82803 BLOOD GASES ANY COMBINATION: CPT | Mod: ER

## 2025-05-04 PROCEDURE — 85025 COMPLETE CBC W/AUTO DIFF WBC: CPT | Performed by: EMERGENCY MEDICINE

## 2025-05-04 PROCEDURE — 83690 ASSAY OF LIPASE: CPT | Performed by: EMERGENCY MEDICINE

## 2025-05-04 PROCEDURE — 87502 INFLUENZA DNA AMP PROBE: CPT | Mod: ER

## 2025-05-04 PROCEDURE — 84484 ASSAY OF TROPONIN QUANT: CPT | Performed by: EMERGENCY MEDICINE

## 2025-05-04 PROCEDURE — 83735 ASSAY OF MAGNESIUM: CPT | Performed by: EMERGENCY MEDICINE

## 2025-05-04 PROCEDURE — 83880 ASSAY OF NATRIURETIC PEPTIDE: CPT | Performed by: EMERGENCY MEDICINE

## 2025-05-04 PROCEDURE — 80053 COMPREHEN METABOLIC PANEL: CPT | Performed by: EMERGENCY MEDICINE

## 2025-05-04 PROCEDURE — 81003 URINALYSIS AUTO W/O SCOPE: CPT | Performed by: EMERGENCY MEDICINE

## 2025-05-04 PROCEDURE — 93005 ELECTROCARDIOGRAM TRACING: CPT | Mod: ER

## 2025-05-04 PROCEDURE — 84145 PROCALCITONIN (PCT): CPT | Performed by: EMERGENCY MEDICINE

## 2025-05-04 PROCEDURE — 36600 WITHDRAWAL OF ARTERIAL BLOOD: CPT | Mod: ER

## 2025-05-04 PROCEDURE — 25000003 PHARM REV CODE 250: Mod: ER | Performed by: EMERGENCY MEDICINE

## 2025-05-04 PROCEDURE — 99900035 HC TECH TIME PER 15 MIN (STAT): Mod: ER

## 2025-05-04 PROCEDURE — 96375 TX/PRO/DX INJ NEW DRUG ADDON: CPT | Mod: ER

## 2025-05-04 PROCEDURE — 25500020 PHARM REV CODE 255: Mod: ER | Performed by: EMERGENCY MEDICINE

## 2025-05-04 PROCEDURE — 93010 ELECTROCARDIOGRAM REPORT: CPT | Mod: ,,, | Performed by: INTERNAL MEDICINE

## 2025-05-04 PROCEDURE — 63600175 PHARM REV CODE 636 W HCPCS: Mod: ER | Performed by: EMERGENCY MEDICINE

## 2025-05-04 PROCEDURE — 87635 SARS-COV-2 COVID-19 AMP PRB: CPT | Mod: ER | Performed by: EMERGENCY MEDICINE

## 2025-05-04 PROCEDURE — 87040 BLOOD CULTURE FOR BACTERIA: CPT | Performed by: EMERGENCY MEDICINE

## 2025-05-04 RX ORDER — NAPROXEN 500 MG/1
500 TABLET ORAL 2 TIMES DAILY WITH MEALS
Qty: 14 TABLET | Refills: 0 | Status: SHIPPED | OUTPATIENT
Start: 2025-05-04

## 2025-05-04 RX ORDER — IPRATROPIUM BROMIDE AND ALBUTEROL SULFATE 2.5; .5 MG/3ML; MG/3ML
3 SOLUTION RESPIRATORY (INHALATION)
Status: COMPLETED | OUTPATIENT
Start: 2025-05-04 | End: 2025-05-04

## 2025-05-04 RX ORDER — MAGNESIUM SULFATE 1 G/100ML
1 INJECTION INTRAVENOUS ONCE
Status: COMPLETED | OUTPATIENT
Start: 2025-05-04 | End: 2025-05-04

## 2025-05-04 RX ORDER — OXYCODONE HYDROCHLORIDE 5 MG/1
5 CAPSULE ORAL EVERY 4 HOURS PRN
Qty: 12 CAPSULE | Refills: 0 | Status: SHIPPED | OUTPATIENT
Start: 2025-05-04

## 2025-05-04 RX ORDER — METHYLPREDNISOLONE SOD SUCC 125 MG
125 VIAL (EA) INJECTION
Status: COMPLETED | OUTPATIENT
Start: 2025-05-04 | End: 2025-05-04

## 2025-05-04 RX ADMIN — SODIUM CHLORIDE 1590 ML: 9 INJECTION, SOLUTION INTRAVENOUS at 02:05

## 2025-05-04 RX ADMIN — MAGNESIUM SULFATE IN DEXTROSE 1 G: 10 INJECTION, SOLUTION INTRAVENOUS at 02:05

## 2025-05-04 RX ADMIN — METHYLPREDNISOLONE SODIUM SUCCINATE 125 MG: 125 INJECTION, POWDER, FOR SOLUTION INTRAMUSCULAR; INTRAVENOUS at 02:05

## 2025-05-04 RX ADMIN — IOHEXOL 100 ML: 350 INJECTION, SOLUTION INTRAVENOUS at 02:05

## 2025-05-04 RX ADMIN — PIPERACILLIN SODIUM AND TAZOBACTAM SODIUM 4.5 G: 4; .5 INJECTION, POWDER, LYOPHILIZED, FOR SOLUTION INTRAVENOUS at 02:05

## 2025-05-04 RX ADMIN — IPRATROPIUM BROMIDE AND ALBUTEROL SULFATE 3 ML: 2.5; .5 SOLUTION RESPIRATORY (INHALATION) at 01:05

## 2025-05-04 NOTE — ED NOTES
Patient in ED c/o shortness of breath worsened since surgery in January.  Endorses dyspnea on exertion.  Wears 2 lpm nasal cannula at home.  Dr. Pepe at bedside.

## 2025-05-04 NOTE — ED PROVIDER NOTES
Encounter Date: 5/4/2025       History     Chief Complaint   Patient presents with    Shortness of Breath     Pt c/o worsening SOB and CP onset one month. Pt reports hx of COPD.      Patient presents with worsening dyspnea on exertion and cough.  He states that these symptoms have been gradually exacerbating since having a right pleurodesis for spontaneous pneumothorax in February.  He states that he feels that he cannot walk 10 ft without getting short of breath.  He has a history of COPD and pulmonary fibrosis and is normally on 2-2.5 L of home nasal cannula oxygen.  He complains of chest tightness on exertion and this has been constant for the past few days.  No radiating pain.  He denies any fevers/chills.    The history is provided by the patient and the spouse.     Review of patient's allergies indicates:   Allergen Reactions    Ancef [cefazolin] Other (See Comments)     Headaches and dizziness     Past Medical History:   Diagnosis Date    ADD (attention deficit disorder)     Anxiety     Back pain     COPD (chronic obstructive pulmonary disease)     Depression     Emphysema, unspecified     Gout     Interstitial lung disease     Neuromuscular disorder     Pneumothorax, unspecified      Past Surgical History:   Procedure Laterality Date    BACK SURGERY  10/13/2016    INJECTION OF ANESTHETIC AGENT AROUND MULTIPLE INTERCOSTAL NERVES Right 2/21/2025    Procedure: BLOCK, NERVE, INTERCOSTAL, 2 OR MORE;  Surgeon: Tim Moss MD;  Location: [facility] OR;  Service: Cardiothoracic;  Laterality: Right;  MULTIPLE INTERCOSTAL NERVE BLOCKS    NECK SURGERY      PLEURODESIS WITH VIDEO-ASSISTED THORACOSCOPIC SURGERY (VATS) Right 2/21/2025    Procedure: VATS, WITH PLEURODESIS;  Surgeon: Tim Moss MD;  Location: [facility] OR;  Service: Cardiothoracic;  Laterality: Right;  CHEMICAL AND MECHANICAL PLEURODESIS     Family History   Problem Relation Name Age of Onset    Arthritis Father      Pulmonary fibrosis Mother        Social History[1]  Review of Systems   Respiratory:  Positive for cough and shortness of breath.    Musculoskeletal:         Denies leg pain       Physical Exam     Initial Vitals [05/04/25 1305]   BP Pulse Resp Temp SpO2   117/86 (!) 118 (!) 21 97.7 °F (36.5 °C) 95 %      MAP       --         Physical Exam    Constitutional: He appears distressed.   Thin   Cardiovascular:            Tachycardic rate, regular rhythm, no murmurs noted   Pulmonary/Chest:   Tachypnea with no wheezes noted   Abdominal: Abdomen is soft. He exhibits no distension. There is no abdominal tenderness.   Musculoskeletal:         General: Normal range of motion.     Neurological: He is alert and oriented to person, place, and time.   Skin: Skin is warm and dry. No rash noted.         ED Course   Procedures  Labs Reviewed   COMPREHENSIVE METABOLIC PANEL - Abnormal       Result Value    Sodium 137      Potassium 3.9      Chloride 100      CO2 25      Glucose 90      BUN 11      Creatinine 0.7      Calcium 9.2      Protein Total 8.7 (*)     Albumin 3.3 (*)     Bilirubin Total 0.4      ALP 59      AST 19      ALT 13      Anion Gap 12      eGFR >60     URINALYSIS, REFLEX TO URINE CULTURE - Abnormal    Color, UA Straw      Appearance, UA Clear      pH, UA 6.0      Spec Grav UA 1.010      Protein, UA Negative      Glucose, UA Negative      Ketones, UA Negative      Bilirubin, UA Negative      Blood, UA Negative      Nitrites, UA Negative      Urobilinogen, UA Negative      Leukocyte Esterase, UA Trace (*)    MAGNESIUM - Abnormal    Magnesium  1.4 (*)    CBC WITH DIFFERENTIAL - Abnormal    WBC 9.70      RBC 4.23 (*)     HGB 12.9 (*)     HCT 38.5 (*)     MCV 91      MCH 30.5      MCHC 33.5      RDW 14.9 (*)     Platelet Count 380      MPV 9.2      Nucleated RBC 0      Neut % 61.6      Lymph % 12.9 (*)     Mono % 12.5      Eos % 11.9 (*)     Basophil % 0.7      Imm Grans % 0.4      Neut # 5.98      Lymph # 1.25      Mono # 1.21 (*)     Eos # 1.15 (*)      Baso # 0.07      Imm Grans # 0.04     CULTURE, BLOOD - Normal    Blood Culture No Growth After 6 Hours     CULTURE, BLOOD - Normal    Blood Culture No Growth After 6 Hours     LACTIC ACID, PLASMA - Normal    Lactic Acid Level 1.2      Narrative:     Falsely low lactic acid results can be found in samples containing >=13.0 mg/dL total bilirubin and/or >=3.5 mg/dL direct bilirubin.    TROPONIN I - Normal    Troponin-I <0.006     PROCALCITONIN - Normal    Procalcitonin 0.03     LIPASE - Normal    Lipase Level 16     B-TYPE NATRIURETIC PEPTIDE - Normal    BNP <10     TSH - Normal    TSH 2.440     CULTURE, RESPIRATORY    GRAM STAIN Rare WBC seen      GRAM STAIN Moderate Gram positive cocci      GRAM STAIN Rare Gram Negative Rods      GRAM STAIN Rare Budding yeast      GRAM STAIN       CBC W/ AUTO DIFFERENTIAL    Narrative:     The following orders were created for panel order CBC auto differential.  Procedure                               Abnormality         Status                     ---------                               -----------         ------                     CBC with Differential[[id number]]       Abnormal            Final result                 Please view results for these tests on the individual orders.   GREY TOP URINE HOLD    Extra Tube Hold for add-ons.     URINALYSIS MICROSCOPIC    RBC, UA 0      WBC, UA 1      Bacteria, UA Rare      Microscopic Comment       POCT INFLUENZA A/B MOLECULAR    POC Molecular Influenza A Ag Negative      POC Molecular Influenza B Ag Negative       Acceptable Yes     SARS-COV-2 RDRP GENE    POC Rapid COVID Negative       Acceptable Yes      Narrative:     This test utilizes isothermal nucleic acid amplification technology to detect the SARS-CoV-2 RdRp nucleic acid segment. The analytical sensitivity (limit of detection) is 500 copies/swab.     A POSITIVE result is indicative of the presence of SARS-CoV-2 RNA; clinical correlation with  patient history and other diagnostic information is necessary to determine patient infection status.    A NEGATIVE result means that SARS-CoV-2 nucleic acids are not present above the limit of detection. A NEGATIVE result should be treated as presumptive. It does not rule out the possibility of COVID-19 and should not be the sole basis for treatment decisions. If COVID-19 is strongly suspected based on clinical and exposure history, re-testing using an alternate molecular assay should be considered.     Commercial kits are provided by [employer].       ISTAT PROCEDURE    POC PH 7.399      POC PCO2 40.0      POC PO2 85      POC HCO3 24.7      POC BE 0      POC SATURATED O2 96      Sample ARTERIAL      Site RR      Allens Test Pass      DelSys Room Air      Mode SPONT      FiO2 21       EKG Readings: (Independently Interpreted)   1317: Sinus tachycardia rate of 113, right axis deviation, nonspecific T-wave changes noted     ECG Results              EKG 12-lead (Final result)        Collection Time Result Time QRS Duration OHS QTC Calculation    05/04/25 13:15:07 05/05/25 09:13:14 92 [id number]                     Final result by Interface, Lab In [facility] (05/05/25 09:13:18)                   Narrative:    Test Reason : R06.02,    Vent. Rate : 113 BPM     Atrial Rate : 113 BPM     P-R Int : 162 ms          QRS Dur :  92 ms      QT Int : 334 ms       P-R-T Axes :  69  57  61 degrees    QTcB Int : 458 ms    Sinus tachycardia  Otherwise normal ECG  When compared with ECG of 21-Mar-2025 09:15,  No significant change was found  Confirmed by Min Vernon ([id number]) on 5/5/2025 9:13:11 AM    Referred By: AAAREFERRAL SELF           Confirmed By: Min Vernon                                  Imaging Results              CTA Chest Non-Coronary (PE Studies) (Final result)  Result time 05/04/25 15:01:17      Final result by Tenzin Brasher MD (05/04/25 15:01:17)                   Impression:     No evidence of acute PE.  Severe  high-grade chronic interstitial pulmonary fibrosis with bronchiectasis and honeycombing.        Finalized on: 5/4/2025 3:01 PM By:  Tenzin Brasher MD  [facility]# [id number]      2025-05-04 15:03:26.939     [facility]               Narrative:    EXAM:    CTA CHEST NON CORONARY (PE STUDIES)    CLINICAL HISTORY:    Hemoptysis    TECHNIQUE: Standard contrast-enhanced CTA of the chest performed with 100 mL Omnipaque 350 utilizing 3-D MIP reformats.  All CT scans at [this location] are performed using dose modulation techniques as appropriate to a performed exam including the following: automated exposure control; adjustment of the mA and/or kV according to patient size (this includes techniques or standardized protocols for targeted exams where dose is matched to indication / reason for exam; i.e. extremities or head); use of iterative reconstruction technique.    FINDINGS:  Comparison with CT chest with contrast 02/18/2025    The cardiac size is nonenlarged.  High-grade multivessel coronary artery calcification is seen.    The pulmonary arteries are normal in enhancement pattern.  No filling defects.    There is severe interstitial lung disease consistent with interstitial pulmonary fibrosis.  Bronchiectasis is present as well with honeycombing in the lung bases.    Minimal left pleural effusion.    In the upper abdomen no acute findings.                                         X-Ray Chest AP Portable (Final result)  Result time 05/04/25 14:07:30      Final result by Tenzin Brasher MD (05/04/25 14:07:30)                   Impression:     Chronic interstitial pulmonary fibrosis.    Finalized on: 5/4/2025 2:07 PM By:  Tenzin Brasher MD  [facility]# [id number]      2025-05-04 14:09:34.014     [facility]               Narrative:    EXAM:    XR CHEST AP PORTABLE    CLINICAL HISTORY:    Shortness of breath    TECHNIQUE: Single view of the chest.    COMPARISON: 03/21/2025 x-ray    FINDINGS:  Heart size is difficult to assess.  There is  severe chronic interstitial lung disease particularly in the right lung apex.                                         Medications   sodium chloride 0.9% bolus 1,590 mL 1,590 mL (0 mLs Intravenous Stopped 5/4/25 1503)   piperacillin-tazobactam (ZOSYN) 4.5 g in D5W 100 mL IVPB (MB+) (0 g Intravenous Stopped 5/4/25 1435)   albuterol-ipratropium 2.5 mg-0.5 mg/3 mL nebulizer solution 3 mL (3 mLs Nebulization Given 5/4/25 1335)   methylPREDNISolone sodium succinate injection 125 mg (125 mg Intravenous Given 5/4/25 1406)   magnesium sulfate in dextrose IVPB (premix) 1 g (1 g Intravenous Not Given 5/4/25 1530)   iohexoL (OMNIPAQUE 350) injection 100 mL (100 mLs Intravenous Given 5/4/25 1447)     Medical Decision Making  Patient informed of pulmonology recommendations.  He was able to walk out of the department and appeared in no respiratory distress.  He was able to tolerate fluids at the time of discharge.  He is instructed to return immediately for any new or worsening symptoms and he verbalized understanding.    Amount and/or Complexity of Data Reviewed  Labs: ordered. Decision-making details documented in ED Course.  Radiology: ordered. Decision-making details documented in ED Course.  Discussion of management or test interpretation with external provider(s): [id number]:  Spoke to Dr. Dia, pulmonology, discussed case reviewed lab work and radiology findings.  He believes symptoms are most likely related to pleurisy and recommends and NSAID trial with follow-up.    Risk  Prescription drug management.  Risk Details: Differential diagnosis includes but is not limited to: ACS, heart failure, pneumonia, pulmonary embolism, electrolyte abnormality, pleurisy, pneumothorax               ED Course as of 05/06/25 0527   Sun May 04, 2025   1410 X-Ray Chest AP Portable  Heart size is difficult to assess.  There is severe chronic interstitial lung disease particularly in the right lung apex. [CD]   1504 CTA Chest Non-Coronary (PE  "Studies)  The cardiac size is nonenlarged.  High-grade multivessel coronary artery calcification is seen.     The pulmonary arteries are normal in enhancement pattern.  No filling defects.     There is severe interstitial lung disease consistent with interstitial pulmonary fibrosis.  Bronchiectasis is present as well with honeycombing in the lung bases.     Minimal left pleural effusion.     In the upper abdomen no acute findings.   [CD]   1535 ISTAT PROCEDURE  No acidosis [CD]   1536 Brain natriuretic peptide  Within normal limits [CD]   1536 CBC auto differential(!)  Nonspecific findings [CD]   1536 POCT Influenza A/B Molecular  Negative [CD]   1536 POCT COVID-19 Rapid Screening  Negative [CD]   1536 Lactic acid, plasma #1  Within normal limits [CD]   1536 Magnesium(!)  Hypomagnesemia, will administer IV magnesium and recheck [CD]   1536 Troponin I  Within normal limits [CD]   1536 TSH  Within normal limits [CD]   1536 Blood culture x two cultures. Draw prior to antibiotics.  This is pending [CD]   1536 Culture, Respiratory with Gram Stain  This is pending [CD]   1536 Blood culture x two cultures. Draw prior to antibiotics.  Pending [CD]   1536 Procalcitonin  Within normal limits [CD]   1536 Lipase  Within normal limits [CD]   1537 Comprehensive metabolic panel(!)  Nonspecific findings [CD]   1550 Urinalysis, Reflex to Urine Culture Urine, Clean Catch(!)  No UTI [CD]   1550 Urinalysis Microscopic  No UTI [CD]      ED Course User Index  [CD] Jonathon Pepe, DO    Sepsis Perfusion Assessment: "I attest a sepsis perfusion exam was performed within 6 hours of sepsis, severe sepsis, or septic shock presentation, following fluid resuscitation."                      Clinical Impression:  Final diagnoses:  [R06.02] SOB (shortness of breath)  [J84.10] Pulmonary fibrosis (Primary)  [R07.9] Chest pain, unspecified type          ED Disposition Condition    Discharge Stable          ED Prescriptions       Medication Sig " Dispense Start Date End Date Auth. Provider    oxyCODONE (OXY-IR) 5 mg Cap Take 1 capsule (5 mg total) by mouth every 4 (four) hours as needed for Pain. 12 capsule 5/4/2025 -- Jonathon Pepe DO    naproxen (NAPROSYN) 500 MG tablet Take 1 tablet (500 mg total) by mouth 2 (two) times daily with meals. 14 tablet 5/4/2025 -- Jonathon Peep DO          Follow-up Information       Follow up With Specialties Details Why Contact Info    Kun Abdul MD Pulmonary Disease Schedule an appointment as soon as possible for a visit   [address] [place] [address] [address] [place] [address] [phone]                   [1]   Social History  Tobacco Use    Smoking status: Former     Current packs/day: 1.00     Types: Cigarettes    Smokeless tobacco: Never   Substance Use Topics    Alcohol use: Not Currently     Comment: occassionally     Drug use: No        Jonathon Pepe DO  05/06/25 [id number]

## 2025-05-04 NOTE — DISCHARGE INSTRUCTIONS
1.)  Make sure to make your pulmonology clinic scheduled appointment on the 6th of this month without fail.    2.)  Call Dr. Abdul's office tomorrow and let them know that you were seen in the emergency department and schedule a follow-up visit.    3.)  Return immediately for any new or worsening symptoms.

## 2025-05-04 NOTE — ED NOTES
Urine specimen collection pending patient voiding.  RN will continue to monitor and send specimen once available.

## 2025-05-05 LAB
OHS QRS DURATION: 92 MS
OHS QTC CALCULATION: 458 MS

## 2025-05-06 ENCOUNTER — CLINICAL SUPPORT (OUTPATIENT)
Dept: PULMONOLOGY | Facility: CLINIC | Age: 60
End: 2025-05-06
Payer: MEDICARE

## 2025-05-06 VITALS
HEIGHT: 68 IN | HEART RATE: 108 BPM | RESPIRATION RATE: 16 BRPM | BODY MASS INDEX: 17.81 KG/M2 | WEIGHT: 117.5 LBS | OXYGEN SATURATION: 94 %

## 2025-05-06 DIAGNOSIS — J44.9 CHRONIC OBSTRUCTIVE PULMONARY DISEASE, UNSPECIFIED COPD TYPE: ICD-10-CM

## 2025-05-06 PROCEDURE — 99999 PR PBB SHADOW E&M-EST. PATIENT-LVL IV: CPT | Mod: PBBFAC,,,

## 2025-05-06 NOTE — PROGRESS NOTES
"  Pulmonary Disease Management  [facility]  Initial Visit      Diagnosis: Pulmonary Fibrosis   Last Hospital Admission: 2/18/25  Last provider visit: 4/25/25    Current Medications[1]    Review of patient's allergies indicates:   Allergen Reactions    Ancef [cefazolin] Other (See Comments)     Headaches and dizziness       Smoking history: Tobacco Use History[2]    Pulse: 108  SpO2: (!) 94 %  Resp: 16  Height: 5' 8" (172.7 cm)  Weight: 53.3 kg (117 lb 8.1 oz)  BMI (kg/m2): 17.9     PFT Results:  Pre FVC   Date/Time Value Ref Range Status   04/03/2025 02:07 PM 1.68 (L) 3.34 - 5.40 L Final     Pre FEV1   Date/Time Value Ref Range Status   04/03/2025 02:07 PM 1.54 (L) 2.57 - 4.23 L Final     Pre FEV1 FVC   Date/Time Value Ref Range Status   04/03/2025 02:07 PM 91.83 (H) 65.98 - 89.90 % Final     Pre TLC   Date/Time Value Ref Range Status   04/03/2025 02:07 PM 4.48 (L) 5.59 - 7.89 L Final     Pre DLCO   Date/Time Value Ref Range Status   04/03/2025 02:07 PM 7.28 (L) 20.83 - 34.69 ml/(min*mmHg) Final       Educational assessment:   [x]            Good  []            Fair  []            Poor    Readiness to learn:   [x]            Good  []            Fair  []            Poor    Vision Status:   [x]            Good  []            Fair  []            Poor    Reading Ability:  [x]            Good  []            Fair  []            Poor    Knowledge of condition:   [x]            Good  []            Fair  []            Poor    Language Barriers:   []            Good  []            Fair  []            Poor  [x]            None    Cognitive/ Physical Barriers:   []            Good  []            Fair  []            Poor  [x]            None    Learning best by:                       [x]            Seeing  [x]            Hearing  [x]            Reading                         [x]            Doing    Describe any barrier /Limitation or financial implications of care choices identified     []            Financial  []    "         Emotional  []            Education  []            Vision/Hearing  []            Physical  [x]            None  []                TOPIC /CONTENT FOR TODAY:    [x]            MDI with or without spacer  [x]            Dry powder inhaler  []            Acapella   []           Peak Flow meter  [x]            Action plan  [x]            Nebulizer use  [x]            Oxygen use safety  [x]            Breathing and cough techniques  [x]            Energy conservation  [x]            Infection prevention  []           OTHER________________________        Learner:    [x]            Patient   []            Caregiver    Method:    [x]            Verbal explanation  [x]            Audio visual    [x]            Literature  [x]            Teach back      Evaluation:    [x]            Teach back  [x]            Demonstrate  [x]            Follow up phone call    []            2 weeks     []            4 weeks   [x]            PRN      Therapist Comments:   Patient was seen today to review respiratory medication purpose and proper technique for use of inhalers. Patient practiced proper technique using MDI with valved holding chamber (spacer) and DPI inhalers. Patient demonstrated understanding. Literature was given to patient.    Educated patient on the importance of utilizing supplemental oxygen when prescribed. Reviewed strategies for maximizing energy. Patient verbalized understanding. Literature given to patient.      Asthma trigger checklist was verbally reviewed and literature given to patient.     Infection prevention was discussed. Patient was reminded to get influenza vaccine. Hand hygiene and cleaning of respiratory equipment was also discussed. Patient verbalized understanding.      Action plan was reviewed and literature was given to patient. Patient verbalized understanding.     Plan:  Monthly Pulmonary Disease Management Questionnaire  Follow-up PDM appointment scheduled for 11/6/25  Reinforce  education  Meds: Gio Smithoneb  DME Needs: [facility]  Action Plan  Immunization: Pneumococcal- DUE, Flu-current   Next Provider Visit: 7/17/25  Next Spirometry/CPFT: not scheduled   Approximate time spent with patient: 60 mins         [1]   Current Outpatient Medications:     albuterol (PROVENTIL/VENTOLIN HFA) 90 mcg/actuation inhaler, Inhale 2 puffs into the lungs every 6 (six) hours as needed., Disp: , Rfl:     albuterol-ipratropium (DUO-NEB) 2.5 mg-0.5 mg/3 mL nebulizer solution, Take 3 mLs by nebulization every 6 (six) hours as needed for Wheezing or Shortness of Breath. Rescue, Disp: 360 mL, Rfl: 3    allopurinol (ZYLOPRIM) 100 MG tablet, Take 100 mg by mouth 3 (three) times daily., Disp: , Rfl:     alprazolam (XANAX) 1 MG tablet, Take 2 mg by mouth nightly as needed for Anxiety. , Disp: , Rfl:     clonazePAM (KLONOPIN) 1 MG tablet, Take 1 mg by mouth 2 (two) times daily as needed for Anxiety., Disp: , Rfl:     cyanocobalamin (VITAMIN B-12) 1000 MCG tablet, Take 1 tablet by mouth every morning., Disp: , Rfl:     fluticasone-salmeterol diskus inhaler 500-50 mcg, Inhale 1 puff into the lungs 2 (two) times daily. Controller, Disp: 60 each, Rfl: 5    gabapentin (NEURONTIN) 100 MG capsule, Take 100 mg by mouth as needed., Disp: , Rfl:     lisdexamfetamine (VYVANSE) 30 MG capsule, Take 30 mg by mouth every morning., Disp: , Rfl:     methocarbamol (ROBAXIN) 500 MG Tab, Take 500 mg by mouth., Disp: , Rfl:     methylphenidate HCl (METADATE ER) 10 MG ER tablet, Take 10 mg by mouth., Disp: , Rfl:     methylPREDNISolone (MEDROL DOSEPACK) 4 mg tablet, As per package label, Disp: 1 each, Rfl: 0    naproxen (NAPROSYN) 500 MG tablet, Take 1 tablet (500 mg total) by mouth 2 (two) times daily with meals., Disp: 14 tablet, Rfl: 0    oxyCODONE (OXY-IR) 5 mg Cap, Take 1 capsule (5 mg total) by mouth every 4 (four) hours as needed for Pain., Disp: 12 capsule, Rfl: 0    varenicline tartrate (CHANTIX EILEEN) 0.5 mg (11)- 1 mg (42)  tablet, use as directed, Disp: , Rfl:     varenicline tartrate (CHANTIX) 1 mg Tab, Take 1 mg by mouth., Disp: , Rfl:   [2]   Social History  Tobacco Use   Smoking Status Former    Current packs/day: 1.00    Types: Cigarettes   Smokeless Tobacco Never

## 2025-05-06 NOTE — PATIENT INSTRUCTIONS
Using an Inhaler with a Spacer  To control asthma, you need to use your medicines the right way. Some medicines are inhaled using a device called a metered-dose inhaler (MDI). Metered-dose inhalers deliver medicine with a fine spray. You may be asked to use a spacer (holding tube) with your inhaler. The spacer helps make sure all the medicine you need goes into your lungs.   Steps for using an inhaler with a spacer  Step 1:  Remove the caps from the inhaler and spacer.  Shake the inhaler well and attach the spacer. If the inhaler is being used for the first time or has not been used for a while, prime it as directed by the product maker.  Step 2:  Breathe out normally.  Put the spacer between your teeth. Close your lips tightly around it.  Keep your chin up.  Step 3:  Spray 1 puff into the spacer by pressing down on the inhaler.  Then breathe in through your mouth as slowly and deeply as you can. This should take about 5-10 seconds. If you breathe too quickly, you may hear a whistling sound in certain spacers.  Step 4:  Take the spacer out of your mouth.  Hold your breath for a count of 10.  Then hold your lips together and slowly breathe out through your mouth  Using Dry-Powder Inhalers (DPIs)  Some asthma medications are inhaled using inhalers. Dry-powder inhalers (DPIs) dispense measured doses of powdered medicine into your lungs. DPIs often have counters to track the number of doses used. Keep in mind that DPIs don't all work the same way. So be sure you know how to use yours properly.  Using a DPI  Load the prescribed dose of medicine by following the instructions that came with the inhaler.  Breathe out normally, holding the inhaler away from your mouth. Hold your chin up.  Put the mouthpiece between your lips. Breathe in deeply through the inhaler--not through your nose. You may not feel or taste the medicine as you breathe in. This is normal.  Take the mouthpiece out of your mouth. Hold your breath for a  count of 10, or as long as you can comfortably.  Breathe out slowly--but do not breathe out through the inhaler. Moisture from your breath can make the powder stick inside the inhaler.  Be sure to close the inhaler and store it in a dry place.  Rinse your mouth with water and spit it out, don't swallow it.  Do not wash your DPI with soap and water. To clean the mouthpiece, wipe with a dry cloth as needed.    © 8122-0211 [employer]. [address] [place] [address], [place] [address]. All rights reserved. This information is not intended as a substitute for professional medical care. Always follow your healthcare professional's instructions.           ACTION PLAN    GREEN ZONE  My sputum is clear/white/usual color and easily cleared.  My breathing is no harder than usual.  I can do my usual activities.  I can think clearly.   Take your usual medicines, including oxygen, as you are told to do so by your health care provider.   YELLOW ZONE  My sputum has change (color, thickness, amount).  I am more short of breath than usual.  I cough or wheeze more.  I weigh more and my legs/feet swell.  I cannot do my usual activities without resting.   Call your health care provider. You will probably be told to begin taking an antibiotic and prednisone. Have your pharmacy phone number available.   RED ZONE  I cannot cough out my sputum.  I am much more short of breath than normal.  I need to sit up to breathe  I cannot do my usual activities.  I am unable to speak more than one or two words at a time.  I am confused.   Call your health care provider. You may be asked to come in to be seen, told to go to the emergency room, or told to call 9-1-1.

## 2025-05-07 ENCOUNTER — TELEPHONE (OUTPATIENT)
Dept: PULMONOLOGY | Facility: CLINIC | Age: 60
End: 2025-05-07
Payer: MEDICARE

## 2025-05-07 NOTE — TELEPHONE ENCOUNTER
Chronic Disease Management  The patient had called different pharmacies to determine if they had oxycodone in stock. When he was told the medication is not in stock he assumed they could not get the medication. Advised patient to take prescription to pharmacy so they can order oxycodone for him.    Time  spent with patient:  Minutes

## 2025-05-08 LAB
BACTERIA SPEC CULT: ABNORMAL
BACTERIA SPEC CULT: ABNORMAL
GRAM STN SPEC: ABNORMAL

## 2025-05-10 ENCOUNTER — RESULTS FOLLOW-UP (OUTPATIENT)
Dept: EMERGENCY MEDICINE | Facility: HOSPITAL | Age: 60
End: 2025-05-10

## 2025-05-10 ENCOUNTER — PATIENT MESSAGE (OUTPATIENT)
Dept: PULMONOLOGY | Facility: CLINIC | Age: 60
End: 2025-05-10
Payer: MEDICARE

## 2025-05-10 DIAGNOSIS — A49.8 PROTEUS INFECTION: Primary | ICD-10-CM

## 2025-05-10 LAB
BACTERIA BLD CULT: NORMAL
BACTERIA BLD CULT: NORMAL

## 2025-05-10 RX ORDER — LEVOFLOXACIN 500 MG/1
500 TABLET, FILM COATED ORAL DAILY
Qty: 7 TABLET | Refills: 0 | Status: SHIPPED | OUTPATIENT
Start: 2025-05-10

## 2025-06-02 ENCOUNTER — TELEPHONE (OUTPATIENT)
Dept: PULMONOLOGY | Facility: CLINIC | Age: 60
End: 2025-06-02
Payer: MEDICARE

## 2025-06-04 ENCOUNTER — TELEPHONE (OUTPATIENT)
Dept: PULMONOLOGY | Facility: CLINIC | Age: 60
End: 2025-06-04

## 2025-06-04 ENCOUNTER — HOSPITAL ENCOUNTER (EMERGENCY)
Facility: HOSPITAL | Age: 60
Discharge: HOME | End: 2025-06-04
Attending: EMERGENCY MEDICINE
Payer: MEDICARE

## 2025-06-04 ENCOUNTER — OFFICE VISIT (OUTPATIENT)
Dept: PULMONOLOGY | Facility: CLINIC | Age: 60
End: 2025-06-04
Payer: MEDICARE

## 2025-06-04 ENCOUNTER — CLINICAL SUPPORT (OUTPATIENT)
Dept: PULMONOLOGY | Facility: CLINIC | Age: 60
End: 2025-06-04
Payer: MEDICARE

## 2025-06-04 VITALS
DIASTOLIC BLOOD PRESSURE: 78 MMHG | RESPIRATION RATE: 18 BRPM | HEIGHT: 68 IN | OXYGEN SATURATION: 98 % | WEIGHT: 117.5 LBS | BODY MASS INDEX: 17.81 KG/M2 | HEART RATE: 105 BPM | SYSTOLIC BLOOD PRESSURE: 132 MMHG

## 2025-06-04 VITALS
RESPIRATION RATE: 24 BRPM | WEIGHT: 117 LBS | HEART RATE: 110 BPM | OXYGEN SATURATION: 94 % | HEIGHT: 68 IN | SYSTOLIC BLOOD PRESSURE: 109 MMHG | TEMPERATURE: 98 F | BODY MASS INDEX: 17.73 KG/M2 | DIASTOLIC BLOOD PRESSURE: 66 MMHG

## 2025-06-04 DIAGNOSIS — J44.9 COPD, SEVERE: ICD-10-CM

## 2025-06-04 DIAGNOSIS — J84.10 PULMONARY FIBROSIS: ICD-10-CM

## 2025-06-04 DIAGNOSIS — J44.9 COPD, SEVERE: Primary | ICD-10-CM

## 2025-06-04 DIAGNOSIS — A49.8 PROTEUS INFECTION: ICD-10-CM

## 2025-06-04 DIAGNOSIS — Z99.81 CHRONIC RESPIRATORY FAILURE WITH HYPOXIA, ON HOME O2 THERAPY: ICD-10-CM

## 2025-06-04 DIAGNOSIS — Z87.891 FORMER HEAVY TOBACCO SMOKER: ICD-10-CM

## 2025-06-04 DIAGNOSIS — Z99.81 ON HOME O2: ICD-10-CM

## 2025-06-04 DIAGNOSIS — J44.1 COPD EXACERBATION: Primary | ICD-10-CM

## 2025-06-04 DIAGNOSIS — J96.11 CHRONIC RESPIRATORY FAILURE WITH HYPOXIA, ON HOME O2 THERAPY: ICD-10-CM

## 2025-06-04 DIAGNOSIS — Z13.6 SCREENING FOR CARDIOVASCULAR CONDITION: ICD-10-CM

## 2025-06-04 LAB
ABSOLUTE EOSINOPHIL (OHS): 1.32 K/UL
ABSOLUTE MONOCYTE (OHS): 1.53 K/UL (ref 0.3–1)
ABSOLUTE NEUTROPHIL COUNT (OHS): 9.67 K/UL (ref 1.8–7.7)
ALBUMIN SERPL BCP-MCNC: 3.1 G/DL (ref 3.5–5.2)
ALLENS TEST: ABNORMAL
ALP SERPL-CCNC: 74 UNIT/L (ref 40–150)
ALT SERPL W/O P-5'-P-CCNC: 16 UNIT/L (ref 10–44)
ANION GAP (OHS): 13 MMOL/L (ref 8–16)
AST SERPL-CCNC: 19 UNIT/L (ref 11–45)
BASOPHILS # BLD AUTO: 0.09 K/UL
BASOPHILS NFR BLD AUTO: 0.6 %
BILIRUB SERPL-MCNC: 0.4 MG/DL (ref 0.1–1)
BNP SERPL-MCNC: <10 PG/ML (ref 0–99)
BUN SERPL-MCNC: 16 MG/DL (ref 6–20)
CALCIUM SERPL-MCNC: 9.7 MG/DL (ref 8.7–10.5)
CHLORIDE SERPL-SCNC: 99 MMOL/L (ref 95–110)
CO2 SERPL-SCNC: 24 MMOL/L (ref 23–29)
CREAT SERPL-MCNC: 0.7 MG/DL (ref 0.5–1.4)
DELSYS: ABNORMAL
ERYTHROCYTE [DISTWIDTH] IN BLOOD BY AUTOMATED COUNT: 13.8 % (ref 11.5–14.5)
FIO2: 21
GFR SERPLBLD CREATININE-BSD FMLA CKD-EPI: >60 ML/MIN/1.73/M2
GLUCOSE SERPL-MCNC: 98 MG/DL (ref 70–110)
HCO3 UR-SCNC: 27.9 MMOL/L (ref 24–28)
HCT VFR BLD AUTO: 37.5 % (ref 40–54)
HGB BLD-MCNC: 12.8 GM/DL (ref 14–18)
IMM GRANULOCYTES # BLD AUTO: 0.11 K/UL (ref 0–0.04)
IMM GRANULOCYTES NFR BLD AUTO: 0.8 % (ref 0–0.5)
LACTATE SERPL-SCNC: 1.3 MMOL/L (ref 0.5–2.2)
LYMPHOCYTES # BLD AUTO: 1.5 K/UL (ref 1–4.8)
MAGNESIUM SERPL-MCNC: 1.5 MG/DL (ref 1.6–2.6)
MCH RBC QN AUTO: 30.4 PG (ref 27–31)
MCHC RBC AUTO-ENTMCNC: 34.1 G/DL (ref 32–36)
MCV RBC AUTO: 89 FL (ref 82–98)
MODE: ABNORMAL
NUCLEATED RBC (/100WBC) (OHS): 0 /100 WBC
PCO2 BLDA: 41.8 MMHG (ref 35–45)
PH SMN: 7.43 [PH] (ref 7.35–7.45)
PLATELET # BLD AUTO: 494 K/UL (ref 150–450)
PMV BLD AUTO: 9 FL (ref 9.2–12.9)
PO2 BLDA: 65 MMHG (ref 80–100)
POC BE: 4 MMOL/L (ref -2–2)
POC SATURATED O2: 93 % (ref 95–100)
POTASSIUM SERPL-SCNC: 4.2 MMOL/L (ref 3.5–5.1)
PROCALCITONIN SERPL-MCNC: 0.06 NG/ML
PROT SERPL-MCNC: 9.1 GM/DL (ref 6–8.4)
RBC # BLD AUTO: 4.21 M/UL (ref 4.6–6.2)
RELATIVE EOSINOPHIL (OHS): 9.3 %
RELATIVE LYMPHOCYTE (OHS): 10.5 % (ref 18–48)
RELATIVE MONOCYTE (OHS): 10.8 % (ref 4–15)
RELATIVE NEUTROPHIL (OHS): 68 % (ref 38–73)
SAMPLE: ABNORMAL
SITE: ABNORMAL
SODIUM SERPL-SCNC: 136 MMOL/L (ref 136–145)
TROPONIN I SERPL DL<=0.01 NG/ML-MCNC: <0.006 NG/ML
WBC # BLD AUTO: 14.22 K/UL (ref 3.9–12.7)

## 2025-06-04 PROCEDURE — 99285 EMERGENCY DEPT VISIT HI MDM: CPT | Mod: 25,ER

## 2025-06-04 PROCEDURE — 87040 BLOOD CULTURE FOR BACTERIA: CPT | Performed by: EMERGENCY MEDICINE

## 2025-06-04 PROCEDURE — 83735 ASSAY OF MAGNESIUM: CPT | Mod: ER | Performed by: EMERGENCY MEDICINE

## 2025-06-04 PROCEDURE — 84145 PROCALCITONIN (PCT): CPT | Mod: ER | Performed by: EMERGENCY MEDICINE

## 2025-06-04 PROCEDURE — 96375 TX/PRO/DX INJ NEW DRUG ADDON: CPT | Mod: ER

## 2025-06-04 PROCEDURE — 96361 HYDRATE IV INFUSION ADD-ON: CPT | Mod: ER

## 2025-06-04 PROCEDURE — 25000242 PHARM REV CODE 250 ALT 637 W/ HCPCS: Mod: ER | Performed by: EMERGENCY MEDICINE

## 2025-06-04 PROCEDURE — 93005 ELECTROCARDIOGRAM TRACING: CPT | Mod: ER

## 2025-06-04 PROCEDURE — 83605 ASSAY OF LACTIC ACID: CPT | Mod: ER | Performed by: EMERGENCY MEDICINE

## 2025-06-04 PROCEDURE — 93010 ELECTROCARDIOGRAM REPORT: CPT | Mod: ,,, | Performed by: INTERNAL MEDICINE

## 2025-06-04 PROCEDURE — 94640 AIRWAY INHALATION TREATMENT: CPT | Mod: ER,XB

## 2025-06-04 PROCEDURE — 85025 COMPLETE CBC W/AUTO DIFF WBC: CPT | Mod: ER | Performed by: EMERGENCY MEDICINE

## 2025-06-04 PROCEDURE — 99999 PR PBB SHADOW E&M-EST. PATIENT-LVL V: CPT | Mod: PBBFAC,,, | Performed by: INTERNAL MEDICINE

## 2025-06-04 PROCEDURE — 80053 COMPREHEN METABOLIC PANEL: CPT | Mod: ER | Performed by: EMERGENCY MEDICINE

## 2025-06-04 PROCEDURE — 96365 THER/PROPH/DIAG IV INF INIT: CPT | Mod: ER

## 2025-06-04 PROCEDURE — 63600175 PHARM REV CODE 636 W HCPCS: Mod: ER | Performed by: EMERGENCY MEDICINE

## 2025-06-04 PROCEDURE — 25000003 PHARM REV CODE 250: Mod: ER | Performed by: EMERGENCY MEDICINE

## 2025-06-04 PROCEDURE — 83880 ASSAY OF NATRIURETIC PEPTIDE: CPT | Mod: ER | Performed by: EMERGENCY MEDICINE

## 2025-06-04 PROCEDURE — 94761 N-INVAS EAR/PLS OXIMETRY MLT: CPT | Mod: ER,XB

## 2025-06-04 PROCEDURE — 84484 ASSAY OF TROPONIN QUANT: CPT | Mod: ER | Performed by: EMERGENCY MEDICINE

## 2025-06-04 RX ORDER — LEVOFLOXACIN 500 MG/1
500 TABLET, FILM COATED ORAL DAILY
Qty: 7 TABLET | Refills: 0 | Status: ACTIVE | OUTPATIENT
Start: 2025-06-04

## 2025-06-04 RX ORDER — CEFEPIME HYDROCHLORIDE 2 G/1
2 INJECTION, POWDER, FOR SOLUTION INTRAVENOUS
Status: COMPLETED | OUTPATIENT
Start: 2025-06-04 | End: 2025-06-04

## 2025-06-04 RX ORDER — HYDROCODONE BITARTRATE AND ACETAMINOPHEN 5; 325 MG/1; MG/1
TABLET ORAL
COMMUNITY
Start: 2025-05-19

## 2025-06-04 RX ORDER — MAGNESIUM SULFATE 1 G/100ML
1 INJECTION INTRAVENOUS ONCE
Status: COMPLETED | OUTPATIENT
Start: 2025-06-04 | End: 2025-06-04

## 2025-06-04 RX ORDER — LEVOFLOXACIN 750 MG/1
750 TABLET, FILM COATED ORAL DAILY
Qty: 7 TABLET | Refills: 0 | Status: SHIPPED | OUTPATIENT
Start: 2025-06-05 | End: 2025-06-12

## 2025-06-04 RX ORDER — BUSPIRONE HYDROCHLORIDE 7.5 MG/1
7.5 TABLET ORAL 3 TIMES DAILY
COMMUNITY

## 2025-06-04 RX ORDER — METHYLPREDNISOLONE SOD SUCC 125 MG
125 VIAL (EA) INJECTION
Status: COMPLETED | OUTPATIENT
Start: 2025-06-04 | End: 2025-06-04

## 2025-06-04 RX ORDER — FLUTICASONE PROPIONATE AND SALMETEROL 100; 50 UG/1; UG/1
POWDER RESPIRATORY (INHALATION)
COMMUNITY
Start: 2025-04-25 | End: 2025-06-04 | Stop reason: CLARIF

## 2025-06-04 RX ORDER — ALPRAZOLAM 0.25 MG/1
0.25 TABLET ORAL
COMMUNITY
Start: 2025-04-25

## 2025-06-04 RX ORDER — PREDNISONE 20 MG/1
40 TABLET ORAL DAILY
Qty: 10 TABLET | Refills: 0 | Status: SHIPPED | OUTPATIENT
Start: 2025-06-05 | End: 2025-06-10

## 2025-06-04 RX ORDER — IPRATROPIUM BROMIDE AND ALBUTEROL SULFATE 2.5; .5 MG/3ML; MG/3ML
3 SOLUTION RESPIRATORY (INHALATION)
Status: COMPLETED | OUTPATIENT
Start: 2025-06-04 | End: 2025-06-04

## 2025-06-04 RX ADMIN — IPRATROPIUM BROMIDE AND ALBUTEROL SULFATE 3 ML: 2.5; .5 SOLUTION RESPIRATORY (INHALATION) at 02:06

## 2025-06-04 RX ADMIN — MAGNESIUM SULFATE IN DEXTROSE 1 G: 10 INJECTION, SOLUTION INTRAVENOUS at 03:06

## 2025-06-04 RX ADMIN — CEFEPIME 2 G: 2 INJECTION, POWDER, FOR SOLUTION INTRAVENOUS at 02:06

## 2025-06-04 RX ADMIN — SODIUM CHLORIDE 1593 ML: 9 INJECTION, SOLUTION INTRAVENOUS at 02:06

## 2025-06-04 RX ADMIN — METHYLPREDNISOLONE SODIUM SUCCINATE 125 MG: 125 INJECTION, POWDER, FOR SOLUTION INTRAMUSCULAR; INTRAVENOUS at 02:06

## 2025-06-04 NOTE — TELEPHONE ENCOUNTER
Chronic Disease Management:  Patient called in to [facility] with concerns regarding severe dyspnea. Patient advised to be evaluated at nearest ED.

## 2025-06-04 NOTE — ED PROVIDER NOTES
Encounter Date: 6/4/2025       History     Chief Complaint   Patient presents with    Shortness of Breath     Sob and chest pain getting worse.     This patient has a history of severe COPD and pulmonary fibrosis presents to the department today with exertional dyspnea and productive cough for the past few days.  He is worried about having a bacterial infection in his lungs.  He was seen by his pulmonologist today, started on  nintedanib to try and slow the progression of his pulmonary fibrosis.  He does wear home oxygen.  He admits to intermittent chest pain that he states is relieved with coughing up some of the phlegm.  He denies any nausea/vomiting.    The history is provided by the patient (His sister).     Review of patient's allergies indicates:   Allergen Reactions    Ancef [cefazolin] Other (See Comments)     Headaches and dizziness     Past Medical History:   Diagnosis Date    ADD (attention deficit disorder)     Anxiety     Back pain     COPD (chronic obstructive pulmonary disease)     Depression     Emphysema, unspecified     Gout     Interstitial lung disease     Neuromuscular disorder     Pneumothorax, unspecified      Past Surgical History:   Procedure Laterality Date    BACK SURGERY  10/13/2016    INJECTION OF ANESTHETIC AGENT AROUND MULTIPLE INTERCOSTAL NERVES Right 2/21/2025    Procedure: BLOCK, NERVE, INTERCOSTAL, 2 OR MORE;  Surgeon: Tim Moss MD;  Location: [facility] OR;  Service: Cardiothoracic;  Laterality: Right;  MULTIPLE INTERCOSTAL NERVE BLOCKS    NECK SURGERY      PLEURODESIS WITH VIDEO-ASSISTED THORACOSCOPIC SURGERY (VATS) Right 2/21/2025    Procedure: VATS, WITH PLEURODESIS;  Surgeon: Tim Moss MD;  Location: [facility] OR;  Service: Cardiothoracic;  Laterality: Right;  CHEMICAL AND MECHANICAL PLEURODESIS     Family History   Problem Relation Name Age of Onset    Arthritis Father      Pulmonary fibrosis Mother       Social History[1]  Review of Systems   Respiratory:  Positive  for cough and shortness of breath.        Physical Exam     Initial Vitals [06/04/25 1416]   BP Pulse Resp Temp SpO2   (!) 122/59 110 20 97.7 °F (36.5 °C) 95 %      MAP       --         Physical Exam    Vitals reviewed.  Constitutional: He appears distressed.   Cardiovascular:            Tachycardic rate, regular rhythm, no murmurs   Pulmonary/Chest:   Decreased breath sounds bilaterally   Abdominal: Abdomen is soft. He exhibits no distension. There is no abdominal tenderness.   Musculoskeletal:         General: Normal range of motion.     Neurological: He is alert and oriented to person, place, and time. He has normal strength. No sensory deficit.   Skin: Skin is warm and dry. No rash noted.         ED Course   Critical Care    Date/Time: 6/4/2025 5:00 PM    Performed by: Jonathon Pepe DO  Authorized by: Jonathon Pepe DO  Direct patient critical care time: 25 minutes  Ordering / reviewing critical care time: 5 minutes  Documentation critical care time: 5 minutes  Total critical care time (exclusive of procedural time) : 35 minutes  Critical care time was exclusive of separately billable procedures and treating other patients.  Critical care was necessary to treat or prevent imminent or life-threatening deterioration of the following conditions: respiratory failure and sepsis.  Critical care was time spent personally by me on the following activities: development of treatment plan with patient or surrogate, interpretation of cardiac output measurements, evaluation of patient's response to treatment, examination of patient, obtaining history from patient or surrogate, ordering and performing treatments and interventions, ordering and review of laboratory studies, ordering and review of radiographic studies, pulse oximetry, re-evaluation of patient's condition, review of old charts and transcutaneous pacing.        Labs Reviewed   COMPREHENSIVE METABOLIC PANEL - Abnormal       Result Value    Sodium  136      Potassium 4.2      Chloride 99      CO2 24      Glucose 98      BUN 16      Creatinine 0.7      Calcium 9.7      Protein Total 9.1 (*)     Albumin 3.1 (*)     Bilirubin Total 0.4      ALP 74      AST 19      ALT 16      Anion Gap 13      eGFR >60     MAGNESIUM - Abnormal    Magnesium  1.5 (*)    CBC WITH DIFFERENTIAL - Abnormal    WBC 14.22 (*)     RBC 4.21 (*)     HGB 12.8 (*)     HCT 37.5 (*)     MCV 89      MCH 30.4      MCHC 34.1      RDW 13.8      Platelet Count 494 (*)     MPV 9.0 (*)     Nucleated RBC 0      Neut % 68.0      Lymph % 10.5 (*)     Mono % 10.8      Eos % 9.3 (*)     Basophil % 0.6      Imm Grans % 0.8 (*)     Neut # 9.67 (*)     Lymph # 1.50      Mono # 1.53 (*)     Eos # 1.32 (*)     Baso # 0.09      Imm Grans # 0.11 (*)    LACTIC ACID, PLASMA - Normal    Lactic Acid Level 1.3      Narrative:     Falsely low lactic acid results can be found in samples containing >=13.0 mg/dL total bilirubin and/or >=3.5 mg/dL direct bilirubin.    B-TYPE NATRIURETIC PEPTIDE - Normal    BNP <10     TROPONIN I - Normal    Troponin-I <0.006     PROCALCITONIN - Normal    Procalcitonin 0.06     CULTURE, BLOOD   CULTURE, BLOOD   CBC W/ AUTO DIFFERENTIAL    Narrative:     The following orders were created for panel order CBC auto differential.  Procedure                               Abnormality         Status                     ---------                               -----------         ------                     CBC with Differential[[id number]]       Abnormal            Final result                 Please view results for these tests on the individual orders.     EKG Readings: (Independently Interpreted)   Sinus rhythm rate of 112, right axis deviation, right bundle-branch block, no ST-T wave changes     ECG Results              EKG 12-lead (In process)        Collection Time Result Time QRS Duration OHS QTC Calculation    06/04/25 14:11:44 06/04/25 14:27:59 100 464                     In process by  Interface, Lab In [facility] (06/04/25 14:28:07)                   Narrative:    Test Reason : Z13.6,    Vent. Rate : 112 BPM     Atrial Rate : 112 BPM     P-R Int : 166 ms          QRS Dur : 100 ms      QT Int : 340 ms       P-R-T Axes :  65  65  63 degrees    QTcB Int : 464 ms    Sinus tachycardia  Incomplete right bundle branch block  Borderline Abnormal ECG  When compared with ECG of 04-May-2025 13:15,  No significant change was found    Referred By: AAAREFERRAL SELF           Confirmed By:                                   Imaging Results              X-Ray Chest AP Portable (Final result)  Result time 06/04/25 15:00:05      Final result by Charlie English MD (06/04/25 15:00:05)                   Impression:      As above.      Electronically signed by: Charlie English  Date:    06/04/2025  Time:    15:00               Narrative:    EXAMINATION:  XR CHEST AP PORTABLE    CLINICAL HISTORY:  Sepsis;    TECHNIQUE:  Single frontal view of the chest was performed.    COMPARISON:  None    FINDINGS:  Significant worsening of pulmonary opacities in comparison to the prior exam.  Superimposed pneumonia or aspiration not excluded.  No pleural fluid or pneumothorax.    The cardiac silhouette is normal in size. The hilar and mediastinal contours are unremarkable.    Bones are intact.                                       Medications   sodium chloride 0.9% bolus 1,593 mL 1,593 mL (0 mLs Intravenous Stopped 6/4/25 1541)   ceFEPIme injection 2 g (2 g Intravenous Given 6/4/25 1453)   albuterol-ipratropium 2.5 mg-0.5 mg/3 mL nebulizer solution 3 mL (3 mLs Nebulization Given 6/4/25 1456)   methylPREDNISolone sodium succinate injection 125 mg (125 mg Intravenous Given 6/4/25 1451)   magnesium sulfate in dextrose IVPB (premix) 1 g (0 g Intravenous Stopped 6/4/25 1655)     Medical Decision Making  On final re-evaluation the patient is sleeping and feeling much better.  He is able to tolerate fluids at the time of discharge.  Will  place on antibiotics and steroids and he will follow back up with his pulmonologist.  He is instructed to return to any emergency department immediately for any new or worsening symptoms and he verbalized understanding.    Amount and/or Complexity of Data Reviewed  Labs: ordered. Decision-making details documented in ED Course.  Radiology: ordered. Decision-making details documented in ED Course.    Risk  Prescription drug management.  Risk Details: Differential diagnosis includes but is not limited to:  ACS, heart failure, pneumonia, exacerbation of interstitial lung disease, COPD exacerbation               ED Course as of 06/05/25 0614   Wed Jun 04, 2025   1527 X-Ray Chest AP Portable  Significant worsening of pulmonary opacities in comparison to the prior exam.  Superimposed pneumonia or aspiration not excluded.  No pleural fluid or pneumothorax.     The cardiac silhouette is normal in size. The hilar and mediastinal contours are unremarkable.   [CD]   1644 Procalcitonin  Thin normal limits [CD]   1645 Magnesium(!)  Hypomagnesemia, we will give 1 g of magnesium sulfate [CD]   1645 CBC auto differential(!)  Leukocytosis and other nonspecific findings [CD]   1645 Comprehensive metabolic panel(!)  Nonspecific findings [CD]   1645 Troponin I  Within normal limits [CD]   1645 Blood culture x two cultures. Draw prior to antibiotics.  Pending [CD]   1645 Brain natriuretic peptide  Within normal limits [CD]   1645 Lactic acid, plasma #1  Within normal limits [CD]      ED Course User Index  [CD] Jonathon Pepe DO                           Clinical Impression:  Final diagnoses:  [Z13.6] Screening for cardiovascular condition  [J84.10] Pulmonary fibrosis  [J44.1] COPD exacerbation (Primary)          ED Disposition Condition    Discharge Stable          ED Prescriptions       Medication Sig Dispense Start Date End Date Auth. Provider    levoFLOXacin (LEVAQUIN) 750 MG tablet Take 1 tablet (750 mg total) by mouth once  daily. for 7 days 7 tablet 6/5/2025 6/12/2025 Jonathon Pepe DO    predniSONE (DELTASONE) 20 MG tablet Take 2 tablets (40 mg total) by mouth once daily. for 5 days 10 tablet 6/5/2025 6/10/2025 Jonathon Pepe DO          Follow-up Information       Follow up With Specialties Details Why Contact Info    Kun Abdul MD Pulmonary Disease Schedule an appointment as soon as possible for a visit   [address] [place] [address] [address] [place] [id number]  [phone]                     [1]   Social History  Tobacco Use    Smoking status: Former     Current packs/day: 1.00     Types: Cigarettes    Smokeless tobacco: Never   Substance Use Topics    Alcohol use: Not Currently     Comment: occassionally     Drug use: No        Jonathon Pepe DO  06/05/25 0617

## 2025-06-05 ENCOUNTER — TELEPHONE (OUTPATIENT)
Dept: TRANSPLANT | Facility: CLINIC | Age: 60
End: 2025-06-05
Payer: MEDICARE

## 2025-06-05 LAB
OHS QRS DURATION: 100 MS
OHS QTC CALCULATION: 464 MS

## 2025-06-05 NOTE — TELEPHONE ENCOUNTER
"6/5/25 - Message and clinicals sent to [facility] for financial clearance for consult with PFTs, 6 minute walk test, and TTE.    ----- Message from Vanessa Weller DO sent at 6/5/2025 12:39 PM CDT -----    Regarding: RE: Lung Transplant Referral    Appropriate for LUT.  PFTs, 6MWT, TTE, and CD with CT images if not available to us.  Thanks    ----- Message -----  From: Eva Mccall RN  Sent: 6/5/2025  12:16 PM CDT  To: Vanessa Weller DO  Subject: Lung Transplant Referral                         Lung Transplant Referral Note    Referral from: Kun Abdul MD    Lung diagnosis: CPFE     Age: 59 y.o.    Height/Weight/BMI: HT: 5'8" / WT: 53.1 kg / Body mass index is 17.79 kg/m².    Smoking history: Social History    Tobacco Use    Smoking status: Former        Packs/day: 1.00      Types: Cigarettes      Smokeless tobacco: Never  Per recent MD note: He lives alone and reports mostly quitting smoking since surgery with occasional drags.       PFT date: 04/03/2025  FVC         1.68 / 38.4  FEV1       1.54 / 45.3  FEV1/FVC     92  TLC         4.48 / 66.5  DLCO      7.96 / 28.7    6 MWT date: 03/04/2025  Distance: 129.54 meters.   Walked for 221 seconds. Stopped x 1  Desaturated to 88% on RA during exertion; oxygen @ 2L placed - no further desaturations    ABG date: 06/04/2025  pH 7.43, pCO2  41.8, pO2  65, HCO3  27.9, BE 4, O2 sat 93% on RA    CXR date: 06/04/2025  Impression: Significant worsening of pulmonary opacities in comparison to the prior exam.  Superimposed pneumonia or aspiration not excluded.  No pleural fluid or pneumothorax.    Chest CTA date: 05/04/2025  Impression: No evidence of acute PE.  Severe high-grade chronic interstitial pulmonary fibrosis with bronchiectasis and honeycombing.    Echo date:   N/A  Impression:    Other pertinent medical history: spontaneous pneumothorax - s/p pleurodesis with VATs 2/21/25; ADD; Anxiety; back pain; back and neck surgery; depression; neuromuscular " disorder?    Recommendations?

## 2025-06-06 PROBLEM — J84.10 PULMONARY FIBROSIS: Status: ACTIVE | Noted: 2025-06-06

## 2025-06-10 LAB
BACTERIA BLD CULT: NORMAL
BACTERIA BLD CULT: NORMAL

## 2025-06-12 ENCOUNTER — OFFICE VISIT (OUTPATIENT)
Dept: PALLIATIVE MEDICINE | Facility: CLINIC | Age: 60
End: 2025-06-12
Payer: MEDICARE

## 2025-06-12 VITALS
HEART RATE: 116 BPM | OXYGEN SATURATION: 94 % | RESPIRATION RATE: 20 BRPM | BODY MASS INDEX: 17.61 KG/M2 | SYSTOLIC BLOOD PRESSURE: 112 MMHG | HEIGHT: 68 IN | TEMPERATURE: 98 F | WEIGHT: 116.19 LBS | DIASTOLIC BLOOD PRESSURE: 67 MMHG

## 2025-06-12 DIAGNOSIS — F41.9 ANXIETY: ICD-10-CM

## 2025-06-12 DIAGNOSIS — J96.11 CHRONIC RESPIRATORY FAILURE WITH HYPOXIA, ON HOME O2 THERAPY: ICD-10-CM

## 2025-06-12 DIAGNOSIS — Z51.5 ENCOUNTER FOR PALLIATIVE CARE: Primary | ICD-10-CM

## 2025-06-12 DIAGNOSIS — J44.9 COPD, SEVERE: ICD-10-CM

## 2025-06-12 DIAGNOSIS — Z99.81 CHRONIC RESPIRATORY FAILURE WITH HYPOXIA, ON HOME O2 THERAPY: ICD-10-CM

## 2025-06-12 DIAGNOSIS — J84.10 PULMONARY FIBROSIS: ICD-10-CM

## 2025-06-12 DIAGNOSIS — R06.09 DOE (DYSPNEA ON EXERTION): ICD-10-CM

## 2025-06-12 DIAGNOSIS — K21.9 GASTROESOPHAGEAL REFLUX DISEASE, UNSPECIFIED WHETHER ESOPHAGITIS PRESENT: ICD-10-CM

## 2025-06-12 PROCEDURE — 99999 PR PBB SHADOW E&M-EST. PATIENT-LVL IV: CPT | Mod: PBBFAC,TXP,, | Performed by: NURSE PRACTITIONER

## 2025-06-12 RX ORDER — OXYCODONE HYDROCHLORIDE 10 MG/1
10 TABLET ORAL EVERY 4 HOURS PRN
Qty: 28 TABLET | Refills: 0 | Status: SHIPPED | OUTPATIENT
Start: 2025-06-12 | End: 2025-06-19

## 2025-06-12 RX ORDER — PANTOPRAZOLE SODIUM 40 MG/1
40 TABLET, DELAYED RELEASE ORAL DAILY
Qty: 90 TABLET | Refills: 3 | Status: SHIPPED | OUTPATIENT
Start: 2025-06-12 | End: 2026-06-12

## 2025-06-12 RX ORDER — DEXAMETHASONE 4 MG/1
TABLET ORAL
Qty: 50 TABLET | Refills: 0 | Status: SHIPPED | OUTPATIENT
Start: 2025-06-12 | End: 2025-07-22

## 2025-06-12 RX ORDER — OXYCODONE AND ACETAMINOPHEN 10; 325 MG/1; MG/1
1 TABLET ORAL EVERY 4 HOURS PRN
Refills: 0 | Status: CANCELLED | OUTPATIENT
Start: 2025-06-12

## 2025-06-12 NOTE — ASSESSMENT & PLAN NOTE
Severe.  We discussed benefit vs risks of multiple mgmt strategies and agreed to trial of dexamethasone + oxycodone PRN.    Encouraged liberal O2 use.   Encouraged immediate smoking cessation, offered support.   We discussed concerns of opioid rx + steroid if he is to be considered for transplant. He would like to proceed with plan.

## 2025-06-12 NOTE — PATIENT INSTRUCTIONS
"Measures that may help relieve shortness of breath:  Conserve your energy - prioritize activities  Relieve anxiety - Develop and practice a "ritual" to decrease fears and sense of panic and to increase sense of control.  Try approaches that have worked before, eg, oxygen, inhaler or nebulizer treatment.  Change positions. Find a position that helps ease breathing, eg, elevate the head of the bed, use extra pillows, or sit upright or in a "tripod position."  Breathing techniques (pursed-lips breathing, diaphragmatic/slow and purposeful breathing)   Fan blowing cool air on face      What should I know about my pain medicines?   It's important to take long-acting opioids as prescribed, even if you aren't in pain. The goal of treatment is to keep your pain under control all of the time, and not just treat it when it happens. If you aren't in pain, it means that the pain medicines are working.    Let your doctor or nurse know if your medicine isn't helping enough with your pain, or if the effect isn't lasting long enough between doses. Your doctor can increase your dose, increase how often you take your medicine, or prescribe a different medicine.    Opioids can have side effects. Common side effects include constipation, sleepiness, and nausea. Let your doctor or nurse know if you have any side effects. That way, they can treat your side effects or change your medicine.    Taking opioids for a long time might increase your risk of getting certain infections. Your doctor can talk to you about your risk and whether there are things you can do to help prevent infection.    Store your opioids in a safe place, such as a locked cabinet. This prevents children, teens, or anyone else from getting to them.    People often worry that they are getting addicted to their medicine when they need higher and higher doses to treat their pain. This can be a concern for people who use opioids inappropriately. But it is not the case for " "people who need treatment for cancer-related pain. Your body might need higher doses because your condition is getting worse, or because you have a higher "tolerance" for the medicine. Tolerance is when a body gets used to a medicine, so the medicine doesn't work as well as it used to. Tolerance is not the same as addiction.       Measures to improve Opioid Induced Constipation:  Lifestyle Changes  Measures to prevent constipation include:  eating an adequate fiber in the diet  drinking ample water  exercising to encourage motility of the bowels  limiting intake of other painkillers  using a laxative.     Dietary  There are many fiber-rich foods that one can eat to treat constipation.  Fruits like apples, bananas, prunes, pears, raspberries, and vegetables like string beans, broccoli, spinach, kale, squash, lentils, peas, and beans are often recommended.   One can also eat almost any type of bran products (usually cereals) and nuts.   When eating foods with fiber, it is important not to consume more than 25 to 30 grams per day otherwise it can lead to a bloating sensation.     Laxative choices include polyethylene glycol (Miralax), senna (Sennakot), bisacodyl (Dulcolax) and milk of magnesia. This is my order of preference.       "

## 2025-06-12 NOTE — ASSESSMENT & PLAN NOTE
Impression:  Symptoms:  GOMEZ  Medicolegal: Mr Pang has decision making capacity.  He has no surrogate decision maker or HCPOA. Written information on ACP, HC POA provided. Mr. Pang would like to review the information and follow up next office visit.   Psychosocial:  support system consists of sister, friend  Understanding of disease and Illness Trajectory: Patient and Family  has  adequate understanding of his illness, they can benefit from continued education on what to expect in the future.  Goals of care:  Hopeful to receive lung transplant but would like relief of sx prior to this.     Summary and recommendations: Rx oxycodone and dexamethasone PRN.     Will f/u virtually in one week to assess effectiveness.

## 2025-06-12 NOTE — ASSESSMENT & PLAN NOTE
R/T pulmonary fibrosis + COPD.   Awaiting appt with transplant team. He will need to have stopped smoking 6 months prior for consideration of transplant.   Continues home oxygen, OFEV  Discussed energy conservation measures.   Has w/c for home use.   Consider addition of opioid PRN for severe sx; he will need to stop for consideration for transplant.

## 2025-06-12 NOTE — ASSESSMENT & PLAN NOTE
Severe with expected anger and agitation about his illness.  May benefit from counseling and PRN Xanax.
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
## 2025-06-13 ENCOUNTER — TELEPHONE (OUTPATIENT)
Dept: TRANSPLANT | Facility: CLINIC | Age: 60
End: 2025-06-13
Payer: MEDICARE

## 2025-06-13 DIAGNOSIS — J43.9 COMBINED PULMONARY FIBROSIS AND EMPHYSEMA (CPFE): Primary | ICD-10-CM

## 2025-06-13 DIAGNOSIS — J84.10 COMBINED PULMONARY FIBROSIS AND EMPHYSEMA (CPFE): Primary | ICD-10-CM

## 2025-06-13 DIAGNOSIS — Z76.82 LUNG TRANSPLANT CANDIDATE: ICD-10-CM

## 2025-06-13 NOTE — TELEPHONE ENCOUNTER
"Call returned to patient, received financial clearance for lung transplant referral, scheduling. Patient is interested and states he not able to walk. Patient with noted difficulty speaking. Patient reports this is "how it is since January". He is agreeable to next available appointment. VV for initial visit per Dr. Weller acceptable, today at 3pm. Returned call to patient to notify of visit that is scheduled. Patient again tearful, advised patient he consider calling 911 or going to emergency department due to audible distress patient is having (gasping) to breathe. He stated he has gone to the hospital but they don't listen and it does not help. He did state he will log on to the 3pm visit with Dr. Weller. Notified primary coordinator.     Copied from CRM #[id number]. Topic: Appointments - Appointment Access  >> Jun 13, 2025  5:34 PM Aleja wrote:  :  Appointment Request    Name of Caller:Octavio A     When is the first available appointment?No access    Symptoms:esta care     Would the patient rather a call back or a response   via [facility]? Call back     Best Call Back Number:[phone] (M)      Additional Information: Pt missed a call from the dept and is calling back to get scheduled for the next soonest appointment.Pt states to please call back with the next soonest available.  "

## 2025-06-13 NOTE — TELEPHONE ENCOUNTER
Attempted to reach patient by phone x2 regarding lung transplant referral. No answer. Voicemail box ful regarding lung transplant referral. Received insurance auth/financial clearance today. Notified primary coordinator.

## 2025-06-16 ENCOUNTER — OFFICE VISIT (OUTPATIENT)
Dept: TRANSPLANT | Facility: CLINIC | Age: 60
End: 2025-06-16
Payer: MEDICARE

## 2025-06-16 ENCOUNTER — TELEPHONE (OUTPATIENT)
Dept: TRANSPLANT | Facility: CLINIC | Age: 60
End: 2025-06-16

## 2025-06-16 DIAGNOSIS — J44.9 COPD, SEVERE: ICD-10-CM

## 2025-06-16 PROCEDURE — 99499 UNLISTED E&M SERVICE: CPT | Mod: 95,,, | Performed by: INTERNAL MEDICINE

## 2025-06-16 NOTE — LETTER
June 20, 2025                      [facility] 1st Fl  [address]  [facility] [address]  Phone: [phone]   Patient: Octavio Pang   MR Number: [id number]   YOB: 1965   Date of Visit: 6/16/2025       Dear       Thank you for referring Octavio Pang to me for evaluation. Attached you will find relevant portions of my assessment and plan of care.    If you have questions, please do not hesitate to call me. I look forward to following Octavio Pang along with you.    Sincerely,    Vanessa Weller, DO    Enclosure    If you would like to receive this communication electronically, please contact [email or web address] or [phone] to request [employer] access.    [employer] is a tool which provides read-only access to select patient information with whom you have a relationship. Its easy to use and provides real time access to review your patients record including encounter summaries, notes, results, and demographic information.    If you feel you have received this communication in error or would no longer like to receive these types of communications, please e-mail [email or web address]

## 2025-06-17 ENCOUNTER — OFFICE VISIT (OUTPATIENT)
Dept: TRANSPLANT | Facility: CLINIC | Age: 60
End: 2025-06-17
Payer: MEDICARE

## 2025-06-17 DIAGNOSIS — Z76.82 LUNG TRANSPLANT CANDIDATE: Primary | ICD-10-CM

## 2025-06-17 DIAGNOSIS — J96.11 CHRONIC RESPIRATORY FAILURE WITH HYPOXIA, ON HOME O2 THERAPY: ICD-10-CM

## 2025-06-17 DIAGNOSIS — J84.10 PULMONARY FIBROSIS: ICD-10-CM

## 2025-06-17 DIAGNOSIS — J44.9 COPD, SEVERE: ICD-10-CM

## 2025-06-17 DIAGNOSIS — Z99.81 CHRONIC RESPIRATORY FAILURE WITH HYPOXIA, ON HOME O2 THERAPY: ICD-10-CM

## 2025-06-17 DIAGNOSIS — J93.83 SPONTANEOUS PNEUMOTHORAX: ICD-10-CM

## 2025-06-17 PROCEDURE — 1160F RVW MEDS BY RX/DR IN RCRD: CPT | Mod: CPTII,95,, | Performed by: INTERNAL MEDICINE

## 2025-06-17 PROCEDURE — 98002 SYNCH AUDIO-VIDEO NEW MOD 45: CPT | Mod: 95,,, | Performed by: INTERNAL MEDICINE

## 2025-06-17 PROCEDURE — 1159F MED LIST DOCD IN RCRD: CPT | Mod: CPTII,95,, | Performed by: INTERNAL MEDICINE

## 2025-06-17 NOTE — PROGRESS NOTES
LUNG TRANSPLANT INITIAL EVALUATION         The patient location is: Home ([place])  The chief complaint leading to consultation is: pre lung transplant     Visit type: audiovisual     Face to Face time with patient: 30 minutes of total time spent on the encounter, which includes face to face time and non-face to face time preparing to see the patient (eg, review of tests), Obtaining and/or reviewing separately obtained history, Documenting clinical information in the electronic or other health record, Independently interpreting results (not separately reported) and communicating results to the patient/family/caregiver, or Care coordination (not separately reported).      Each patient to whom he or she provides medical services by telemedicine is:  (1) informed of the relationship between the physician and patient and the respective role of any other health care provider with respect to management of the patient; and (2) notified that he or she may decline to receive medical services by telemedicine and may withdraw from such care at any time.                                                                                                                                          Reason for Visit:  Evaluation for lung transplant; combined emphysema and pulmonary fibrosis    Referring Physician: Kun Abdul MD    History of Present Illness: Octavio Pang is a 59 y.o. male who is on 3L of oxygen.  He is on no assisted ventilation.  His New York Heart Association Class is IV and a Karnofsky score of 40% - Disabled: Requires special care and assistance. He is not diabetic.    Requires Supplemental O2: Yes.  With exercise: Nasal cannula - 3 L/min.    Massive Hemoptysis: 0 occurrences  (Enter the number of times in the last year)    Exacerbations: 1 occurrences  (Enter the number of times in the last year)    Microbiology Infections: No    Patient presents today via virtual visit for lung transplant consideration.   He has a diagnosis of COPD and pulmonary fibrosis.  States that up until 2025, he was able to maintain an active lifestyle and had dyspnea only with heavy exertion.  In , he noticed worsening dyspnea and was very dyspneic with minimal activity.  He sought care and had a CXR which he reports showed bilateral pneumothoraces.  He was sent to the ED.  He initially had CT placement which did not fully expand his lung.  He underwent VATs with left sided manual and doxy pleurodesis.  His lung failed to fully expand but did improve.  He was sent home with a heimlich valve and atrium.  CT was eventually removed.      Following removal of the CT, he states that his dyspnea has continued to worsen.  He is on RA at rest and 2-3L with any exertion.  He is bedbound with extreme dyspnea with any ambulation.  Is dependent with regards to ADLs.  He has significant anxiety as well.  Has panic attacks when he ambulates as he feels like to cannot catch his breath and is going to pass out.  He uses his inhaler therapy.  Is on prednisone and oxycodone per palliative.  He was prescribed OFEV and just got the prescription; hasn't yet taken any.  Is too dyspneic for pulmonary rehab.    He is a previous smoker and quit in 2025 at the time of his VATs.  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  Does not report consistent respiratory protection.  His mother  at the age of 76 from pulmonary fibrosis.      Past Medical History:   Diagnosis Date    ADD (attention deficit disorder)     Anxiety     Back pain     COPD (chronic obstructive pulmonary disease)     Depression     Emphysema, unspecified     Gout     Interstitial lung disease     Neuromuscular disorder     Pneumothorax, unspecified        Past Surgical History:   Procedure Laterality Date    BACK SURGERY  10/13/2016    INJECTION OF ANESTHETIC AGENT AROUND MULTIPLE INTERCOSTAL NERVES Right 2025    Procedure: BLOCK,  NERVE, INTERCOSTAL, 2 OR MORE;  Surgeon: Tim Moss MD;  Location: [facility] OR;  Service: Cardiothoracic;  Laterality: Right;  MULTIPLE INTERCOSTAL NERVE BLOCKS    NECK SURGERY      PLEURODESIS WITH VIDEO-ASSISTED THORACOSCOPIC SURGERY (VATS) Right 2/21/2025    Procedure: VATS, WITH PLEURODESIS;  Surgeon: Tim Moss MD;  Location: [facility] OR;  Service: Cardiothoracic;  Laterality: Right;  CHEMICAL AND MECHANICAL PLEURODESIS       Allergies: Ancef [cefazolin]    Current Outpatient Medications   Medication Sig    albuterol (PROVENTIL/VENTOLIN HFA) 90 mcg/actuation inhaler Inhale 2 puffs into the lungs every 6 (six) hours as needed.    albuterol-ipratropium (DUO-NEB) 2.5 mg-0.5 mg/3 mL nebulizer solution Take 3 mLs by nebulization every 6 (six) hours as needed for Wheezing or Shortness of Breath. Rescue    ALPRAZolam (XANAX) 0.25 MG tablet Take 0.25 mg by mouth.    alprazolam (XANAX) 1 MG tablet Take 2 mg by mouth nightly as needed for Anxiety.     busPIRone (BUSPAR) 7.5 MG tablet Take 7.5 mg by mouth 3 (three) times daily.    cyanocobalamin (VITAMIN B-12) 1000 MCG tablet Take 1 tablet by mouth every morning.    dexAMETHasone (DECADRON) 4 MG Tab Take 1 tablet (4 mg total) by mouth every 12 (twelve) hours for 10 days, THEN 1 tablet (4 mg total) once daily.    fluticasone-salmeterol diskus inhaler 500-50 mcg Inhale 1 puff into the lungs 2 (two) times daily. Controller    gabapentin (NEURONTIN) 100 MG capsule Take 100 mg by mouth as needed.    levoFLOXacin (LEVAQUIN) 500 MG tablet Take 1 tablet (500 mg total) by mouth once daily.    lisdexamfetamine (VYVANSE) 30 MG capsule Take 30 mg by mouth every morning.    methocarbamol (ROBAXIN) 500 MG Tab Take 500 mg by mouth.    methylphenidate HCl (METADATE ER) 10 MG ER tablet Take 10 mg by mouth.    naproxen (NAPROSYN) 500 MG tablet Take 1 tablet (500 mg total) by mouth 2 (two) times daily with meals.    nintedanib (OFEV) 150 mg Cap Take 1 capsule (150 mg total) by mouth  2 (two) times a day.    oxyCODONE (ROXICODONE) 10 mg Tab immediate release tablet Take 1 tablet (10 mg total) by mouth every 4 (four) hours as needed (dyspnea)    pantoprazole (PROTONIX) 40 MG tablet Take 1 tablet (40 mg total) by mouth once daily.     No current facility-administered medications for this visit.       Immunization History   Administered Date(s) Administered    Influenza - Quadrivalent - PF *Preferred* (6 months and older) 10/15/2016    Pneumococcal Conjugate - 20 Valent 06/04/2025     Family History:    Family History   Problem Relation Name Age of Onset    Arthritis Father      Pulmonary fibrosis Mother       Social History     Substance and Sexual Activity   Alcohol Use Not Currently    Comment: occassionally       Social History     Substance and Sexual Activity   Drug Use No    Social History[1]  Review of Systems   Constitutional:  Positive for malaise/fatigue and weight loss. Negative for chills, diaphoresis and fever.   HENT:  Negative for congestion, ear discharge, ear pain, hearing loss, nosebleeds, sinus pain, sore throat and tinnitus.    Eyes:  Negative for blurred vision, double vision, photophobia, pain, discharge and redness.   Respiratory:  Positive for cough, sputum production and shortness of breath. Negative for hemoptysis, wheezing and stridor.    Cardiovascular:  Positive for chest pain. Negative for palpitations, orthopnea, claudication, leg swelling and PND.   Gastrointestinal:  Negative for abdominal pain, blood in stool, constipation, diarrhea, heartburn, melena, nausea and vomiting.   Genitourinary:  Negative for dysuria, flank pain, frequency, hematuria and urgency.   Musculoskeletal:  Negative for back pain, falls, joint pain, myalgias and neck pain.   Skin:  Negative for itching and rash.   Neurological:  Negative for dizziness, tingling, tremors, sensory change, speech change, focal weakness, seizures, loss of consciousness, weakness and headaches.   Endo/Heme/Allergies:   Negative for environmental allergies and polydipsia. Does not bruise/bleed easily.   Psychiatric/Behavioral:  Positive for depression. Negative for hallucinations, memory loss, substance abuse and suicidal ideas. The patient is nervous/anxious. The patient does not have insomnia.      Vitals  There were no vitals taken for this visit.  Physical Exam    Labs:  No visits with results within 7 Day(s) from this visit.   Latest known visit with results is:   Admission on 06/04/2025, Discharged on 06/04/2025   Component Date Value    Blood Culture 06/04/2025 No Growth After 5 Days     Blood Culture 06/04/2025 No Growth After 5 Days     Sodium 06/04/2025 136     Potassium 06/04/2025 4.2     Chloride 06/04/2025 99     CO2 06/04/2025 24     Glucose 06/04/2025 98     BUN 06/04/2025 16     Creatinine 06/04/2025 0.7     Calcium 06/04/2025 9.7     Protein Total 06/04/2025 9.1 (H)     Albumin 06/04/2025 3.1 (L)     Bilirubin Total 06/04/2025 0.4     ALP 06/04/2025 74     AST 06/04/2025 19     ALT 06/04/2025 16     Anion Gap 06/04/2025 13     eGFR 06/04/2025 >60     Lactic Acid Level 06/04/2025 1.3     QRS Duration 06/04/2025 100     [facility] QTC Calculation 06/04/2025 464     Magnesium  06/04/2025 1.5 (L)     BNP 06/04/2025 <10     Troponin-I 06/04/2025 <0.006     Procalcitonin 06/04/2025 0.06     WBC 06/04/2025 14.22 (H)     RBC 06/04/2025 4.21 (L)     HGB 06/04/2025 12.8 (L)     HCT 06/04/2025 37.5 (L)     MCV 06/04/2025 89     MCH 06/04/2025 30.4     MCHC 06/04/2025 34.1     RDW 06/04/2025 13.8     Platelet Count 06/04/2025 494 (H)     MPV 06/04/2025 9.0 (L)     Nucleated RBC 06/04/2025 0     Neut % 06/04/2025 68.0     Lymph % 06/04/2025 10.5 (L)     Mono % 06/04/2025 10.8     Eos % 06/04/2025 9.3 (H)     Basophil % 06/04/2025 0.6     Imm Grans % 06/04/2025 0.8 (H)     Neut # 06/04/2025 9.67 (H)     Lymph # 06/04/2025 1.50     Mono # 06/04/2025 1.53 (H)     Eos # 06/04/2025 1.32 (H)     Baso # 06/04/2025 0.09     Imm Grans #  06/04/2025 0.11 (H)             No data to display                  3/24/2025     3:58 PM   6MW   6MWT Status completed with stops   Patient Reported Dyspnea;Lightheadedness;Chest pain/tightness   Was O2 used? Yes   Delivery Method Cannula;Pull Tank   6MW Distance walked (feet) 425 feet   Distance walked (meters) 129.54 meters   Did patient stop? Yes   Oxygen Saturation 93 %   Supplemental Oxygen Room Air   Heart Rate 119 bpm   Blood Pressure 119/79   Shea Dyspnea Rating  very heavy   Oxygen Saturation 98 %   Supplemental Oxygen 2 L/M   Heart Rate 120 bpm   Blood Pressure 109/70   Shea Dyspnea Rating  very,very heavy   Recovery Time (seconds) 240 seconds   Oxygen Saturation 98 %   Supplemental Oxygen 2 L/M   Heart Rate 112 bpm       Imaging:  Results for orders placed during the hospital encounter of 03/13/25    X-Ray Chest PA And Lateral    Narrative  EXAM: XR CHEST PA AND LATERAL    CLINICAL HISTORY:  Pneumothorax    TECHNIQUE: 2 views of the chest.    COMPARISON: 03/05/2025 chest x-ray    FINDINGS:  Right chest tube with tip near the right lung apex is unchanged.  Stable small right apical pneumothorax.    Chronic interstitial scarring/fibrosis stable.  Normal heart size.    Old rib fractures.    Impression  Stable chest x-ray.    Finalized on: 3/13/2025 3:29 PM By:  Tenzin Brasher MD  [facility]# [id number]      2025-03-13 15:31:12.746     [facility]    No results found for this or any previous visit.    5/4/2025 EXAM:    CTA CHEST NON CORONARY (PE STUDIES)     CLINICAL HISTORY:    Hemoptysis     TECHNIQUE: Standard contrast-enhanced CTA of the chest performed with 100 mL Omnipaque 350 utilizing 3-D MIP reformats.  All CT scans at [this location] are performed using dose modulation techniques as appropriate to a performed exam including the following: automated exposure control; adjustment of the mA and/or kV according to patient size (this includes techniques or standardized protocols for targeted exams where dose is  matched to indication / reason for exam; i.e. extremities or head); use of iterative reconstruction technique.     FINDINGS:  Comparison with CT chest with contrast 02/18/2025     The cardiac size is nonenlarged.  High-grade multivessel coronary artery calcification is seen.     The pulmonary arteries are normal in enhancement pattern.  No filling defects.     There is severe interstitial lung disease consistent with interstitial pulmonary fibrosis.  Bronchiectasis is present as well with honeycombing in the lung bases.     Minimal left pleural effusion.     In the upper abdomen no acute findings.        Impression:   No evidence of acute PE.  Severe high-grade chronic interstitial pulmonary fibrosis with bronchiectasis and honeycombing.         Cardiodiagnostics:  None      Assessment:  1. Lung transplant candidate    2. Pulmonary fibrosis    3. COPD, severe    4. Chronic respiratory failure with hypoxia, on home O2 therapy    5. Spontaneous pneumothorax      Plan:   Patient followed by Dr. Abdul for combined COPD/pulmonary fibrosis.  End stage disease.  Worsened following a hospitalization earlier this year for PTX which required VATs with mechanical and doxy pleurodesis.  Continue with inhaler and nebulizer therapies.  Just received OFEV.  Following with palliative care.  Too deconditioned/dyspneic for pulmonary rehab.  Continue with oxygen as prescribed.  Currently on 3L with exertion.  Fine at rest.  Could not perform 6MWT or TTE.    Hx of spontaneous PTX.  Has clinically declined since VATs.  Lung not fully expanded but much improved from previous.  With regards to his lung transplant candidacy for a diagnosis of pulmonary fibrosis/emphysema, he clearly has end stage lung disease and meets disease specific indications for transplant.  However, he has barriers that prevent us from being able to offer transplant as an option.  First, he is severely debilitated and for the most part bed bound.  Would need to  be able to be ambulatory and complete 800 ft on 6MWT.  Would need to be able to participate in pulmonary rehab.  He would also need to maintain smoking cessation for at least 6 months and lifelong thereafter.  He quit in 2/2025.  Lastly, his previous mechanical pleurodesis with doxy along with his dense peripheral fibrosis, would put him at high surgical risk, although if he can meet the prior goals, we could consider single lung transplant.  Given his severe symptoms and debility, I believe it is unlikely that he will be able to progress to his goals.  Encouraged close palliative care follow up for symptom control.  Asked that if he has improvement and is able to reach the above stated goals, then please reach out to the clinic and we will schedule him for an inperson visit.  Follow up prn.    Thank you for the consult and allowing me to participate in the care of this patient.  Please feel free to reach out with any questions.       Vanessa Weller D.O.  Pulmonary/Critical Care and Lung Transplantation  [facility]         [1]   Social History  Socioeconomic History    Marital status: Single   Tobacco Use    Smoking status: Former     Current packs/day: 1.00     Types: Cigarettes    Smokeless tobacco: Never   Substance and Sexual Activity    Alcohol use: Not Currently     Comment: occassionally     Drug use: No    Sexual activity: Yes     Partners: Female     Social Drivers of Health     Financial Resource Strain: Low Risk  (6/17/2025)    Overall Financial Resource Strain (CARDIA)     Difficulty of Paying Living Expenses: Not hard at all   Food Insecurity: No Food Insecurity (6/17/2025)    Hunger Vital Sign     Worried About Running Out of Food in the Last Year: Never true     Ran Out of Food in the Last Year: Never true   Transportation Needs: No Transportation Needs (6/17/2025)    PRAPARE - Transportation     Lack of Transportation (Medical): No     Lack of Transportation  (Non-Medical): No   Physical Activity: Inactive (6/17/2025)    Exercise Vital Sign     Days of Exercise per Week: 0 days     Minutes of Exercise per Session: 0 min   Stress: Stress Concern Present (6/17/2025)    [employer] [place] [employer] - Occupational Stress Questionnaire     Feeling of Stress : Very much   Housing Stability: Low Risk  (6/17/2025)    Housing Stability Vital Sign     Unable to Pay for Housing in the Last Year: No     Number of Times Moved in the Last Year: 0     Homeless in the Last Year: No

## 2025-06-17 NOTE — LETTER
June 17, 2025                      [facility]  [address]  [facility] [address]  Phone: [phone]   Patient: Octavio Pang   MR Number: [id number]   YOB: 1965   Date of Visit: 6/17/2025       Dear       Thank you for referring Octavio Pang to me for evaluation. Attached you will find relevant portions of my assessment and plan of care.    If you have questions, please do not hesitate to call me. I look forward to following Octavio Pang along with you.    Sincerely,    Vanessa Weller, DO    Enclosure    If you would like to receive this communication electronically, please contact [email or web address] or [phone] to request [employer] Link access.    [employer] is a tool which provides read-only access to select patient information with whom you have a relationship. Its easy to use and provides real time access to review your patients record including encounter summaries, notes, results, and demographic information.    If you feel you have received this communication in error or would no longer like to receive these types of communications, please e-mail [email or web address]

## 2025-06-19 ENCOUNTER — OFFICE VISIT (OUTPATIENT)
Dept: PALLIATIVE MEDICINE | Facility: CLINIC | Age: 60
End: 2025-06-19
Payer: MEDICARE

## 2025-06-19 DIAGNOSIS — F41.9 ANXIETY: ICD-10-CM

## 2025-06-19 DIAGNOSIS — K12.30 STOMATITIS AND MUCOSITIS: ICD-10-CM

## 2025-06-19 DIAGNOSIS — Z51.5 ENCOUNTER FOR PALLIATIVE CARE: ICD-10-CM

## 2025-06-19 DIAGNOSIS — Z99.81 CHRONIC RESPIRATORY FAILURE WITH HYPOXIA, ON HOME O2 THERAPY: Primary | ICD-10-CM

## 2025-06-19 DIAGNOSIS — K12.1 STOMATITIS AND MUCOSITIS: ICD-10-CM

## 2025-06-19 DIAGNOSIS — J96.11 CHRONIC RESPIRATORY FAILURE WITH HYPOXIA, ON HOME O2 THERAPY: Primary | ICD-10-CM

## 2025-06-19 RX ORDER — OXYCODONE HYDROCHLORIDE 10 MG/1
10 TABLET ORAL EVERY 6 HOURS PRN
Qty: 120 TABLET | Refills: 0 | Status: SHIPPED | OUTPATIENT
Start: 2025-06-19 | End: 2025-07-19

## 2025-06-19 RX ORDER — NYSTATIN 100000 [USP'U]/ML
6 SUSPENSION ORAL 4 TIMES DAILY
Qty: 473 ML | Refills: 2 | Status: SHIPPED | OUTPATIENT
Start: 2025-06-19 | End: 2025-08-18

## 2025-06-19 RX ORDER — ALPRAZOLAM 0.5 MG/1
0.5 TABLET ORAL 2 TIMES DAILY PRN
Qty: 60 TABLET | Refills: 2 | Status: SHIPPED | OUTPATIENT
Start: 2025-06-19 | End: 2025-09-17

## 2025-06-19 NOTE — PROGRESS NOTES
Palliative Medicine Clinic Note     The patient location is:  Patient Home   The chief complaint leading to consultation is: f/u dyspnea, ACP      Visit type: Virtual visit with synchronous audio and video  Each patient to whom he or she provides medical services by telemedicine is:  (1) informed of the relationship between the physician and patient and the respective role of any other health care provider with respect to management of the patient; and (2) notified that he or she may decline to receive medical services by telemedicine and may withdraw from such care at any time.       ASSESSMENT/PLAN:      Plan/Recommendations:  1. Chronic respiratory failure with hypoxia, on home O2 therapy  Assessment & Plan:  R/T pulmonary fibrosis + COPD.   Reviewed note from transplant team. He will need to have stopped smoking 6 months prior for consideration of transplant.   Continues home oxygen, OFEV  Discussed energy conservation measures.   Has w/c for home use.    Orders:  -     oxyCODONE (ROXICODONE) 10 mg Tab immediate release tablet; Take 1 tablet (10 mg total) by mouth every 6 (six) hours as needed (dyspnea).  Dispense: 120 tablet; Refill: 0    2. Encounter for palliative care  Assessment & Plan:  Impression:  Symptoms:  GOMEZ  Medicolegal: Mr Pang has decision making capacity.  He has no surrogate decision maker or HCPOA. Written information on ACP, HC POA provided. Mr. Pang would like to review the information and follow up next office visit.   Psychosocial:  support system consists of sister, friend  Understanding of disease and Illness Trajectory: Patient and Family  has  adequate understanding of his illness, they can benefit from continued education on what to expect in the future.  Goals of care:  Hopeful to receive lung transplant but would like relief of sx prior to this.     Summary and recommendations: Rx oxycodone and dexamethasone PRN.     Will f/u virtually in one week to assess effectiveness.              Orders:  -     oxyCODONE (ROXICODONE) 10 mg Tab immediate release tablet; Take 1 tablet (10 mg total) by mouth every 6 (six) hours as needed (dyspnea).  Dispense: 120 tablet; Refill: 0    3. Stomatitis and mucositis  -     nystatin (MYCOSTATIN) 100,000 unit/mL suspension; Take 6 mLs (600,000 Units total) by mouth 4 (four) times daily. Use for 10 days. Repeat if symptoms persist.  Dispense: 473 mL; Refill: 2    4. Anxiety  -     ALPRAZolam (XANAX) 0.5 MG tablet; Take 1 tablet (0.5 mg total) by mouth 2 (two) times daily as needed for Anxiety.  Dispense: 60 tablet; Refill: 2        Advance Care Planning   Advance Directives:   Medical Power of : Yes    Agent's Name:  Sister Sol    Decision Making:  Patient answered questions  Goals of Care: What is most important right now is to focus on curative/life-prolongation (regardless of treatment burdens). Accordingly, we have decided that the best plan to meet the patient's goals includes continuing with treatment.           Thank you for involving me in the care of this patient.     Follow up in about 4 weeks (around 7/17/2025).    Future Appointments   Date Time Provider Department Center   9/3/2025 10:45 AM Kun Abdul MD [facility] [place]   11/6/2025 11:00 AM [facility] [place]        SUBJECTIVE:      History of Present Illness / Interval History:  Octavio Pang is 59 y.o. male with severe pulmonary fibrosis and COPD, with hypoxemia-related respiratory failure requiring home oxygen use. He has been referred to lung transplant team for consideration. Medical history also includes tobacco use, ADHD, anxiety and h/o bilateral pneumothoraces.  Presents to Palliative Care Clinic for physical symptoms, advance care planning,, and additional support..      Please see pulmonology note for more details.     6/17/25 Lung transplant  With regards to his lung transplant candidacy for a diagnosis of pulmonary  "fibrosis/emphysema, he clearly has end stage lung disease and meets disease specific indications for transplant. However, he has barriers that prevent us from being able to offer transplant as an option. First, he is severely debilitated and for the most part bed bound. Would need to be able to be ambulatory and complete 800 ft on 6MWT. Would need to be able to participate in pulmonary rehab. He would also need to maintain smoking cessation for at least 6 months and lifelong thereafter. He quit in 2/2025. Lastly, his previous mechanical pleurodesis with doxy along with his dense peripheral fibrosis, would put him at high surgical risk, although if he can meet the prior goals, we could consider single lung transplant. Given his severe symptoms and debility, I believe it is unlikely that he will be able to progress to his goals. Encouraged close palliative care follow up for symptom control. Asked that if he has improvement and is able to reach the above stated goals, then please reach out to the clinic and we will schedule him for an inperson visit.       Interval History  History obtained from: patient and medical record.    LOV prescribed decadron and oxycodone for dyspnea.   Some relief in dyspnea with above. Taking about 3 oxycodone/day.   Has thrush sx, severe.     States like his lungs are "too far gone" to get lung transplant. Would like to try pulmonary rehab but difficult to get to clinic. Still not able to walk far.     Dr Saldivar  is PCP. F/U appt in about one month    On Xanax 1 mg for many years then stopped. Then began 0.25mg PRN daily.   Coughs a lot of sputum at night.       Today we discussed symptom management, goals of care, and advanced care planning.        ROS:  Review of Systems    Palliative Exam    Medications:  Current Medications[1]    External  database queried on 06/19/2025  by Sheryl PHILLIPS :         Review of patient's allergies indicates:   Allergen Reactions    Ancef " [cefazolin] Other (See Comments)     Headaches and dizziness        OBJECTIVE:   Physical Exam:  Vitals:      Physical Exam  Constitutional:       General: He is not in acute distress.     Appearance: He is ill-appearing (cachectic).   Eyes:      General: No scleral icterus.  Pulmonary:      Effort: Pulmonary effort is normal.   Skin:     Coloration: Skin is not jaundiced or pale.   Neurological:      Mental Status: He is alert.   Psychiatric:         Behavior: Behavior normal.         Thought Content: Thought content normal.      Comments: Anxious at times           Labs:  Lab Results   Component Value Date    WBC 14.22 (H) 06/04/2025    HGB 12.8 (L) 06/04/2025    HCT 37.5 (L) 06/04/2025    MCV 89 06/04/2025     (H) 06/04/2025           Imaging:  None new     I spent a total of 50 minutes on the day of the visit. This includes face to face time in discussion of goals of care, symptom assessment, coordination of care and emotional support.  This also includes non-face to face time preparing to see the patient (eg, review of tests/imaging), obtaining and/or reviewing separately obtained history, documenting clinical information in the electronic or other health record, independently interpreting results and communicating results to the patient/family/caregiver, or care coordinator.     Additional 5 min time spent on a voluntary advance care planning and /or goals of care discussion, providing emotional support, formulating and communicating prognosis and exploring burden/benefit of various approaches of treatment.       Sheryl Coleman NP         [1]   Current Outpatient Medications:     albuterol (PROVENTIL/VENTOLIN HFA) 90 mcg/actuation inhaler, Inhale 2 puffs into the lungs every 6 (six) hours as needed., Disp: , Rfl:     albuterol-ipratropium (DUO-NEB) 2.5 mg-0.5 mg/3 mL nebulizer solution, Take 3 mLs by nebulization every 6 (six) hours as needed for Wheezing or Shortness of Breath. Rescue, Disp: 360 mL,  Rfl: 3    ALPRAZolam (XANAX) 0.5 MG tablet, Take 1 tablet (0.5 mg total) by mouth 2 (two) times daily as needed for Anxiety., Disp: 60 tablet, Rfl: 2    busPIRone (BUSPAR) 7.5 MG tablet, Take 7.5 mg by mouth 3 (three) times daily., Disp: , Rfl:     cyanocobalamin (VITAMIN B-12) 1000 MCG tablet, Take 1 tablet by mouth every morning., Disp: , Rfl:     dexAMETHasone (DECADRON) 4 MG Tab, Take 1 tablet (4 mg total) by mouth every 12 (twelve) hours for 10 days, THEN 1 tablet (4 mg total) once daily., Disp: 50 tablet, Rfl: 0    fluticasone-salmeterol diskus inhaler 500-50 mcg, Inhale 1 puff into the lungs 2 (two) times daily. Controller, Disp: 60 each, Rfl: 5    gabapentin (NEURONTIN) 100 MG capsule, Take 100 mg by mouth as needed., Disp: , Rfl:     levoFLOXacin (LEVAQUIN) 500 MG tablet, Take 1 tablet (500 mg total) by mouth once daily., Disp: 7 tablet, Rfl: 0    methocarbamol (ROBAXIN) 500 MG Tab, Take 500 mg by mouth., Disp: , Rfl:     nintedanib (OFEV) 150 mg Cap, Take 1 capsule (150 mg total) by mouth 2 (two) times a day., Disp: 60 capsule, Rfl: 11    nystatin (MYCOSTATIN) 100,000 unit/mL suspension, Take 6 mLs (600,000 Units total) by mouth 4 (four) times daily. Use for 10 days. Repeat if symptoms persist., Disp: 473 mL, Rfl: 2    oxyCODONE (ROXICODONE) 10 mg Tab immediate release tablet, Take 1 tablet (10 mg total) by mouth every 6 (six) hours as needed (dyspnea)., Disp: 120 tablet, Rfl: 0    pantoprazole (PROTONIX) 40 MG tablet, Take 1 tablet (40 mg total) by mouth once daily., Disp: 90 tablet, Rfl: 3

## 2025-06-19 NOTE — Clinical Note
Good afternoon - I've been seeing Octavio for palliative care. He is asking about pulmonary rehab. Would he benefit?

## 2025-06-19 NOTE — PATIENT INSTRUCTIONS
"Medical marijuana - [facility] - they have providers who can prescribe for you. They have a [employer] code!       Measures to improve Opioid Induced Constipation:  Lifestyle Changes  Measures to prevent constipation include:  eating an adequate fiber in the diet  drinking ample water  exercising to encourage motility of the bowels  limiting intake of other painkillers  using a laxative.     Dietary  There are many fiber-rich foods that one can eat to treat constipation.  Fruits like apples, bananas, prunes, pears, raspberries, and vegetables like string beans, broccoli, spinach, kale, squash, lentils, peas, and beans are often recommended.   One can also eat almost any type of bran products (usually cereals) and nuts.   When eating foods with fiber, it is important not to consume more than 25 to 30 grams per day otherwise it can lead to a bloating sensation.     Laxative choices include polyethylene glycol (Miralax), senna (Sennakot), bisacodyl (Dulcolax) and milk of magnesia. This is my order of preference.       Measures that may help relieve shortness of breath:  Conserve your energy - prioritize activities  Relieve anxiety - Develop and practice a "ritual" to decrease fears and sense of panic and to increase sense of control.  Try approaches that have worked before, eg, oxygen, inhaler or nebulizer treatment.  Change positions. Find a position that helps ease breathing, eg, elevate the head of the bed, use extra pillows, or sit upright or in a "tripod position."  Breathing techniques (pursed-lips breathing, diaphragmatic/slow and purposeful breathing)   Fan blowing cool air on face          "

## 2025-06-19 NOTE — ASSESSMENT & PLAN NOTE
R/T pulmonary fibrosis + COPD.   Reviewed note from transplant team. He will need to have stopped smoking 6 months prior for consideration of transplant.   Continues home oxygen, OFEV  Discussed energy conservation measures.   Has w/c for home use.

## 2025-06-20 ENCOUNTER — PATIENT MESSAGE (OUTPATIENT)
Dept: PULMONOLOGY | Facility: HOSPITAL | Age: 60
End: 2025-06-20
Payer: MEDICARE

## 2025-06-27 ENCOUNTER — TELEPHONE (OUTPATIENT)
Dept: PULMONOLOGY | Facility: CLINIC | Age: 60
End: 2025-06-27
Payer: MEDICARE

## 2025-06-27 NOTE — TELEPHONE ENCOUNTER
----- Message from Shalonda sent at 6/27/2025 12:28 PM CDT -----  Regarding: Re; Urgent Virtual Appointment  Contact: [phone]  Good afternoon,     Pt. Called, having a hard time breathing, trying to explain the complications and pain he is experiencing, and crying on the call.  Pt is requesting an Urgent call back. He said he needs to speak with Kun Abdul MD ASAP.  Please call Pt to assist.    Thank you,  Suzie LOVELACE  Access Navigator

## 2025-07-08 ENCOUNTER — TELEPHONE (OUTPATIENT)
Dept: PULMONOLOGY | Facility: CLINIC | Age: 60
End: 2025-07-08
Payer: MEDICARE

## 2025-07-14 ENCOUNTER — OFFICE VISIT (OUTPATIENT)
Dept: INTERNAL MEDICINE | Facility: CLINIC | Age: 60
End: 2025-07-14
Payer: MEDICARE

## 2025-07-14 VITALS
BODY MASS INDEX: 17.98 KG/M2 | HEIGHT: 68 IN | DIASTOLIC BLOOD PRESSURE: 60 MMHG | HEART RATE: 85 BPM | TEMPERATURE: 98 F | WEIGHT: 118.63 LBS | OXYGEN SATURATION: 95 % | SYSTOLIC BLOOD PRESSURE: 136 MMHG | RESPIRATION RATE: 17 BRPM

## 2025-07-14 DIAGNOSIS — K21.9 GASTROESOPHAGEAL REFLUX DISEASE, UNSPECIFIED WHETHER ESOPHAGITIS PRESENT: ICD-10-CM

## 2025-07-14 DIAGNOSIS — F41.9 ANXIETY: ICD-10-CM

## 2025-07-14 DIAGNOSIS — Z51.5 ENCOUNTER FOR PALLIATIVE CARE: ICD-10-CM

## 2025-07-14 DIAGNOSIS — J96.11 CHRONIC RESPIRATORY FAILURE WITH HYPOXIA, ON HOME O2 THERAPY: Primary | ICD-10-CM

## 2025-07-14 DIAGNOSIS — J84.10 PULMONARY FIBROSIS: ICD-10-CM

## 2025-07-14 DIAGNOSIS — Z99.81 CHRONIC RESPIRATORY FAILURE WITH HYPOXIA, ON HOME O2 THERAPY: Primary | ICD-10-CM

## 2025-07-14 DIAGNOSIS — F17.210 TOBACCO SMOKER, 1 PACK OF CIGARETTES OR LESS PER DAY: ICD-10-CM

## 2025-07-14 PROCEDURE — 99999 PR PBB SHADOW E&M-EST. PATIENT-LVL V: CPT | Mod: PBBFAC,,, | Performed by: STUDENT IN AN ORGANIZED HEALTH CARE EDUCATION/TRAINING PROGRAM

## 2025-07-14 PROCEDURE — 3008F BODY MASS INDEX DOCD: CPT | Mod: CPTII,S$GLB,, | Performed by: STUDENT IN AN ORGANIZED HEALTH CARE EDUCATION/TRAINING PROGRAM

## 2025-07-14 PROCEDURE — 99214 OFFICE O/P EST MOD 30 MIN: CPT | Mod: S$GLB,,, | Performed by: STUDENT IN AN ORGANIZED HEALTH CARE EDUCATION/TRAINING PROGRAM

## 2025-07-14 PROCEDURE — 1159F MED LIST DOCD IN RCRD: CPT | Mod: CPTII,S$GLB,, | Performed by: STUDENT IN AN ORGANIZED HEALTH CARE EDUCATION/TRAINING PROGRAM

## 2025-07-14 PROCEDURE — 3075F SYST BP GE 130 - 139MM HG: CPT | Mod: CPTII,S$GLB,, | Performed by: STUDENT IN AN ORGANIZED HEALTH CARE EDUCATION/TRAINING PROGRAM

## 2025-07-14 PROCEDURE — G2211 COMPLEX E/M VISIT ADD ON: HCPCS | Mod: S$GLB,,, | Performed by: STUDENT IN AN ORGANIZED HEALTH CARE EDUCATION/TRAINING PROGRAM

## 2025-07-14 PROCEDURE — 3078F DIAST BP <80 MM HG: CPT | Mod: CPTII,S$GLB,, | Performed by: STUDENT IN AN ORGANIZED HEALTH CARE EDUCATION/TRAINING PROGRAM

## 2025-07-14 RX ORDER — ALPRAZOLAM 1 MG/1
1 TABLET ORAL 2 TIMES DAILY PRN
Qty: 30 TABLET | Refills: 0 | Status: SHIPPED | OUTPATIENT
Start: 2025-07-14 | End: 2025-08-13

## 2025-07-14 RX ORDER — DEXAMETHASONE 4 MG/1
4 TABLET ORAL DAILY
Qty: 10 TABLET | Refills: 0 | Status: SHIPPED | OUTPATIENT
Start: 2025-07-14 | End: 2025-07-24

## 2025-07-14 RX ORDER — PANTOPRAZOLE SODIUM 40 MG/1
40 TABLET, DELAYED RELEASE ORAL DAILY
Qty: 90 TABLET | Refills: 3 | Status: SHIPPED | OUTPATIENT
Start: 2025-07-14 | End: 2026-07-14

## 2025-07-14 NOTE — PROGRESS NOTES
Chief Complaint   Patient presents with    Shortness of Breath    Chest Injury     HPI: Octavio Pang is a 59 y.o. male  with Pmhx listed below who presents to clinic for    History of Present Illness    CHIEF COMPLAINT:  - Mr. Pang presents with shortness of breath and difficulty walking, reporting a significant decline in respiratory function since a lung surgery in February.    HPI:  Mr. Pang reports severe shortness of breath since February, following lung surgery for collapsed lungs. His condition has significantly worsened since then, with difficulty walking to the bathroom without becoming extremely short of breath. He was bedridden for a period, requiring assistance for basic needs due to inability to reach the bathroom.    He currently uses 2 L of oxygen, primarily when moving around or sleeping. He reports chest pain, rating his pain level at 7/10. Mr. Pang also has a persistent cough, sometimes becoming so severe that he has choking sensations and difficulty breathing. Expectorating phlegm improves his breathing.    Mr. Pang has a history of recurrent infections, including bacterial infections and pneumonia since his surgery. He has been evaluated by multiple healthcare providers, including a pulmonologist (Dr. Patten) last month, and has an upcoming appointment with a lung specialist in 2 days. He is under palliative care, managed by Jessica Coleman, whom he last saw about a month ago.    Mr. Pang expresses confusion and anxiety about his condition, stating he was diagnosed with COPD and pulmonary fibrosis, conditions he was unaware of before the surgery. His CT revealed bronchiectasis, which was explained as an increased risk of infection.    Mr. Pang states he does not feel like he is dying despite being told he has end-stage disease.           Problem List:  Problem List[1]    ROS: Negative except as noted above.       Current Meds:  Current Medications[2]   PE:  BP: 136/60  Pulse: 85  Resp: 17   Temp: 97.9 °F (36.6 °C)  Weight: 53.8 kg (118 lb 9.7 oz) Body mass index is 18.03 kg/m².    Wt Readings from Last 5 Encounters:   07/14/25 53.8 kg (118 lb 9.7 oz)   06/12/25 52.7 kg (116 lb 2.9 oz)   06/04/25 53.1 kg (117 lb)   06/04/25 53.3 kg (117 lb 8.1 oz)   05/06/25 53.3 kg (117 lb 8.1 oz)     General appearance: alert and cooperative, not in acute distress  Head: normocephalic, without obvious abnormality, atraumatic  Eyes: conjunctivae/corneas clear. PERRL, EOM's intact.  Ears: clear tympanic membranes   Neck: no adenopathy, supple, symmetrical, trachea midline and thyroid not enlarged, symmetric, no tenderness/mass/nodules, no JVD  Throat: lips, mucosa, and tongue normal; teeth and gums normal; no thrush  Chest: no reproducible chest pain   Heart: regular rate and rhythm, S1, S2 normal, no murmur, click, rub or gallop  Lungs: unlabored respiration, bilateral equal air entry, normal vesicular breath sound heard, no wheezing, rhonchi   Abdomen: soft, non-tender, non-distended; bowel sounds +; no masses,  no organomegaly, no ascites   Extremities: normal, atraumatic, no cyanosis or edema noted B/L upper and lower extremities.  Skin: skin color, texture, turgor normal. No rashes or lesions noted.  Neurologic: grossly intact      Lab:  Lab Results   Component Value Date    WBC 14.22 (H) 06/04/2025    HGB 12.8 (L) 06/04/2025    HCT 37.5 (L) 06/04/2025    MCV 89 06/04/2025     (H) 06/04/2025     06/04/2025    K 4.2 06/04/2025    CL 99 06/04/2025    CO2 24 06/04/2025    BUN 16 06/04/2025    GLU 98 06/04/2025    CALCIUM 9.7 06/04/2025    MG 1.5 (L) 06/04/2025    AST 19 06/04/2025    ALT 16 06/04/2025    CHOL 186 08/19/2024    HDL 41 08/19/2024    LDLCALC 123 (H) 08/19/2024    TRIG 124 08/19/2024    TSH 2.440 05/04/2025       Impression:    ICD-10-CM ICD-9-CM    1. Chronic respiratory failure with hypoxia, on home O2 therapy  J96.11 518.83 WHEELCHAIR FOR HOME USE    Z99.81 799.02 dexAMETHasone  (DECADRON) 4 MG Tab     V46.2       2. Pulmonary fibrosis  J84.10 515       3. Encounter for palliative care  Z51.5 V66.7       4. Tobacco smoker, 1 pack of cigarettes or less per day  F17.210 305.1 Ambulatory referral/consult to Smoking Cessation Program      5. Gastroesophageal reflux disease, unspecified whether esophagitis present  K21.9 530.81 pantoprazole (PROTONIX) 40 MG tablet      6. Anxiety  F41.9 300.00 ALPRAZolam (XANAX) 1 MG tablet      Ambulatory referral/consult to Psychiatry          Assessment & Plan    CHRONIC OBSTRUCTIVE PULMONARY DISEASE (COPD):  - Monitored patient on 2 L of oxygen for COPD management.  - Mr. Pang experiences shortness of breath and gasping for air, especially when standing up or walking.  - Explained COPD pathophysiology, comparing normal lungs to a balloon and COPD lungs to a paper bag.  - Discussed importance of oxygen therapy and smoking cessation.  - Started Robitussin (dextromethorphan/guaifenesin) for cough and sputum production.    PULMONARY FIBROSIS:  - Evaluated shortness of breath related to pulmonary fibrosis.  - Discussed disease management through palliative care and clarified difference between palliative care and hospice.  - Considered palliative care involvement for chronic condition management and pain control.  - Continued oxygen therapy.    PNEUMOTHORAX:  - Monitored patient who had a pneumothorax and was sent home with a drainage box following surgery.  - Mr. Pang experienced shortness of breath post-surgery.    POSTPROCEDURAL PNEUMOTHORAX:  - Monitored patient who experienced postprocedural pneumothorax after surgery.  - Mr. Pang was sent home with a drainage box to address the condition.    BRONCHIECTASIS:  - Recent CT chest revealed bronchiectasis, increasing risk of infection.  - Mr. Pang experiences coughing and shortness of breath.  - Instructed patient on proper technique for chest physiotherapy to assist with mucus clearance.  - Advised  monitoring changes in cough characteristics and to report for potential antibiotic treatment.    CHRONIC RESPIRATORY FAILURE WITH HYPOXIA:  - Monitored patient on 2 L of oxygen due to chronic respiratory failure with hypoxia.  - Discussed importance of oxygen therapy for management of symptoms including shortness of breath.    PNEUMONIA:  - Monitored patient who had pneumonia with symptoms of coughing and phlegm production.  - Discussed pneumonia management and advised patient to monitor cough and phlegm production.  - Instructed to contact office if there are changes in cough characteristics, increased sputum production, foul-smelling sputum, or change in sputum color.    ATTENTION-DEFICIT HYPERACTIVITY DISORDER (ADHD):  - Evaluated patient previously managed with Vyvanse and methylphenidate for ADHD.  - Referred to psychiatry for re-evaluation and potential medication adjustment.    ANXIETY DISORDER:  - Monitored patient experiencing anxiety currently managed with Xanax.  - Continued alprazolam 0.5 mg twice daily.  - Referred to psychiatry for anxiety management and potential medication adjustment.    NICOTINE DEPENDENCE:  - Advised smoking cessation and prescribed Chantix.  - Mr. Pang is currently using Chantix and patches in attempt to quit smoking.  - Referred to smoking cessation program for additional support.    DEPENDENCE ON SUPPLEMENTAL OXYGEN:  - Mr. Pang requires 2 L of oxygen, primarily during sleep and movement.  - Experiences shortness of breath and gasping for air, especially when standing or walking.  -   Advised not to increase oxygen flow beyond prescribed 2 L.    1. Chronic respiratory failure with hypoxia, on home O2 therapy (Primary)  On oxygen at 2 liters flow  Following pulmonology, next visit on 07/16/2025  - WHEELCHAIR FOR HOME USE  - dexAMETHasone (DECADRON) 4 MG Tab; Take 1 tablet (4 mg total) by mouth once daily. for 10 days  Dispense: 10 tablet; Refill: 0    2. Pulmonary  fibrosis  CTA chest non coronary 05/2025: Severe high-grade chronic interstitial pulmonary fibrosis with bronchiectasis and honeycombing.   On nintedanib to be continued.  Was evaluated by lung transplant on 06/17/2025 for possible lung transplant but found out him of being non compatible with it, due to his current worsening of condition following VATS and debility. He is following palliative care for now. Last visit with them was on 06/19/2025  On xanax PRN for anxiety and Roxicodone for pain relief  Counseled and made aware regarding the potential effects of taking BZD together with opoid especially in a patient with respiratory failure.      3. Encounter for palliative care  As above.    4. Tobacco smoker, 1 pack of cigarettes or less per day  Assistance with smoking cessation was offered, including:  []  Medications  [x]  Counseling  []  Printed Information on Smoking Cessation  [x]  Referral to a Smoking Cessation Program    Patient was counseled regarding smoking for 3-10 minutes.    - Ambulatory referral/consult to Smoking Cessation Program; Future    5. Gastroesophageal reflux disease, unspecified whether esophagitis present  - pantoprazole (PROTONIX) 40 MG tablet; Take 1 tablet (40 mg total) by mouth once daily.  Dispense: 90 tablet; Refill: 3    6. Anxiety  - ALPRAZolam (XANAX) 1 MG tablet; Take 1 tablet (1 mg total) by mouth 2 (two) times daily as needed for Anxiety.  Dispense: 30 tablet; Refill: 0  - Ambulatory referral/consult to Psychiatry; Future             Future Appointments   Date Time Provider Department Center   7/16/2025  2:30 PM PULMONARY REHAB, [facility] PULRE[facility]   7/31/2025  9:00 AM Leon Forde MD [facility] CARDIO [place]   9/3/2025 10:45 AM [facility]Kn Abdul [facility] [place]   10/8/2025  1:00 PM Tomás Abdul MD [facility] CARDIO [place]   11/6/2025 11:00 AM PULMONARY DISEASE MANAGEMENT [facility] PULFUN [place]       I spent a total of 32 minutes on the day of the  visit.This includes face to face time and non-face to face time preparing to see the patient (eg, review of tests), obtaining and/or reviewing separately obtained history, documenting clinical information in the electronic or other health record, independently interpreting results and communicating results to the patient/family/caregiver, or care coordinator.  Visit today included increased complexity associated with the care of the episodic problem   addressed and managing the longitudinal care of the patient due to the serious and/or complex managed problem(s) .     Lucas Bowman MD    This note was generated with the assistance of ambient listening technology. Verbal consent was obtained by the patient and accompanying visitor(s) for the recording of patient appointment to facilitate this note. I attest to having reviewed and edited the generated note for accuracy, though some syntax or spelling errors may persist. Please contact the author of this note for any clarification.          [1]   Patient Active Problem List  Diagnosis    Encounter for postoperative care related to surgical joint fusion    Post laminectomy syndrome    Spontaneous pneumothorax    Tobacco smoker, 1 pack of cigarettes or less per day    Anxiety    Abnormal CT of the chest    Chronic respiratory failure with hypoxia, on home O2 therapy    Pulmonary fibrosis    Encounter for palliative care    GOMEZ (dyspnea on exertion)   [2]   Current Outpatient Medications   Medication Sig Dispense Refill    albuterol (PROVENTIL/VENTOLIN HFA) 90 mcg/actuation inhaler Inhale 2 puffs into the lungs every 6 (six) hours as needed.      albuterol-ipratropium (DUO-NEB) 2.5 mg-0.5 mg/3 mL nebulizer solution Take 3 mLs by nebulization every 6 (six) hours as needed for Wheezing or Shortness of Breath. Rescue 360 mL 3    busPIRone (BUSPAR) 7.5 MG tablet Take 7.5 mg by mouth 3 (three) times daily.      cyanocobalamin (VITAMIN B-12) 1000 MCG tablet Take 1 tablet by  mouth every morning.      dexAMETHasone (DECADRON) 4 MG Tab Take 1 tablet (4 mg total) by mouth every 12 (twelve) hours for 10 days, THEN 1 tablet (4 mg total) once daily. 50 tablet 0    fluticasone-salmeterol diskus inhaler 500-50 mcg Inhale 1 puff into the lungs 2 (two) times daily. Controller 60 each 5    gabapentin (NEURONTIN) 100 MG capsule Take 100 mg by mouth as needed.      levoFLOXacin (LEVAQUIN) 500 MG tablet Take 1 tablet (500 mg total) by mouth once daily. 7 tablet 0    methocarbamol (ROBAXIN) 500 MG Tab Take 500 mg by mouth.      nintedanib (OFEV) 150 mg Cap Take 1 capsule (150 mg total) by mouth 2 (two) times a day. 60 capsule 11    nystatin (MYCOSTATIN) 100,000 unit/mL suspension Take 6 mLs (600,000 Units total) by mouth 4 (four) times daily. Use for 10 days. Repeat if symptoms persist. 473 mL 2    oxyCODONE (ROXICODONE) 10 mg Tab immediate release tablet Take 1 tablet (10 mg total) by mouth every 6 (six) hours as needed (dyspnea). 120 tablet 0    ALPRAZolam (XANAX) 1 MG tablet Take 1 tablet (1 mg total) by mouth 2 (two) times daily as needed for Anxiety. 30 tablet 0    dexAMETHasone (DECADRON) 4 MG Tab Take 1 tablet (4 mg total) by mouth once daily. for 10 days 10 tablet 0    pantoprazole (PROTONIX) 40 MG tablet Take 1 tablet (40 mg total) by mouth once daily. 90 tablet 3     No current facility-administered medications for this visit.

## 2025-07-16 ENCOUNTER — HOSPITAL ENCOUNTER (OUTPATIENT)
Dept: PULMONOLOGY | Facility: HOSPITAL | Age: 60
Discharge: HOME | End: 2025-07-16
Attending: INTERNAL MEDICINE
Payer: MEDICARE

## 2025-07-16 DIAGNOSIS — J44.9 COPD, SEVERE: ICD-10-CM

## 2025-07-16 NOTE — PROGRESS NOTES
Pt came for intake appt to pulm rehab today. After discussing the commitment to attending 2 days a week. Pt states he cannot commit to pulm rehab at this time due to transportation issues. He states he will contact us when he has transportation and possibly start rehab. Pt given Pulm rehab guide. Exercises for home and breathing techniques discussed. Dr. Abdul, ordering physician, informed.

## 2025-07-18 ENCOUNTER — PATIENT MESSAGE (OUTPATIENT)
Dept: PALLIATIVE MEDICINE | Facility: CLINIC | Age: 60
End: 2025-07-18
Payer: MEDICARE

## 2025-07-18 DIAGNOSIS — J96.11 CHRONIC RESPIRATORY FAILURE WITH HYPOXIA, ON HOME O2 THERAPY: ICD-10-CM

## 2025-07-18 DIAGNOSIS — Z99.81 CHRONIC RESPIRATORY FAILURE WITH HYPOXIA, ON HOME O2 THERAPY: ICD-10-CM

## 2025-07-18 DIAGNOSIS — J44.9 COPD, SEVERE: Primary | ICD-10-CM

## 2025-07-18 DIAGNOSIS — Z51.5 ENCOUNTER FOR PALLIATIVE CARE: ICD-10-CM

## 2025-07-18 RX ORDER — FLUTICASONE PROPIONATE AND SALMETEROL 500; 50 UG/1; UG/1
1 POWDER RESPIRATORY (INHALATION) 2 TIMES DAILY
Qty: 60 EACH | Refills: 5 | Status: SHIPPED | OUTPATIENT
Start: 2025-07-18 | End: 2026-01-14

## 2025-07-21 ENCOUNTER — TELEPHONE (OUTPATIENT)
Dept: HEMATOLOGY/ONCOLOGY | Facility: CLINIC | Age: 60
End: 2025-07-21
Payer: MEDICARE

## 2025-07-21 RX ORDER — OXYCODONE HYDROCHLORIDE 10 MG/1
10 TABLET ORAL EVERY 6 HOURS PRN
Qty: 120 TABLET | Refills: 0 | Status: SHIPPED | OUTPATIENT
Start: 2025-07-21 | End: 2025-08-20

## 2025-07-21 NOTE — TELEPHONE ENCOUNTER
----- Message from Sheryl Coleman NP sent at 7/21/2025  8:59 AM CDT -----  Regarding: follow up appt  Please schedule f/u appt. I just refilled his opioids and he's due f/u here.  Thank you!

## 2025-07-21 NOTE — TELEPHONE ENCOUNTER
Called patient to schedule follow up visit with Sheryl Coleman NP in Palliative Care. Unable to leave voicemail due to full voicemail box.

## 2025-07-25 ENCOUNTER — PATIENT MESSAGE (OUTPATIENT)
Dept: PULMONOLOGY | Facility: CLINIC | Age: 60
End: 2025-07-25
Payer: MEDICARE

## 2025-07-25 ENCOUNTER — PATIENT MESSAGE (OUTPATIENT)
Dept: PALLIATIVE MEDICINE | Facility: CLINIC | Age: 60
End: 2025-07-25
Payer: MEDICARE

## 2025-07-25 DIAGNOSIS — Z99.81 CHRONIC RESPIRATORY FAILURE WITH HYPOXIA, ON HOME O2 THERAPY: ICD-10-CM

## 2025-07-25 DIAGNOSIS — J96.11 CHRONIC RESPIRATORY FAILURE WITH HYPOXIA, ON HOME O2 THERAPY: ICD-10-CM

## 2025-07-25 DIAGNOSIS — A49.8 PROTEUS INFECTION: ICD-10-CM

## 2025-07-25 DIAGNOSIS — K12.30 STOMATITIS AND MUCOSITIS: ICD-10-CM

## 2025-07-25 DIAGNOSIS — K12.1 STOMATITIS AND MUCOSITIS: ICD-10-CM

## 2025-07-25 NOTE — TELEPHONE ENCOUNTER
No care due was identified.  [facility] Embedded Care Due Messages. Reference number: [id number].   7/25/2025 4:29:51 PM CDT

## 2025-07-28 ENCOUNTER — TELEPHONE (OUTPATIENT)
Dept: PALLIATIVE MEDICINE | Facility: CLINIC | Age: 60
End: 2025-07-28
Payer: MEDICARE

## 2025-07-28 RX ORDER — DEXAMETHASONE 4 MG/1
4 TABLET ORAL DAILY
Qty: 10 TABLET | Refills: 0 | Status: SHIPPED | OUTPATIENT
Start: 2025-07-28 | End: 2025-07-29 | Stop reason: SDUPTHER

## 2025-07-28 RX ORDER — NYSTATIN 100000 [USP'U]/ML
6 SUSPENSION ORAL 4 TIMES DAILY
Qty: 473 ML | Refills: 2 | Status: SHIPPED | OUTPATIENT
Start: 2025-07-28 | End: 2025-09-26

## 2025-07-28 NOTE — TELEPHONE ENCOUNTER
Copied from CRM #[id number]. Topic: General Inquiry - Patient Advice  >> Jul 26, 2025 11:03 AM [place] wrote:  Type:  Needs Medical Advice    Who Called: pt  Would the patient rather a call back or a response via [facility]? call  Best Call Back Number:  [phone]  Additional Information: pt states he was on the virtual waiting for appt for 7.24 and nobody never came on and now its saying he missed appt wanting to get corrected with office

## 2025-07-28 NOTE — TELEPHONE ENCOUNTER
Contacted pt in regards to r/s missed appt last week. Informed pt that our system failed to show that pt was checked in and ready for VV. Pt has questions and concerns about medications and voiced that he would like to see if provider could assist with obtaining assistive device such as a wheelchair. Pt voiced that he has been experiencing more dyspnea with exertion. VV scheduled for 7/29 9:30 am. Pt verbalized understanding and confirmed appt. Call ended well. Provider notified.

## 2025-07-29 ENCOUNTER — OFFICE VISIT (OUTPATIENT)
Dept: PALLIATIVE MEDICINE | Facility: CLINIC | Age: 60
End: 2025-07-29
Payer: MEDICARE

## 2025-07-29 DIAGNOSIS — R06.09 DOE (DYSPNEA ON EXERTION): ICD-10-CM

## 2025-07-29 DIAGNOSIS — Z99.81 CHRONIC RESPIRATORY FAILURE WITH HYPOXIA, ON HOME O2 THERAPY: Primary | ICD-10-CM

## 2025-07-29 DIAGNOSIS — F41.9 ANXIETY: ICD-10-CM

## 2025-07-29 DIAGNOSIS — J96.11 CHRONIC RESPIRATORY FAILURE WITH HYPOXIA, ON HOME O2 THERAPY: Primary | ICD-10-CM

## 2025-07-29 DIAGNOSIS — J84.10 PULMONARY FIBROSIS: ICD-10-CM

## 2025-07-29 DIAGNOSIS — Z51.5 ENCOUNTER FOR PALLIATIVE CARE: ICD-10-CM

## 2025-07-29 PROCEDURE — 98007 SYNCH AUDIO-VIDEO EST HI 40: CPT | Mod: 95,,, | Performed by: NURSE PRACTITIONER

## 2025-07-29 RX ORDER — ALPRAZOLAM 1 MG/1
1 TABLET ORAL 3 TIMES DAILY PRN
Qty: 90 TABLET | Refills: 0 | Status: SHIPPED | OUTPATIENT
Start: 2025-07-29 | End: 2025-08-28

## 2025-07-29 RX ORDER — DEXAMETHASONE 4 MG/1
4 TABLET ORAL DAILY
Qty: 30 TABLET | Refills: 0 | Status: SHIPPED | OUTPATIENT
Start: 2025-08-04 | End: 2025-09-03

## 2025-07-29 RX ORDER — OXYCODONE HYDROCHLORIDE 10 MG/1
10 TABLET ORAL
Qty: 210 TABLET | Refills: 0 | Status: CANCELLED | OUTPATIENT
Start: 2025-07-29 | End: 2025-08-28

## 2025-07-29 RX ORDER — OXYCODONE HYDROCHLORIDE 10 MG/1
TABLET ORAL
Qty: 120 TABLET | Refills: 0 | OUTPATIENT
Start: 2025-07-29

## 2025-07-29 RX ORDER — OXYCODONE HYDROCHLORIDE 15 MG/1
15 TABLET ORAL EVERY 4 HOURS PRN
Qty: 180 TABLET | Refills: 0 | Status: SHIPPED | OUTPATIENT
Start: 2025-07-29 | End: 2025-08-28

## 2025-07-29 NOTE — PROGRESS NOTES
Palliative Medicine Clinic Note  The patient location is:  Patient Home   The chief complaint leading to consultation is: dyspnea, anxiety       Visit type: Virtual visit with synchronous audio and video  Each patient to whom he or she provides medical services by telemedicine is:  (1) informed of the relationship between the physician and patient and the respective role of any other health care provider with respect to management of the patient; and (2) notified that he or she may decline to receive medical services by telemedicine and may withdraw from such care at any time.       ASSESSMENT/PLAN:      Plan/Recommendations:  1. Chronic respiratory failure with hypoxia, on home O2 therapy  Assessment & Plan:  Severe. R/T pulmonary fibrosis + COPD.   Lung transplant denied due to multiple barriers.   Continues home oxygen, OFEV  Discussed energy conservation measures.   Has w/c for home use. He would like to consider motorized w/c.   Continue benzodiazepines and opioids for severe GOMEZ.   Intermittent steroids PO helpful with dyspnea.      2. Anxiety  Assessment & Plan:  Severe however he is coping better with his illness.  May benefit from counseling; I will refer.   Increasing dose of Xanax. Consider clonazepam if no relief.      3. Encounter for palliative care  Assessment & Plan:  Impression:  Symptoms:  GOMEZ  Medicolegal: Mr Pang has decision making capacity.  He has no surrogate decision maker or HCPOA. Written information on ACP, HC POA provided. Mr. Pang has reviewed the information and we will discuss/complete next office visit.   Psychosocial:  support system consists of sister, friend.   Understanding of disease and Illness Trajectory: Patient and Family  has  adequate understanding of his illness, they can benefit from continued education on what to expect in the future.  Goals of care:  Understands his illness is life-limiting. He would like to prioritize comfort for the remainder of his life.      Summary and recommendations: Rx oxycodone, increasing dose, and dexamethasone PRN.   2 week f/u should be in person to fulfill 12 week requirement for opioid prescribing.               4. Pulmonary fibrosis  Assessment & Plan:  Advanced lung disease with severe symptoms despite maximum therapy.   Followed by pulmonology.   Increasing opioid to improve dyspnea mgmt. We discussed benefit versus risk and pt agrees this is the best option.       5. GOMEZ (dyspnea on exertion)  Assessment & Plan:  R/T ES pulmonary fibrosis; sx are severe.  Continues dexamethasone + oxycodone PRN.    Will increase oxycodone dose and PRN frequency. We discussed using truly PRN. Long acting not available from pharmacy.  Encouraged liberal O2 use.   We discussed concerns of opioid rx + steroid. He would like to continue with plan.  He would benefit from a motorized w/c or power chair due to severe GOMEZ.             Advance Care Planning   Advance Directives:   Living Will: No    LaPOST: No    Do Not Resuscitate Status: No    Medical Power of : No      Decision Making:  Patient answered questions  Goals of Care: The patient endorses that what is most important right now is to focus on remaining as independent as possible, symptom/pain control, and comfort and QOL     Accordingly, we have decided that the best plan to meet the patient's goals includes pivot to comfort-focused care.             Thank you for involving me in the care of this patient.     Follow up in about 2 weeks (around 8/12/2025) for visit should be in person to meet requirements for prescribing opioids. .    Future Appointments   Date Time Provider Department Center   7/31/2025  9:00 AM Leon Forde MD [facility] CARDIO [place]   9/3/2025 10:45 AM Kun Abdul MD [facility] PULMSVC [place]   10/8/2025  1:00 PM Tomás Abdul MD [facility] CARDIO [place]   11/6/2025 11:00 AM PULMONARY DISEASE MANAGEMENT [facility] PULMFUN [place]        SUBJECTIVE:      History of  "Present Illness / Interval History:  Octavio Pang is 59 y.o. male with severe pulmonary fibrosis and COPD, with hypoxemia-related respiratory failure requiring home oxygen use. He has been referred to lung transplant team for consideration. Medical history also includes tobacco use, ADHD, anxiety and h/o bilateral pneumothoraces. Presents to Palliative Care Clinic for physical symptoms, advance care planning, and additional support.      7/14/25 PCP  Mr. Pang expresses confusion and anxiety about his condition, stating he was diagnosed with COPD and pulmonary fibrosis, conditions he was unaware of before the surgery. His CT revealed bronchiectasis, which was explained as an increased risk of infection.  Mr. Pang states he does not feel like he is dying despite being told he has end-stage disease.        Interval History  History obtained from: patient and medical record.    PCP filled 10 d of dexamethasone 7/14-7/24/25.  Has not filled long acting oxycodone.     Trouble "getting around."   Describes "panic attack." Believes he is able to distract himself.   Needs w/c due to severe dyspnea.     Necessity for wheelchair:  Octavio Pang has a mobility limitation that significantly impairs his/her ability to participate in one or more mobility related activities (MRADLs) such as toileting, feeding, dressing, grooming and bathing. This mobility limitation cannot sufficiently be resolved by the use of a cane or walker. The use of a manual wheelchair will significantly improve this patient's ability to participate in MRADLs and will be used regularly within the home. Octavio Pang has expressed willingness to use this wheelchair in the home and has a caregiver who is available and willing to assist with using the wheelchair as necessary.      Remains concerned about dyspnea worsening after surgery.   Checking into [employer].     Motorized wheelchair  Cannot get to pulmonary rehab in  due to transportation.      Asked " about Vyvance for ADHD. I expressed my concern of possible cardiac side effects of stimulant use with his advanced heart disease.   He mentions medical marijuana, we discussed risks/benefits.     Previously taking oxyIR q4 hours. Now taking q 6 hours around the clock instead of PRN.   Significant cough and congestion during the night. He requested antibiotics recently. Productive cough with white/clear sputum.     Mother passed away with pulmonary fibrosis.   Wants to be comfortable. Working to be more comfortable.     Xanax 1 mg BID helpful for anxiety. Previously took TID with good control of anxiety. He would like try TID again.       Today we discussed symptom management, goals of care, and advanced care planning.        ROS:  Review of Systems    Palliative Exam    Medications:  Current Medications[1]    External  database queried on 07/29/2025  by Sheryl PHILLIPS :         Review of patient's allergies indicates:   Allergen Reactions    Ancef [cefazolin] Other (See Comments)     Headaches and dizziness        OBJECTIVE:   Physical Exam:  Vitals:      Physical Exam  Constitutional:       General: He is not in acute distress.     Appearance: He is ill-appearing.   Eyes:      General: No scleral icterus.  Pulmonary:      Comments: Mild tachypnea with speech and O2  Skin:     Coloration: Skin is not jaundiced.   Neurological:      Mental Status: He is alert.   Psychiatric:         Mood and Affect: Mood normal.         Behavior: Behavior normal.         Thought Content: Thought content normal.         Judgment: Judgment normal.           Labs:  Lab Results   Component Value Date    WBC 14.22 (H) 06/04/2025    HGB 12.8 (L) 06/04/2025    HCT 37.5 (L) 06/04/2025    MCV 89 06/04/2025     (H) 06/04/2025           Imaging:  None new     I spent a total of 40 minutes on the day of the visit. This includes face to face time in discussion of goals of care, symptom assessment, coordination of care and  emotional support.  This also includes non-face to face time preparing to see the patient (eg, review of tests/imaging), obtaining and/or reviewing separately obtained history, documenting clinical information in the electronic or other health record, independently interpreting results and communicating results to the patient/family/caregiver, or care coordinator.     Additional 17 min time spent on a voluntary advance care planning and /or goals of care discussion, providing emotional support, formulating and communicating prognosis and exploring burden/benefit of various approaches of treatment.       Sheryl Coleman NP         [1]   Current Outpatient Medications:     albuterol (PROVENTIL/VENTOLIN HFA) 90 mcg/actuation inhaler, Inhale 2 puffs into the lungs every 6 (six) hours as needed., Disp: , Rfl:     albuterol-ipratropium (DUO-NEB) 2.5 mg-0.5 mg/3 mL nebulizer solution, Take 3 mLs by nebulization every 6 (six) hours as needed for Wheezing or Shortness of Breath. Rescue, Disp: 360 mL, Rfl: 3    ALPRAZolam (XANAX) 1 MG tablet, Take 1 tablet (1 mg total) by mouth 2 (two) times daily as needed for Anxiety., Disp: 30 tablet, Rfl: 0    busPIRone (BUSPAR) 7.5 MG tablet, Take 7.5 mg by mouth 3 (three) times daily., Disp: , Rfl:     cyanocobalamin (VITAMIN B-12) 1000 MCG tablet, Take 1 tablet by mouth every morning., Disp: , Rfl:     dexAMETHasone (DECADRON) 4 MG Tab, Take 1 tablet (4 mg total) by mouth once daily. for 10 days, Disp: 10 tablet, Rfl: 0    fluticasone-salmeterol diskus inhaler 500-50 mcg, Inhale 1 puff into the lungs 2 (two) times daily. Controller, Disp: 60 each, Rfl: 5    gabapentin (NEURONTIN) 100 MG capsule, Take 100 mg by mouth as needed., Disp: , Rfl:     levoFLOXacin (LEVAQUIN) 500 MG tablet, Take 1 tablet (500 mg total) by mouth once daily., Disp: 7 tablet, Rfl: 0    methocarbamol (ROBAXIN) 500 MG Tab, Take 500 mg by mouth., Disp: , Rfl:     nintedanib (OFEV) 150 mg Cap, Take 1 capsule (150 mg  total) by mouth 2 (two) times a day., Disp: 60 capsule, Rfl: 11    nystatin (MYCOSTATIN) 100,000 unit/mL suspension, Take 6 mLs (600,000 Units total) by mouth 4 (four) times daily. Use for 10 days. Repeat if symptoms persist., Disp: 473 mL, Rfl: 2    pantoprazole (PROTONIX) 40 MG tablet, Take 1 tablet (40 mg total) by mouth once daily., Disp: 90 tablet, Rfl: 3

## 2025-07-29 NOTE — Clinical Note
Good morning! I follow Mr Bird for ES pulmonary fibrosis. He would benefit from a motorized w/c or scooter. Do you know how to get the process started or where to order? It seems I recall he will need PT eval. It's been a couple of years since I've successfully gotten one for a pt. Thank you!

## 2025-07-29 NOTE — ASSESSMENT & PLAN NOTE
Severe however he is coping better with his illness.  May benefit from counseling; I will refer.   Increasing dose of Xanax. Consider clonazepam if no relief.

## 2025-07-29 NOTE — ASSESSMENT & PLAN NOTE
Advanced lung disease with severe symptoms despite maximum therapy.   Followed by pulmonology.   Increasing opioid to improve dyspnea mgmt. We discussed benefit versus risk and pt agrees this is the best option.

## 2025-07-29 NOTE — ASSESSMENT & PLAN NOTE
Impression:  Symptoms:  GOMEZ  Medicolegal: Mr Pang has decision making capacity.  He has no surrogate decision maker or HCPOA. Written information on ACP, HC POA provided. Mr. Pang has reviewed the information and we will discuss/complete next office visit.   Psychosocial:  support system consists of sister, friend.   Understanding of disease and Illness Trajectory: Patient and Family  has  adequate understanding of his illness, they can benefit from continued education on what to expect in the future.  Goals of care:  Understands his illness is life-limiting. He would like to prioritize comfort for the remainder of his life.     Summary and recommendations: Rx oxycodone, increasing dose, and dexamethasone PRN.   2 week f/u should be in person to fulfill 12 week requirement for opioid prescribing.

## 2025-07-29 NOTE — ASSESSMENT & PLAN NOTE
R/T ES pulmonary fibrosis; sx are severe.  Continues dexamethasone + oxycodone PRN.    Will increase oxycodone dose and PRN frequency. We discussed using truly PRN. Long acting not available from pharmacy.  Encouraged liberal O2 use.   We discussed concerns of opioid rx + steroid. He would like to continue with plan.  He would benefit from a motorized w/c or power chair due to severe GOMEZ.

## 2025-07-29 NOTE — ASSESSMENT & PLAN NOTE
Severe. R/T pulmonary fibrosis + COPD.   Lung transplant denied due to multiple barriers.   Continues home oxygen, OFEV  Discussed energy conservation measures.   Has w/c for home use. He would like to consider motorized w/c.   Continue benzodiazepines and opioids for severe GOMEZ.   Intermittent steroids PO helpful with dyspnea.

## 2025-07-29 NOTE — PATIENT INSTRUCTIONS
[employer] is a website where they have a provider who can prescribe medical marijuana. They have a [employer] discount.

## 2025-07-30 DIAGNOSIS — J96.11 CHRONIC RESPIRATORY FAILURE WITH HYPOXIA, ON HOME O2 THERAPY: ICD-10-CM

## 2025-07-30 DIAGNOSIS — Z99.81 CHRONIC RESPIRATORY FAILURE WITH HYPOXIA, ON HOME O2 THERAPY: ICD-10-CM

## 2025-07-30 DIAGNOSIS — J84.10 PULMONARY FIBROSIS: ICD-10-CM

## 2025-07-30 DIAGNOSIS — R06.09 DOE (DYSPNEA ON EXERTION): Primary | ICD-10-CM

## 2025-07-31 ENCOUNTER — PATIENT MESSAGE (OUTPATIENT)
Dept: PALLIATIVE MEDICINE | Facility: CLINIC | Age: 60
End: 2025-07-31
Payer: MEDICARE

## 2025-07-31 ENCOUNTER — OFFICE VISIT (OUTPATIENT)
Dept: CARDIOLOGY | Facility: CLINIC | Age: 60
End: 2025-07-31
Payer: MEDICARE

## 2025-07-31 VITALS
DIASTOLIC BLOOD PRESSURE: 72 MMHG | HEART RATE: 111 BPM | OXYGEN SATURATION: 96 % | WEIGHT: 113.13 LBS | SYSTOLIC BLOOD PRESSURE: 110 MMHG | BODY MASS INDEX: 17.14 KG/M2 | HEIGHT: 68 IN

## 2025-07-31 DIAGNOSIS — R06.09 DOE (DYSPNEA ON EXERTION): Primary | ICD-10-CM

## 2025-07-31 DIAGNOSIS — I25.10 CORONARY ARTERY CALCIFICATION: ICD-10-CM

## 2025-07-31 DIAGNOSIS — J84.9 ILD (INTERSTITIAL LUNG DISEASE): ICD-10-CM

## 2025-07-31 DIAGNOSIS — Z99.81 CHRONIC RESPIRATORY FAILURE WITH HYPOXIA, ON HOME O2 THERAPY: ICD-10-CM

## 2025-07-31 DIAGNOSIS — J96.11 CHRONIC RESPIRATORY FAILURE WITH HYPOXIA, ON HOME O2 THERAPY: ICD-10-CM

## 2025-07-31 PROCEDURE — 99999 PR PBB SHADOW E&M-EST. PATIENT-LVL IV: CPT | Mod: PBBFAC,,, | Performed by: INTERNAL MEDICINE

## 2025-07-31 NOTE — PROGRESS NOTES
Subjective:   Patient ID:  Octavio Pang is a 59 y.o. male who presents for evaluation of Shortness of Breath, Chest Pain, and Panic Attack      58 yo referred for SOB by himself  [facility] ILD on home . H/o pneumothorax in  h/o VTAS, former smoker ADHD  Mother had ILD,   End stage ILD and not candidate for lung Tx  EKG reviewed by myself today reveals NSR nonspecific STT change  SOB worse   C            History of Present Illness    CHIEF COMPLAINT:  - Octavio presents with significant dyspnea and concerns about recently diagnosed end-stage lung disease, including pulmonary fibrosis and COPD.    HPI:  - Reports history of productive cough with white sputum, initially prompting medical evaluation  - Was still able to perform physical activities such as lawn mowing and tire changing at that time  - Chest XR revealed bilateral pneumothoraces, requiring immediate intervention at [facility]  - Underwent surgery with Dr. Ruiz, involving chest tube insertion and a procedure referred to as chalk  - Experienced significant respiratory function decline  - Reports significant dyspnea, particularly with positional changes or ambulation, to the extent of having difficulty reaching the bathroom  - Describes post-op respiratory distress, though Dr. 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  - Acknowledges  awareness of gradual lung function decline but maintained ability to work prior to the recent surgery  - Currently attempting to increase physical activity, including pool exercises, in hopes of qualifying for lung transplantation  - Needs to achieve an 800-foot walk test to be considered a candidate for lung transplantation  - Expresses concern about potential cardiac complications and the risk of overexertion leading to MI  - Mentions an ED visit for comprehensive evaluation, including CTs of his heart and lungs  - Reports being informed of CAD, though cardiac imaging was suboptimal  - Does not deny any symptoms or medical diagnoses    CARDIAC HISTORY:  - CT: CAD  - CT: CAD    IMAGING:  - Chest XR: Showed bilateral pneumothoraces  - CT: Included heart and lungs, suboptimal cardiac imaging    MEDICAL HISTORY:  - Pulmonary fibrosis  - COPD: End stage    SURGICAL HISTORY:  - Chest tube placement: For bilateral pneumothoraces  - Lung surgery: Performed by Dr. Ruiz, patient developed post-op dyspnea    FAMILY HISTORY:  - Mother: pulmonary thrombosis    SOCIAL HISTORY:  - Smoking: Former smoker  - Occupation: Worked in chemical plants        No results found for this or any previous visit.     No results found for this or any previous visit.       Past Medical History:   Diagnosis Date    ADD (attention deficit disorder)     Anxiety     Back pain     COPD (chronic obstructive pulmonary disease)     Depression     Emphysema, unspecified     Gout     Interstitial lung disease     Neuromuscular disorder     Pneumothorax, unspecified        Past Surgical History:   Procedure Laterality Date    BACK SURGERY  10/13/2016    INJECTION OF ANESTHETIC AGENT AROUND MULTIPLE INTERCOSTAL NERVES Right 2/21/2025    Procedure: BLOCK, NERVE, INTERCOSTAL, 2 OR MORE;  Surgeon: Tim Moss MD;  Location: [facility];  Service: Cardiothoracic;  Laterality: Right;  MULTIPLE INTERCOSTAL NERVE BLOCKS    NECK SURGERY      PLEURODESIS WITH  VIDEO-ASSISTED THORACOSCOPIC SURGERY (VATS) Right 2/21/2025    Procedure: VATS, WITH PLEURODESIS;  Surgeon: Tim Moss MD;  Location: [facility];  Service: Cardiothoracic;  Laterality: Right;  CHEMICAL AND MECHANICAL PLEURODESIS       Social History[1]    Family History   Problem Relation Name Age of Onset    Arthritis Father      Pulmonary fibrosis Mother         ROS    Objective:   Physical Exam  HENT:      Head: Normocephalic.   Eyes:      Pupils: Pupils are equal, round, and reactive to light.   Neck:      Thyroid: No thyromegaly.      Vascular: Normal carotid pulses. No carotid bruit or JVD.   Cardiovascular:      Rate and Rhythm: Regular rhythm. Tachycardia present. No extrasystoles are present.     Chest Wall: PMI is not displaced.      Pulses: Normal pulses.      Heart sounds: Normal heart sounds. No murmur heard.     No gallop. No S3 sounds.   Pulmonary:      Effort: No respiratory distress.      Breath sounds: No stridor. Decreased breath sounds and rales present.   Abdominal:      General: Bowel sounds are normal.      Palpations: Abdomen is soft.      Tenderness: There is no abdominal tenderness. There is no rebound.   Skin:     Findings: No rash.   Neurological:      Mental Status: He is alert and oriented to person, place, and time.   Psychiatric:         Behavior: Behavior normal.         Lab Results   Component Value Date    CHOL 186 08/19/2024    CHOL 209 (H) 03/01/2018     Lab Results   Component Value Date    HDL 41 08/19/2024    HDL 47 03/01/2018     Lab Results   Component Value Date    LDLCALC 123 (H) 08/19/2024    LDLCALC 104.4 03/01/2018     Lab Results   Component Value Date    TRIG 124 08/19/2024    TRIG 288 (H) 03/01/2018     Lab Results   Component Value Date    CHOLHDL 22.5 03/01/2018       Chemistry        Component Value Date/Time     06/04/2025 1442     03/21/2025 0941    K 4.2 06/04/2025 1442    K 3.9 03/21/2025 0941    CL 99 06/04/2025 1442     03/21/2025 0941  "   CO2 24 06/04/2025 1442    CO2 24 03/21/2025 0941    BUN 16 06/04/2025 1442    CREATININE 0.7 06/04/2025 1442    GLU 98 06/04/2025 1442    GLU 88 03/21/2025 0941        Component Value Date/Time    CALCIUM 9.7 06/04/2025 1442    CALCIUM 9.3 03/21/2025 0941    ALKPHOS 74 06/04/2025 1442    ALKPHOS 87 03/21/2025 0941    AST 19 06/04/2025 1442    AST 16 03/21/2025 0941    ALT 16 06/04/2025 1442    ALT 15 03/21/2025 0941    BILITOT 0.4 06/04/2025 1442    BILITOT 0.2 03/21/2025 0941    ESTGFRAFRICA >60.0 03/01/2018 1529    EGFRNONAA >60.0 03/01/2018 1529          Lab Results   Component Value Date    HGBA1C 4.7 03/01/2018     Lab Results   Component Value Date    TSH 2.440 05/04/2025     No results found for: "INR", "PROTIME"  Lab Results   Component Value Date    WBC 14.22 (H) 06/04/2025    HGB 12.8 (L) 06/04/2025    HCT 37.5 (L) 06/04/2025    MCV 89 06/04/2025     (H) 06/04/2025     BNP  @LABRCNTIP(BNP,BNPTRIAGEBLO)@  CrCl cannot be calculated (Patient's most recent lab result is older than the maximum 7 days allowed.).  No results found in the last 24 hours.  No results found in the last 24 hours.  No results found in the last 24 hours.    Assessment:      1. GOMEZ (dyspnea on exertion)    2. Chronic respiratory failure with hypoxia, on home O2 therapy    3. ILD (interstitial lung disease)    4. Coronary artery calcification        Plan:     Assessment & Plan    IMPRESSION:  - End-stage pulmonary fibrosis and COPD, with severe shortness of breath.  - Patient needs to meet criteria for lung transplant (e.g., walking 800 feet).  - CAD noted, but lung transplant is the priority given the severity of lung disease.  - Cardiac evaluation planned to assess overall candidacy for lung transplant.  - Explained that heart concerns are secondary to lung issues in terms of immediate treatment priorities.    ATHEROSCLEROTIC HEART DISEASE:  - Ordered echocardiogram.  - Ordered stress test.    PULMONARY FIBROSIS AND COPD:  - " Octavio to attempt walking 800 feet daily to meet lung transplant criteria, continue exercising as much as possible, including pool exercises if accessible.            Echo ordered for SOB and h/o ILD  Advise to f/u pulm service     This note was generated with the assistance of ambient listening technology. Verbal consent was obtained by the patient and accompanying visitor(s) for the recording of patient appointment to facilitate this note. I attest to having reviewed and edited the generated note for accuracy, though some syntax or spelling errors may persist. Please contact the author of this note for any clarification.              [1]   Social History  Tobacco Use    Smoking status: Former     Current packs/day: 1.00     Types: Cigarettes    Smokeless tobacco: Never   Vaping Use    Vaping status: Former   Substance Use Topics    Alcohol use: Not Currently     Comment: occassionally     Drug use: No

## 2025-08-13 ENCOUNTER — HOSPITAL ENCOUNTER (OUTPATIENT)
Dept: CARDIOLOGY | Facility: HOSPITAL | Age: 60
Discharge: HOME | End: 2025-08-13
Attending: INTERNAL MEDICINE
Payer: MEDICARE

## 2025-08-13 VITALS
SYSTOLIC BLOOD PRESSURE: 110 MMHG | WEIGHT: 113 LBS | HEIGHT: 68 IN | BODY MASS INDEX: 17.13 KG/M2 | DIASTOLIC BLOOD PRESSURE: 72 MMHG

## 2025-08-13 DIAGNOSIS — R06.09 DOE (DYSPNEA ON EXERTION): ICD-10-CM

## 2025-08-13 DIAGNOSIS — I25.10 CORONARY ARTERY CALCIFICATION: ICD-10-CM

## 2025-08-13 LAB
AORTIC ROOT ANNULUS: 3.5 CM
AORTIC SIZE INDEX (SOV): 1.8 CM/M2
AORTIC SIZE INDEX: 1.9 CM/M2
ASCENDING AORTA: 3 CM
AV INDEX (PROSTH): 0.8
AV MEAN GRADIENT: 2 MMHG
AV PEAK GRADIENT: 3 MMHG
AV VALVE AREA BY VELOCITY RATIO: 2.8 CM²
AV VALVE AREA: 2.5 CM²
AV VELOCITY RATIO: 0.89
BSA FOR ECHO PROCEDURE: 1.57 M2
CV ECHO LV RWT: 0.48 CM
DOP CALC AO PEAK VEL: 0.9 M/S
DOP CALC AO VTI: 18.4 CM
DOP CALC LVOT AREA: 3.1 CM2
DOP CALC LVOT DIAMETER: 2 CM
DOP CALC LVOT PEAK VEL: 0.8 M/S
DOP CALC RVOT PEAK VEL: 0.68 M/S
DOP CALC RVOT VTI: 10.4 CM
DOP CALCLVOT PEAK VEL VTI: 14.8 CM
E WAVE DECELERATION TIME: 85 MSEC
E/A RATIO: 0.68
E/E' RATIO: 8 M/S
ECHO LV POSTERIOR WALL: 0.8 CM (ref 0.6–1.1)
EJECTION FRACTION: 50 %
FRACTIONAL SHORTENING: 21.2 % (ref 28–44)
INTERVENTRICULAR SEPTUM: 1.4 CM (ref 0.6–1.1)
IVRT: 80 MSEC
LA MAJOR: 3.4 CM
LA MINOR: 3.6 CM
LA WIDTH: 2.5 CM
LEFT ATRIUM AREA SYSTOLIC (APICAL 2 CHAMBER): 11.03 CM2
LEFT ATRIUM AREA SYSTOLIC (APICAL 4 CHAMBER): 10.22 CM2
LEFT ATRIUM SIZE: 2.7 CM
LEFT ATRIUM VOLUME INDEX MOD: 14 ML/M2
LEFT ATRIUM VOLUME INDEX: 13 ML/M2
LEFT ATRIUM VOLUME MOD: 23 ML
LEFT ATRIUM VOLUME: 20 CM3
LEFT INTERNAL DIMENSION IN SYSTOLE: 2.6 CM (ref 2.1–4)
LEFT VENTRICLE DIASTOLIC VOLUME INDEX: 26.88 ML/M2
LEFT VENTRICLE DIASTOLIC VOLUME: 43 ML
LEFT VENTRICLE END SYSTOLIC VOLUME APICAL 2 CHAMBER: 24.49 ML
LEFT VENTRICLE END SYSTOLIC VOLUME APICAL 4 CHAMBER: 20.41 ML
LEFT VENTRICLE MASS INDEX: 68.2 G/M2
LEFT VENTRICLE SYSTOLIC VOLUME INDEX: 15 ML/M2
LEFT VENTRICLE SYSTOLIC VOLUME: 24 ML
LEFT VENTRICULAR INTERNAL DIMENSION IN DIASTOLE: 3.3 CM (ref 3.5–6)
LEFT VENTRICULAR MASS: 109.1 G
LV LATERAL E/E' RATIO: 8.9 M/S
LV SEPTAL E/E' RATIO: 7.8 M/S
LVED V (TEICH): 42.86 ML
LVES V (TEICH): 23.82 ML
LVOT MG: 1.07 MMHG
LVOT MV: 0.48 CM/S
Lab: 1.7 CM/M
Lab: 1.7 CM/M
MV PEAK A VEL: 0.91 M/S
MV PEAK E VEL: 0.62 M/S
MV STENOSIS PRESSURE HALF TIME: 24.65 MS
MV VALVE AREA P 1/2 METHOD: 8.92 CM2
OHS CV CPX PATIENT HEIGHT IN: 68
OHS CV RV/LV RATIO: 0.64 CM
PISA TR MAX VEL: 2.4 M/S
PV MEAN GRADIENT: 1 MMHG
RA MAJOR: 3.14 CM
RA PRESSURE ESTIMATED: 3 MMHG
RA WIDTH: 2.7 CM
RIGHT VENTRICLE DIASTOLIC BASEL DIMENSION: 2.1 CM
RIGHT VENTRICULAR END-DIASTOLIC DIMENSION: 2.14 CM
RV TB RVSP: 5 MMHG
SINUS: 2.9 CM
STJ: 2.6 CM
TDI LATERAL: 0.07 M/S
TDI SEPTAL: 0.08 M/S
TDI: 0.08 M/S
TR MAX PG: 23 MMHG
TRICUSPID ANNULAR PLANE SYSTOLIC EXCURSION: 1.8 CM
TV REST PULMONARY ARTERY PRESSURE: 26 MMHG
Z-SCORE OF LEFT VENTRICULAR DIMENSION IN END DIASTOLE: -3.22
Z-SCORE OF LEFT VENTRICULAR DIMENSION IN END SYSTOLE: -0.65

## 2025-08-13 PROCEDURE — 93306 TTE W/DOPPLER COMPLETE: CPT

## 2025-08-13 PROCEDURE — 93306 TTE W/DOPPLER COMPLETE: CPT | Mod: 26,,, | Performed by: INTERNAL MEDICINE

## 2025-08-15 ENCOUNTER — RESULTS FOLLOW-UP (OUTPATIENT)
Dept: CARDIOLOGY | Facility: HOSPITAL | Age: 60
End: 2025-08-15
Payer: MEDICARE

## 2025-08-17 ENCOUNTER — HOSPITAL ENCOUNTER (EMERGENCY)
Facility: HOSPITAL | Age: 60
Discharge: HOME | End: 2025-08-17
Attending: EMERGENCY MEDICINE
Payer: MEDICARE

## 2025-08-17 VITALS
RESPIRATION RATE: 20 BRPM | BODY MASS INDEX: 17.43 KG/M2 | HEART RATE: 105 BPM | OXYGEN SATURATION: 96 % | TEMPERATURE: 98 F | SYSTOLIC BLOOD PRESSURE: 119 MMHG | WEIGHT: 115 LBS | HEIGHT: 68 IN | DIASTOLIC BLOOD PRESSURE: 76 MMHG

## 2025-08-17 DIAGNOSIS — J44.1 COPD EXACERBATION: ICD-10-CM

## 2025-08-17 DIAGNOSIS — J84.9 ILD (INTERSTITIAL LUNG DISEASE): Primary | ICD-10-CM

## 2025-08-17 DIAGNOSIS — R06.02 SOB (SHORTNESS OF BREATH): ICD-10-CM

## 2025-08-17 LAB
ABSOLUTE EOSINOPHIL (OHS): 0.02 K/UL
ABSOLUTE MONOCYTE (OHS): 0.3 K/UL (ref 0.3–1)
ABSOLUTE NEUTROPHIL COUNT (OHS): 13.55 K/UL (ref 1.8–7.7)
ALBUMIN SERPL BCP-MCNC: 3.5 G/DL (ref 3.5–5.2)
ALP SERPL-CCNC: 64 UNIT/L (ref 40–150)
ALT SERPL W/O P-5'-P-CCNC: 18 UNIT/L (ref 10–44)
ANION GAP (OHS): 11 MMOL/L (ref 8–16)
AST SERPL-CCNC: 19 UNIT/L (ref 11–45)
BASOPHILS # BLD AUTO: 0.07 K/UL
BASOPHILS NFR BLD AUTO: 0.5 %
BILIRUB SERPL-MCNC: 0.4 MG/DL (ref 0.1–1)
BUN SERPL-MCNC: 17 MG/DL (ref 6–20)
CALCIUM SERPL-MCNC: 9 MG/DL (ref 8.7–10.5)
CHLORIDE SERPL-SCNC: 97 MMOL/L (ref 95–110)
CO2 SERPL-SCNC: 26 MMOL/L (ref 23–29)
CREAT SERPL-MCNC: 0.7 MG/DL (ref 0.5–1.4)
CTP QC/QA: YES
CTP QC/QA: YES
D DIMER PPP IA.FEU-MCNC: 0.41 MG/L FEU
ERYTHROCYTE [DISTWIDTH] IN BLOOD BY AUTOMATED COUNT: 15.2 % (ref 11.5–14.5)
GFR SERPLBLD CREATININE-BSD FMLA CKD-EPI: >60 ML/MIN/1.73/M2
GLUCOSE SERPL-MCNC: 112 MG/DL (ref 70–110)
HCT VFR BLD AUTO: 43.9 % (ref 40–54)
HGB BLD-MCNC: 15.2 GM/DL (ref 14–18)
IMM GRANULOCYTES # BLD AUTO: 0.43 K/UL (ref 0–0.04)
IMM GRANULOCYTES NFR BLD AUTO: 2.8 % (ref 0–0.5)
LYMPHOCYTES # BLD AUTO: 0.72 K/UL (ref 1–4.8)
MCH RBC QN AUTO: 32.8 PG (ref 27–31)
MCHC RBC AUTO-ENTMCNC: 34.6 G/DL (ref 32–36)
MCV RBC AUTO: 95 FL (ref 82–98)
NT-PROBNP SERPL-MCNC: 82 PG/ML
NUCLEATED RBC (/100WBC) (OHS): 0 /100 WBC
PLATELET # BLD AUTO: 353 K/UL (ref 150–450)
PMV BLD AUTO: 9 FL (ref 9.2–12.9)
POC MOLECULAR INFLUENZA A AGN: NEGATIVE
POC MOLECULAR INFLUENZA B AGN: NEGATIVE
POTASSIUM SERPL-SCNC: 4.3 MMOL/L (ref 3.5–5.1)
PROT SERPL-MCNC: 7.7 GM/DL (ref 6–8.4)
RBC # BLD AUTO: 4.63 M/UL (ref 4.6–6.2)
RELATIVE EOSINOPHIL (OHS): 0.1 %
RELATIVE LYMPHOCYTE (OHS): 4.8 % (ref 18–48)
RELATIVE MONOCYTE (OHS): 2 % (ref 4–15)
RELATIVE NEUTROPHIL (OHS): 89.8 % (ref 38–73)
SARS-COV-2 RDRP RESP QL NAA+PROBE: NEGATIVE
SODIUM SERPL-SCNC: 134 MMOL/L (ref 136–145)
TROPONIN I SERPL HS-MCNC: <3 NG/L
WBC # BLD AUTO: 15.09 K/UL (ref 3.9–12.7)

## 2025-08-17 PROCEDURE — 96374 THER/PROPH/DIAG INJ IV PUSH: CPT | Mod: ER

## 2025-08-17 PROCEDURE — 87502 INFLUENZA DNA AMP PROBE: CPT | Mod: ER

## 2025-08-17 PROCEDURE — 83880 ASSAY OF NATRIURETIC PEPTIDE: CPT | Mod: ER | Performed by: EMERGENCY MEDICINE

## 2025-08-17 PROCEDURE — 87635 SARS-COV-2 COVID-19 AMP PRB: CPT | Mod: ER | Performed by: EMERGENCY MEDICINE

## 2025-08-17 PROCEDURE — 93010 ELECTROCARDIOGRAM REPORT: CPT | Mod: ,,, | Performed by: INTERNAL MEDICINE

## 2025-08-17 PROCEDURE — 94640 AIRWAY INHALATION TREATMENT: CPT | Mod: ER

## 2025-08-17 PROCEDURE — 94761 N-INVAS EAR/PLS OXIMETRY MLT: CPT | Mod: ER

## 2025-08-17 PROCEDURE — 85025 COMPLETE CBC W/AUTO DIFF WBC: CPT | Mod: ER | Performed by: EMERGENCY MEDICINE

## 2025-08-17 PROCEDURE — 63600175 PHARM REV CODE 636 W HCPCS: Mod: JZ,TB,ER | Performed by: EMERGENCY MEDICINE

## 2025-08-17 PROCEDURE — 25000242 PHARM REV CODE 250 ALT 637 W/ HCPCS: Mod: ER | Performed by: EMERGENCY MEDICINE

## 2025-08-17 PROCEDURE — 85379 FIBRIN DEGRADATION QUANT: CPT | Mod: ER | Performed by: EMERGENCY MEDICINE

## 2025-08-17 PROCEDURE — 82565 ASSAY OF CREATININE: CPT | Mod: ER | Performed by: EMERGENCY MEDICINE

## 2025-08-17 PROCEDURE — 99285 EMERGENCY DEPT VISIT HI MDM: CPT | Mod: 25,ER

## 2025-08-17 PROCEDURE — 93005 ELECTROCARDIOGRAM TRACING: CPT | Mod: ER

## 2025-08-17 PROCEDURE — 84484 ASSAY OF TROPONIN QUANT: CPT | Mod: ER | Performed by: EMERGENCY MEDICINE

## 2025-08-17 RX ORDER — PREDNISONE 10 MG/1
40 TABLET ORAL DAILY
Qty: 20 TABLET | Refills: 0 | Status: SHIPPED | OUTPATIENT
Start: 2025-08-17 | End: 2025-08-22

## 2025-08-17 RX ORDER — IPRATROPIUM BROMIDE AND ALBUTEROL SULFATE 2.5; .5 MG/3ML; MG/3ML
3 SOLUTION RESPIRATORY (INHALATION)
Status: COMPLETED | OUTPATIENT
Start: 2025-08-17 | End: 2025-08-17

## 2025-08-17 RX ORDER — DOXYCYCLINE 100 MG/1
100 CAPSULE ORAL 2 TIMES DAILY
Qty: 14 CAPSULE | Refills: 0 | Status: SHIPPED | OUTPATIENT
Start: 2025-08-17 | End: 2025-08-24

## 2025-08-17 RX ORDER — AZITHROMYCIN 250 MG/1
250 TABLET, FILM COATED ORAL DAILY
Qty: 6 TABLET | Refills: 0 | Status: SHIPPED | OUTPATIENT
Start: 2025-08-17

## 2025-08-17 RX ORDER — METHYLPREDNISOLONE SOD SUCC 125 MG
125 VIAL (EA) INJECTION
Status: COMPLETED | OUTPATIENT
Start: 2025-08-17 | End: 2025-08-17

## 2025-08-17 RX ORDER — LEVOFLOXACIN 500 MG/1
500 TABLET, FILM COATED ORAL DAILY
Qty: 7 TABLET | Refills: 0 | Status: SHIPPED | OUTPATIENT
Start: 2025-08-17

## 2025-08-17 RX ADMIN — METHYLPREDNISOLONE SODIUM SUCCINATE 125 MG: 125 INJECTION, POWDER, FOR SOLUTION INTRAMUSCULAR; INTRAVENOUS at 01:08

## 2025-08-17 RX ADMIN — IPRATROPIUM BROMIDE AND ALBUTEROL SULFATE 3 ML: 2.5; .5 SOLUTION RESPIRATORY (INHALATION) at 01:08

## 2025-08-18 LAB
OHS QRS DURATION: 94 MS
OHS QTC CALCULATION: 466 MS

## 2025-08-21 ENCOUNTER — PATIENT MESSAGE (OUTPATIENT)
Dept: INTERNAL MEDICINE | Facility: CLINIC | Age: 60
End: 2025-08-21
Payer: MEDICARE

## 2025-08-27 ENCOUNTER — TELEPHONE (OUTPATIENT)
Dept: INTERNAL MEDICINE | Facility: CLINIC | Age: 60
End: 2025-08-27
Payer: MEDICARE

## 2025-08-28 ENCOUNTER — HOSPITAL ENCOUNTER (OUTPATIENT)
Dept: RADIOLOGY | Facility: HOSPITAL | Age: 60
Discharge: HOME | End: 2025-08-28
Attending: STUDENT IN AN ORGANIZED HEALTH CARE EDUCATION/TRAINING PROGRAM
Payer: MEDICARE

## 2025-08-28 ENCOUNTER — OFFICE VISIT (OUTPATIENT)
Dept: INTERNAL MEDICINE | Facility: CLINIC | Age: 60
End: 2025-08-28
Payer: MEDICARE

## 2025-08-28 VITALS
BODY MASS INDEX: 17.74 KG/M2 | SYSTOLIC BLOOD PRESSURE: 98 MMHG | HEIGHT: 68 IN | HEART RATE: 63 BPM | WEIGHT: 117.06 LBS | TEMPERATURE: 98 F | OXYGEN SATURATION: 81 % | DIASTOLIC BLOOD PRESSURE: 60 MMHG

## 2025-08-28 DIAGNOSIS — R39.9 LOWER URINARY TRACT SYMPTOMS (LUTS): ICD-10-CM

## 2025-08-28 DIAGNOSIS — Z12.5 SCREENING FOR PROSTATE CANCER: ICD-10-CM

## 2025-08-28 DIAGNOSIS — Z99.81 CHRONIC RESPIRATORY FAILURE WITH HYPOXIA, ON HOME O2 THERAPY: Primary | ICD-10-CM

## 2025-08-28 DIAGNOSIS — J96.11 CHRONIC RESPIRATORY FAILURE WITH HYPOXIA, ON HOME O2 THERAPY: Primary | ICD-10-CM

## 2025-08-28 DIAGNOSIS — Z86.19 HISTORY OF HERPES GENITALIS: ICD-10-CM

## 2025-08-28 DIAGNOSIS — J84.10 PULMONARY FIBROSIS: ICD-10-CM

## 2025-08-28 DIAGNOSIS — F41.9 ANXIETY: ICD-10-CM

## 2025-08-28 PROCEDURE — 3008F BODY MASS INDEX DOCD: CPT | Mod: CPTII,S$GLB,, | Performed by: STUDENT IN AN ORGANIZED HEALTH CARE EDUCATION/TRAINING PROGRAM

## 2025-08-28 PROCEDURE — 3078F DIAST BP <80 MM HG: CPT | Mod: CPTII,S$GLB,, | Performed by: STUDENT IN AN ORGANIZED HEALTH CARE EDUCATION/TRAINING PROGRAM

## 2025-08-28 PROCEDURE — 71046 X-RAY EXAM CHEST 2 VIEWS: CPT | Mod: TC,PO

## 2025-08-28 PROCEDURE — 3074F SYST BP LT 130 MM HG: CPT | Mod: CPTII,S$GLB,, | Performed by: STUDENT IN AN ORGANIZED HEALTH CARE EDUCATION/TRAINING PROGRAM

## 2025-08-28 PROCEDURE — 99214 OFFICE O/P EST MOD 30 MIN: CPT | Mod: S$GLB,,, | Performed by: STUDENT IN AN ORGANIZED HEALTH CARE EDUCATION/TRAINING PROGRAM

## 2025-08-28 PROCEDURE — G2211 COMPLEX E/M VISIT ADD ON: HCPCS | Mod: S$GLB,,, | Performed by: STUDENT IN AN ORGANIZED HEALTH CARE EDUCATION/TRAINING PROGRAM

## 2025-08-28 PROCEDURE — 99999 PR PBB SHADOW E&M-EST. PATIENT-LVL V: CPT | Mod: PBBFAC,,, | Performed by: STUDENT IN AN ORGANIZED HEALTH CARE EDUCATION/TRAINING PROGRAM

## 2025-08-28 PROCEDURE — 1160F RVW MEDS BY RX/DR IN RCRD: CPT | Mod: CPTII,S$GLB,, | Performed by: STUDENT IN AN ORGANIZED HEALTH CARE EDUCATION/TRAINING PROGRAM

## 2025-08-28 PROCEDURE — 1159F MED LIST DOCD IN RCRD: CPT | Mod: CPTII,S$GLB,, | Performed by: STUDENT IN AN ORGANIZED HEALTH CARE EDUCATION/TRAINING PROGRAM

## 2025-08-28 PROCEDURE — 71046 X-RAY EXAM CHEST 2 VIEWS: CPT | Mod: 26,,, | Performed by: RADIOLOGY

## 2025-08-28 RX ORDER — VALACYCLOVIR HYDROCHLORIDE 500 MG/1
500 TABLET, FILM COATED ORAL 2 TIMES DAILY
Qty: 10 TABLET | Refills: 0 | Status: SHIPPED | OUTPATIENT
Start: 2025-08-28 | End: 2025-09-02

## 2025-08-28 RX ORDER — TAMSULOSIN HYDROCHLORIDE 0.4 MG/1
0.4 CAPSULE ORAL DAILY
Qty: 30 CAPSULE | Refills: 2 | Status: SHIPPED | OUTPATIENT
Start: 2025-08-28 | End: 2025-11-26

## 2025-08-29 ENCOUNTER — TELEPHONE (OUTPATIENT)
Dept: INTERNAL MEDICINE | Facility: CLINIC | Age: 60
End: 2025-08-29
Payer: MEDICARE

## 2025-08-30 DIAGNOSIS — J96.11 CHRONIC RESPIRATORY FAILURE WITH HYPOXIA, ON HOME O2 THERAPY: ICD-10-CM

## 2025-08-30 DIAGNOSIS — Z99.81 CHRONIC RESPIRATORY FAILURE WITH HYPOXIA, ON HOME O2 THERAPY: ICD-10-CM

## 2025-09-03 ENCOUNTER — CLINICAL SUPPORT (OUTPATIENT)
Dept: PULMONOLOGY | Facility: CLINIC | Age: 60
End: 2025-09-03
Payer: MEDICARE

## 2025-09-03 ENCOUNTER — OFFICE VISIT (OUTPATIENT)
Dept: PULMONOLOGY | Facility: CLINIC | Age: 60
End: 2025-09-03
Payer: MEDICARE

## 2025-09-03 VITALS
BODY MASS INDEX: 17.91 KG/M2 | OXYGEN SATURATION: 96 % | HEIGHT: 68 IN | RESPIRATION RATE: 19 BRPM | SYSTOLIC BLOOD PRESSURE: 100 MMHG | DIASTOLIC BLOOD PRESSURE: 62 MMHG | WEIGHT: 118.19 LBS | HEART RATE: 117 BPM

## 2025-09-03 DIAGNOSIS — J96.11 CHRONIC RESPIRATORY FAILURE WITH HYPOXIA, ON HOME O2 THERAPY: ICD-10-CM

## 2025-09-03 DIAGNOSIS — Z99.81 CHRONIC RESPIRATORY FAILURE WITH HYPOXIA, ON HOME O2 THERAPY: ICD-10-CM

## 2025-09-03 DIAGNOSIS — Z87.891 FORMER HEAVY TOBACCO SMOKER: ICD-10-CM

## 2025-09-03 DIAGNOSIS — Z99.81 ON HOME O2: ICD-10-CM

## 2025-09-03 DIAGNOSIS — J44.89 CHRONIC BRONCHITIS WITH EMPHYSEMA: ICD-10-CM

## 2025-09-03 DIAGNOSIS — J44.9 COPD, SEVERE: Primary | ICD-10-CM

## 2025-09-03 DIAGNOSIS — J84.10 PULMONARY FIBROSIS: ICD-10-CM

## 2025-09-03 PROCEDURE — 99999 PR PBB SHADOW E&M-EST. PATIENT-LVL IV: CPT | Mod: PBBFAC,,, | Performed by: INTERNAL MEDICINE

## 2025-09-03 PROCEDURE — 99999 PR PBB SHADOW E&M-EST. PATIENT-LVL II: CPT | Mod: PBBFAC,,,

## 2025-09-03 RX ORDER — ALBUTEROL SULFATE 0.83 MG/ML
2.5 SOLUTION RESPIRATORY (INHALATION) EVERY 6 HOURS PRN
Qty: 360 ML | Refills: 5 | Status: SHIPPED | OUTPATIENT
Start: 2025-09-03 | End: 2026-09-03

## 2025-09-03 RX ORDER — BUDESONIDE, GLYCOPYRROLATE, AND FORMOTEROL FUMARATE 160; 9; 4.8 UG/1; UG/1; UG/1
2 AEROSOL, METERED RESPIRATORY (INHALATION) 2 TIMES DAILY
Qty: 10.7 G | Refills: 11 | Status: SHIPPED | OUTPATIENT
Start: 2025-09-03

## 2025-09-03 RX ORDER — DEXAMETHASONE 4 MG/1
4 TABLET ORAL DAILY
Qty: 30 TABLET | Refills: 0 | Status: SHIPPED | OUTPATIENT
Start: 2025-09-03

## 2025-09-03 RX ORDER — OXYCODONE HYDROCHLORIDE 15 MG/1
TABLET ORAL
COMMUNITY
Start: 2025-08-15

## 2025-09-03 RX ORDER — SODIUM CHLORIDE FOR INHALATION 7 %
4 VIAL, NEBULIZER (ML) INHALATION 2 TIMES DAILY
Qty: 240 ML | Refills: 2 | Status: SHIPPED | OUTPATIENT
Start: 2025-09-03

## 2025-09-04 ENCOUNTER — DOCUMENTATION ONLY (OUTPATIENT)
Dept: PULMONOLOGY | Facility: CLINIC | Age: 60
End: 2025-09-04
Payer: MEDICARE

## (undated) DEVICE — NDL SPINAL 18GX3.5 SPINOCAN

## (undated) DEVICE — SUT PERMA HAND SILK BLK 0-0

## (undated) DEVICE — CONNECTOR Y 3/8X3/8X1/2 STRL

## (undated) DEVICE — ADHESIVE DERMABOND ADVANCED

## (undated) DEVICE — GLOVE SURGICAL LATEX SZ 6.5

## (undated) DEVICE — GOWN POLY REINF BRTH SLV XL

## (undated) DEVICE — Device

## (undated) DEVICE — GRASPER TIP RENEW LAP FENEST

## (undated) DEVICE — CONNECTOR TUBING STR 5 IN 1

## (undated) DEVICE — COVER LIGHT HANDLE 80/CA

## (undated) DEVICE — ELECTRODE REM PLYHSV RETURN 9

## (undated) DEVICE — CONTAINER SPECIMEN OR STER 4OZ

## (undated) DEVICE — DRESSING MEPORE ISLAND 31/2X4

## (undated) DEVICE — SOL NORMAL USPCA 0.9%

## (undated) DEVICE — BLADE EZ CLEAN 2 1/2

## (undated) DEVICE — SYR 30CC LUER LOCK

## (undated) DEVICE — TOWEL OR DISP STRL BLUE 4/PK

## (undated) DEVICE — DRAIN CHEST DRY SUCTION

## (undated) DEVICE — CANNULA ENDOPATH XCEL 5X100MM

## (undated) DEVICE — TUBE LUKI ASP COLL 6 1/4

## (undated) DEVICE — PACK BASIC SETUP SC BR

## (undated) DEVICE — TRAY CATH 1-LYR URIMTR 16FR

## (undated) DEVICE — SET PNEUMOCLEAR HEAT HUM SE HF

## (undated) DEVICE — TAPE SILK 3IN

## (undated) DEVICE — DRAPE INCISE IOBAN 2 23X17IN

## (undated) DEVICE — CLEANER CAUT TIP STRL 2X2IN

## (undated) DEVICE — DRAPE LAPSCP CHOLE 122X102X78

## (undated) DEVICE — CATH THORACIC 28FR ST

## (undated) DEVICE — KIT ANTIFOG W/SPONG & FLUID

## (undated) DEVICE — MANIFOLD 4 PORT

## (undated) DEVICE — APPLICATOR CHLORAPREP ORN 26ML

## (undated) DEVICE — KIT INTRODUCER STIFFEN MICRO

## (undated) DEVICE — TROCAR ENDOPATH XCEL 5X100MM

## (undated) DEVICE — DRESSING ANTIMICROBIAL 1 INCH

## (undated) DEVICE — TUBING MEDI-VAC 20FT .25IN

## (undated) DEVICE — DRAPE INCISE IOBAN 2 23X33IN

## (undated) DEVICE — PAD CURAD NONADH 3X4IN

## (undated) DEVICE — SUT MCRYL PLUS 4-0 PS2 27IN

## (undated) DEVICE — NDL SAFETY 22G X 1.5 ECLIPSE